# Patient Record
Sex: MALE | Race: WHITE | Employment: OTHER | URBAN - METROPOLITAN AREA
[De-identification: names, ages, dates, MRNs, and addresses within clinical notes are randomized per-mention and may not be internally consistent; named-entity substitution may affect disease eponyms.]

---

## 2017-01-19 ENCOUNTER — OFFICE VISIT (OUTPATIENT)
Dept: FAMILY MEDICINE | Facility: CLINIC | Age: 77
End: 2017-01-19

## 2017-01-19 VITALS
BODY MASS INDEX: 31.79 KG/M2 | SYSTOLIC BLOOD PRESSURE: 165 MMHG | TEMPERATURE: 98.2 F | WEIGHT: 224.8 LBS | DIASTOLIC BLOOD PRESSURE: 96 MMHG | HEART RATE: 68 BPM

## 2017-01-19 DIAGNOSIS — E11.65 TYPE 2 DIABETES MELLITUS WITH HYPERGLYCEMIA, WITH LONG-TERM CURRENT USE OF INSULIN (H): ICD-10-CM

## 2017-01-19 DIAGNOSIS — G25.0 BENIGN ESSENTIAL TREMOR: ICD-10-CM

## 2017-01-19 DIAGNOSIS — N18.30 CHRONIC KIDNEY DISEASE, STAGE III (MODERATE) (H): ICD-10-CM

## 2017-01-19 DIAGNOSIS — Z23 NEED FOR VACCINATION: ICD-10-CM

## 2017-01-19 DIAGNOSIS — R80.9 PROTEINURIA: Primary | ICD-10-CM

## 2017-01-19 DIAGNOSIS — Z79.4 TYPE 2 DIABETES MELLITUS WITH HYPERGLYCEMIA, WITH LONG-TERM CURRENT USE OF INSULIN (H): ICD-10-CM

## 2017-01-19 DIAGNOSIS — E11.22 TYPE 2 DIABETES MELLITUS WITH DIABETIC CHRONIC KIDNEY DISEASE (H): Primary | ICD-10-CM

## 2017-01-19 LAB
BASOPHILS # BLD AUTO: 0 THOU/UL (ref 0–0.2)
BASOPHILS NFR BLD AUTO: 0 % (ref 0–2)
BUN SERPL-MCNC: 31.3 MG/DL (ref 7–21)
CALCIUM SERPL-MCNC: 9.6 MG/DL (ref 8.5–10.1)
CHLORIDE SERPLBLD-SCNC: 106.5 MMOL/L (ref 98–110)
CO2 SERPL-SCNC: 27.1 MMOL/L (ref 20–32)
CREAT SERPL-MCNC: 1.5 MG/DL (ref 0.7–1.3)
CREAT UR-MCNC: 101.9 MG/DL
EOSINOPHIL # BLD AUTO: 0.1 THOU/UL (ref 0–0.4)
EOSINOPHIL NFR BLD AUTO: 2 % (ref 0–6)
ERYTHROCYTE [DISTWIDTH] IN BLOOD BY AUTOMATED COUNT: 12.8 % (ref 11–14.5)
GFR SERPL CREATININE-BSD FRML MDRD: 48.6 ML/MIN/1.7 M2
GLUCOSE SERPL-MCNC: 102 MG'DL (ref 70–99)
HBA1C MFR BLD: 9.9 % (ref 4.1–5.7)
HCT VFR BLD AUTO: 40.6 % (ref 40–54)
HGB BLD-MCNC: 13.3 G/DL (ref 14–18)
LYMPHOCYTES # BLD AUTO: 1.5 THOU/UL (ref 0.8–4.4)
LYMPHOCYTES NFR BLD AUTO: 23 % (ref 20–40)
MCH RBC QN AUTO: 30.6 PG (ref 27–34)
MCHC RBC AUTO-ENTMCNC: 32.8 G/DL (ref 32–36)
MCV RBC AUTO: 93 FL (ref 80–100)
MICROALBUMIN UR-MCNC: 69.37 MG/DL (ref 0–1.99)
MICROALBUMIN/CREAT UR: 680.8 MG/G
MONOCYTES # BLD AUTO: 0.5 THOU/UL (ref 0–0.9)
MONOCYTES NFR BLD AUTO: 7 % (ref 2–10)
NEUTROPHILS # BLD AUTO: 4.5 THOU/UL (ref 2–7.7)
NEUTROPHILS NFR BLD AUTO: 68 % (ref 50–70)
PARATHYROID HORMONE: 43 PG/ML (ref 10–86)
PLATELET # BLD AUTO: 298 THOU/UL (ref 140–440)
PMV BLD AUTO: 10.1 FL (ref 8.5–12.5)
POTASSIUM SERPL-SCNC: 4.6 MMOL/DL (ref 3.2–4.6)
RBC # BLD AUTO: 4.35 MILL/UL (ref 4.4–6.2)
SODIUM SERPL-SCNC: 143 MMOL/L (ref 132–142)
WBC # BLD AUTO: 6.7 THOU/UL (ref 4–11)

## 2017-01-19 RX ORDER — PROPRANOLOL HYDROCHLORIDE 160 MG/1
160 CAPSULE, EXTENDED RELEASE ORAL DAILY
Qty: 90 EACH | Refills: 3 | Status: SHIPPED | OUTPATIENT
Start: 2017-01-19 | End: 2018-04-16

## 2017-01-19 RX ORDER — PROPRANOLOL HYDROCHLORIDE 80 MG/1
80 CAPSULE, EXTENDED RELEASE ORAL AT BEDTIME
Qty: 93 CAPSULE | Refills: 3 | Status: SHIPPED | OUTPATIENT
Start: 2017-01-19 | End: 2018-04-16

## 2017-01-19 RX ORDER — ATORVASTATIN CALCIUM 40 MG/1
40 TABLET, FILM COATED ORAL DAILY
Qty: 90 TABLET | Refills: 3 | Status: SHIPPED | OUTPATIENT
Start: 2017-01-19 | End: 2018-04-16

## 2017-01-19 RX ORDER — LISINOPRIL 40 MG/1
40 TABLET ORAL
Qty: 180 TABLET | Refills: 3 | Status: SHIPPED | OUTPATIENT
Start: 2017-01-19 | End: 2018-04-16

## 2017-01-19 NOTE — MR AVS SNAPSHOT
After Visit Summary   1/19/2017    Senthil Castillo    MRN: [id number]           Patient Information     Date Of Birth          1940        Visit Information        Provider Department      1/19/2017 9:00 AM Mo Lovett MD [facility]        Today's Diagnoses     Proteinuria    -  1     Chronic kidney disease, stage III (moderate)         Need for vaccination         Benign essential tremor         Type 2 diabetes mellitus with hyperglycemia, with long-term current use of insulin (H)           Care Instructions    Thank you for coming to [facility].  **If you had lab testing today and your results are reassuring or normal they will be be mailed to you within 7 days.   **If the lab tests need quick action we will call you with the results.  The phone number we will call with results is # [phone] (home) . If this is not the best number please call our clinic and change the number.  If you need any refills please call your pharmacy and they will contact us.  If you have any concerns about today's visit or wish to schedule another appointment please call our office during normal business hours [phone] (8-5:00 M-F)  If you have urgent medical concerns please call [phone] at any time of the day.  If you a medical emergency please call [phone]  Again thank you for choosing [facility] and please let us know how we can best partner with you to improve you and your family's health.    We discussed your Primdone and decided to leave it in your hands and have you think about going back to your neurologist to discuss other medications.  If and when you decide to do so you can just call the clinic and get a message to Dr. Lovett to put in the referral.   We also refilled all of your medications, so you can  your prescriptions at your pharmacy at your earliest convenience.  You can come back to discuss the back of your legs for another visit.          Follow-ups after your visit         Who to contact     Please call your clinic at [phone] to:    Ask questions about your health    Make or cancel appointments    Discuss your medicines    Learn about your test results    Speak to your doctor   If you have compliments or concerns about an experience at your clinic, or if you wish to file a complaint, please contact [facility] Patient Relations at [phone] or email us at [email or web address]         Additional Information About Your Visit        Benefit Mobile[employer] Information     eLama is an electronic gateway that provides easy, online access to your medical records. With eLama, you can request a clinic appointment, read your test results, renew a prescription or communicate with your care team.     To sign up for eLama visit the website at [email or web address]   You will be asked to enter the access code listed below, as well as some personal information. Please follow the directions to create your username and password.     Your access code is: [email or web address]  Expires: 2017  9:50 AM     Your access code will  in 90 days. If you need help or a new code, please contact your [facility] or call [phone] for assistance.        Care EveryWhere ID     This is your Care EveryWhere ID. This could be used by other organizations to access your [place] medical records  [id number]        Your Vitals Were     Pulse Temperature                68 98.2  F (36.8  C) (Oral)           Blood Pressure from Last 3 Encounters:   17 165/96   16 174/82   02/12/15 156/92    Weight from Last 3 Encounters:   17 224 lb 12.8 oz (101.969 kg)   16 228 lb 9.6 oz (103.692 kg)   02/12/15 228 lb 11.2 oz (103.738 kg)              We Performed the Following     ADMIN VACCINE, EACH ADDITIONAL     ADMIN VACCINE, INITIAL     Basic Metabolic Panel ([place])     CBC w/ Diff and Plt ([facility])     Flu vaccine, quad, with  preservative, 0.5 ml     Hemoglobin A1c ([facility])     Microalbumin Creatinine Ratio Random Ur ([facility])     Parathyroid Horm.Intact ([facility])     PNEUMOCOCCAL CONJ VACCINE 13 VALENT IM (PCV13)        Primary Care Provider Office Phone # Fax #    Mo Lovett [phone]       [address] [address]        Thank you!     Thank you for choosing [facility]  for your care. Our goal is always to provide you with excellent care. Hearing back from our patients is one way we can continue to improve our services. Please take a few minutes to complete the written survey that you may receive in the mail after your visit with us. Thank you!             Your Updated Medication List - Protect others around you: Learn how to safely use, store and throw away your medicines at www.disposemymeds.org.          This list is accurate as of: 1/19/17  9:50 AM.  Always use your most recent med list.                   Brand Name Dispense Instructions for use    aspirin 325 MG tablet      Take  by mouth daily.       atorvastatin 40 MG tablet    LIPITOR    90 tablet    Take 1 tablet (40 mg) by mouth daily       blood glucose monitoring test strip    ONE TOUCH ULTRA    1 Box    Use up to 3 times daily       insulin pen needle 29G X 12.7MM    BD ULTRA-FINE    180 each    Use twice daily       lisinopril 40 MG tablet    PRINIVIL/ZESTRIL    180 tablet    Take 1 tablet (40 mg) by mouth 2 times daily       metFORMIN 1000 MG tablet    GLUCOPHAGE    180 tablet    Take 1 tablet (1,000 mg) by mouth 2 times daily (with meals)       NovoLIN MIX 70/30 VIAL injection   Generic drug:  insulin NPH-insulin regular      Take 20 u morning and 30 u evening       primidone 50 MG tablet    MYSOLINE    62 tablet    Take 1/2 tablet (25 mg) at bedtime for 1 week, 1 tablet at bedtime for 1 week, 1 and 1/2 tablet at bedtime for 1 week, then 2 tablets at bedtime       * propranolol 160 MG 24 hr capsule     INDERAL LA    90 each    Take 1 capsule (160 mg) by mouth daily       * propranolol 80 MG 24 hr capsule    INDERAL LA    90 capsule    Take 1 capsule (80 mg) by mouth At Bedtime       * Notice:  This list has 2 medication(s) that are the same as other medications prescribed for you. Read the directions carefully, and ask your doctor or other care provider to review them with you.

## 2017-01-19 NOTE — PROGRESS NOTES
"Subjective:  Senthil Castillo is a 76 year old male with a past medical history of colonic polyps, CKD Stage III, coronary atherosclerosis, HTN, essential tremor, DMII presenting today for a diabetic check-up. His most recent visit was 4/2016 and he knows he should have come in before now.  At that time he endorsed experiencing hypoglycemic episodes approx. 1-2 times a year while working hard. Today he is taking his medications as prescribed and has not experienced any hypoglycemic episodes recently.       Concerns today include:     1. Hypertension: In office measures 165/96. He states at home and in store automated readings of 130s systolic and reports that he has always had \"white coat high blood pressure.\"     2. Diabetes Mellitus Type II: He states that he tries to keep his BG in the 150s although it can be difficult. A1C today is 9.9 and is typically around 9. Denies any recent hypoglycemic events. Current insulin regiment is purchased OTC and he is unwilling to change due to cost and difficulty making change. Denies any decreased sensation or visual symptoms and will be following up with optho soon for his annual visit.     3.Tremor: worse at prior visit and recommended adding primidone with his propanolol. Today he reports that he had noted nightmares with primidone at night and felt \"funny\" if he takes it in the morning. He discontinued the primidone 5 days ago 2/2 these side effects. He is unable to write his name due to the severity of the tremor. Today he wonders about trying a new med. He doesn't want to schedule an appointment with neurology at this point.     4. Personal Stress: Has previously endorsed additional stress attributed to wife's changing memory and personality. She is doing better and is now following with a PCP. He is currently doing well with his mood and stress.    FamHx: Maternal Breast Ca, intestinal Ca. No diabetes. Possible HTN. Dad had skin cancer.  Alcohol: A beer every once in a " while  Tobacco: None. Chewed cigars in the past.  Recreational Drugs: None.   Hobbies/Goals: Rebuilds cars.   Occupation: Retired . previously endorsed additional stress attributed to wife's changing memory and personality. She is doing better and is now following with a PCP. He is currently doing well with his mood and stress.    Review of Systems:  He denies chest pain, shortness of breath, vision changes, stomach ache, nausea, vomiting, dizziness, diarrhea/constipation, muscle/joint pain, fever, night sweats, weight loss, skin changes, mood changes, denies tingling/numbness in fingers/toes.    Objective:  /96 mmHg  Pulse 68  Temp(Src) 98.2  F (36.8  C) (Oral)  Wt 224 lb 12.8 oz (101.969 kg)    Physical Exam   Constitutional: He is oriented to person, place, and time and well-developed, well-nourished, and in no distress. No distress.   HENT:   Head: Atraumatic.   Eyes: EOM are normal. Pupils are equal, round, and reactive to light. No scleral icterus.   Neck: No JVD present. No tracheal deviation present.   Cardiovascular: Normal rate, regular rhythm, normal heart sounds and intact distal pulses.  Exam reveals no gallop and no friction rub.    No murmur heard.  Pulmonary/Chest: Effort normal. No respiratory distress. He has no wheezes. He has no rales.   Abdominal: Soft. He exhibits no distension.   Musculoskeletal: He exhibits no edema or tenderness.   Neurological: He is alert and oriented to person, place, and time.   Pedal sensation intact to monofilament examination.    Skin: Skin is warm and dry. No rash noted. He is not diaphoretic. No erythema.   Some dry skin on foot examination.    Psychiatric: Memory, affect and judgment normal.       Assessment/Plan:  Senthil Castillo is a 75 year old male presenting today for Diabetes Mellitus check-up.     1. Type II diabetes mellitus: A1c 9.9. Discussed medication changes. Pt. Is currently unwilling to change both due to cost but also because he does  not want to change. No neurological deficits noted on exam. Discussed dangers of uncontrolled diabetes and need to reassess personal management of DM. Encouraged to return more frequently to the office.   - Hemoglobin A1c ([facility])  - Microalbumin Creatinine Ratio Random Ur ([facility])  - atorvastatin continued  - metFORMIN continued  - insulin pen needle continude    2. Essential hypertension secondary to CKD: Elevated BP today but  Pt. States home/store readings have been in the systolic 130s.  We therefore did not change BP meds today but ask him to notify us if BP remains >140/90  - Basic Metabolic Panel ([facility]) - Results < 1 hr    3. Proteinuria  - Basic Metabolic Panel ([facility]) - Results < 1 hr  - lisinopril continued    4. Coronary atherosclerosis of unspecified type of vessel, native or graft: No symptoms at this time. Will continue to monitor for them.   - continue Statin, aspirin    5. Chronic kidney disease, stage III (moderate): creatinine 1.5, BUN 31.3, GFR 48.6. Will continue to monitor. GFR estimate is 48.6 today, if GFR drops below 45 the benefits of continuing metformin should be discussed with patient. Metformin should be discontinued if GFR drops below 30 due to risk of lactic acidosis.  He is not attending nephrology regularly.  Agreed that if renal function declines or if other labs return significantly abnormal will refer back to nephrology.    - Microalbumin Creatinine Ratio Random Ur ([facility])  - Parathyroid Horm.Intact ([facility])  - CBC with Plt ([facility])  - lisinopril continued    6. Essential and other specified forms of tremor: Discontinued primidone 5 days ago 2/2 nightmares. Because of tremor he is unable to write name. Will continue to follow. Does not want neurology appointment at this time even though symptoms appear to be worsened.    - propranolol continued    7. Maintenance: PCV 13 and flu shot administered    Total visit time was 40 mins, all of this was face to face MD time  and over 50% spent in counselling and coordination of care.     This note is scribed by Anton Velazquez, MS4 scribed for Mo Lovett MD MPH    The medical student acted as a scribe and the encounter documented above was performed completely by me.    Mo Lovett MD MPH

## 2017-01-19 NOTE — PATIENT INSTRUCTIONS
Thank you for coming to [facility].  **If you had lab testing today and your results are reassuring or normal they will be be mailed to you within 7 days.   **If the lab tests need quick action we will call you with the results.  The phone number we will call with results is # [phone] (home) . If this is not the best number please call our clinic and change the number.  If you need any refills please call your pharmacy and they will contact us.  If you have any concerns about today's visit or wish to schedule another appointment please call our office during normal business hours [phone] (8-5:00 M-F)  If you have urgent medical concerns please call [phone] at any time of the day.  If you a medical emergency please call [phone]  Again thank you for choosing [facility] and please let us know how we can best partner with you to improve you and your family's health.    We discussed your Primdone and decided to leave it in your hands and have you think about going back to your neurologist to discuss other medications.  If and when you decide to do so you can just call the clinic and get a message to Dr. Lovett to put in the referral.   We also refilled all of your medications, so you can  your prescriptions at your pharmacy at your earliest convenience.  You can come back to discuss the back of your legs for another visit.

## 2017-01-19 NOTE — Clinical Note
January 24, 2017      Senthil Castillo  [address]  [place] [address]        Dear Senthil,    Please see below for your test results.    Resulted Orders   Hemoglobin A1c ([facility])   Result Value Ref Range    Hemoglobin A1C 9.9 (H) 4.1 - 5.7 %   Microalbumin Creatinine Ratio Random Ur ([facility])   Result Value Ref Range    Microalbumin, Urine 69.37 (H) 0.00 - 1.99 mg/dL    Creatinine, Urine 101.9 mg/dL    Albumin Urine mg/g Cr 680.8 (H) <=19.9 mg/g    Narrative    Test performed by:  [facility]  [address] [place] [address]  Microalbumin, Random Urine  <2.0 mg/dL . . . . . . . . Normal  3.0-30.0 mg/dL . . . . . . Microalbuminuria  >30.0 mg/dL . . . . . .  . Clinical Proteinuria  Microalbumin/Creatinine Ratio, Random Urine  <20 mg/g . . . . .. . . . Normal   mg/g . . . . . . . Microalbuminuria  >300 mg/g . . . . . . . . Clinical Proteinuria   CBC w/ Diff and Plt ([facility])   Result Value Ref Range    WBC 6.7 4.0 - 11.0 thou/uL    RBC 4.35 (L) 4.40 - 6.20 mill/uL    Hemoglobin 13.3 (L) 14.0 - 18.0 g/dL    Hematocrit 40.6 40.0 - 54.0 %    MCV 93 80 - 100 fL    MCH 30.6 27.0 - 34.0 pg    MCHC 32.8 32.0 - 36.0 g/dL    RDW 12.8 11.0 - 14.5 %    Platelets 298 140 - 440 thou/uL    Mean Platelet Volume 10.1 8.5 - 12.5 fL    % Neutrophils 68 50 - 70 %    % Lymphocytes 23 20 - 40 %    % Monocytes 7 2 - 10 %    % Eosinophils 2 0 - 6 %    % Basophils 0 0 - 2 %    Neutrophils (Absolute) 4.5 2.0 - 7.7 thou/uL    Lymphs (Absolute) 1.5 0.8 - 4.4 thou/uL    Monocytes(Absolute) 0.5 0.0 - 0.9 thou/uL    Eos (Absolute) 0.1 0.0 - 0.4 thou/uL    Baso (Absolute) 0.0 0.0 - 0.2 thou/uL    Narrative    Test performed by:  [facility] [address] [place] [address]   Basic Metabolic Panel ([place])   Result Value Ref Range    Urea Nitrogen 31.3 (H) 7.0 - 21.0 mg/dL    Calcium 9.6 8.5 - 10.1 mg/dL    Chloride 106.5 98.0 - 110.0 mmol/L    Carbon Dioxide 27.1 20.0 - 32.0 mmol/L    Creatinine  1.5 (H) 0.7 - 1.3 mg/dL    Glucose 102.0 (H) 70.0 - 99.0 mg'dL    Potassium 4.6 3.2 - 4.6 mmol/dL    Sodium 143.0 (H) 132.0 - 142.0 mmol/L    GFR Estimate 48.6 mL/min/1.7 m2    GFR Estimate If Black 58.8 mL/min/1.7 m2   Parathyroid Horm.Intact ([facility])   Result Value Ref Range    Parathyroid Hormone 43 10 - 86 pg/mL    Narrative    Test performed by:  [facility]  [address] [place] [address]     Jose Guadalupe Ndiaye - nice to see you.  As we discussed, the average sugar could be lower.  But, otherwise, your kidney function remains in stable range.  Your hemoglobin (remember the assistant got this confused with the hemoglobin A1C) is actually just below normal and your parathyroid hormone is normal so you don't have to see the kidney doctor nor need iron and EPO at this time.  You are still losing protein in your urine - and this is related to your blood sugars.  OK, take care Senthil - and let's plan to see you in 3-6 months - Mo Lovett

## 2017-04-24 ENCOUNTER — OFFICE VISIT (OUTPATIENT)
Dept: FAMILY MEDICINE | Facility: CLINIC | Age: 77
End: 2017-04-24

## 2017-04-24 VITALS
WEIGHT: 224.6 LBS | DIASTOLIC BLOOD PRESSURE: 89 MMHG | OXYGEN SATURATION: 95 % | TEMPERATURE: 98 F | BODY MASS INDEX: 31.77 KG/M2 | HEART RATE: 71 BPM | SYSTOLIC BLOOD PRESSURE: 166 MMHG

## 2017-04-24 DIAGNOSIS — Z79.4 TYPE 2 DIABETES MELLITUS WITH DIABETIC NEPHROPATHY, WITH LONG-TERM CURRENT USE OF INSULIN (H): Primary | ICD-10-CM

## 2017-04-24 DIAGNOSIS — E11.21 TYPE 2 DIABETES MELLITUS WITH DIABETIC NEPHROPATHY, WITH LONG-TERM CURRENT USE OF INSULIN (H): Primary | ICD-10-CM

## 2017-04-24 DIAGNOSIS — N18.30 CHRONIC KIDNEY DISEASE, STAGE III (MODERATE) (H): ICD-10-CM

## 2017-04-24 DIAGNOSIS — R80.9 PROTEINURIA: ICD-10-CM

## 2017-04-24 LAB
% GRANULOCYTES: 74.2 %G (ref 40–75)
BUN SERPL-MCNC: 31.8 MG/DL (ref 7–21)
CALCIUM SERPL-MCNC: 9.7 MG/DL (ref 8.5–10.1)
CHLORIDE SERPLBLD-SCNC: 103.1 MMOL/L (ref 98–110)
CO2 SERPL-SCNC: 28.4 MMOL/L (ref 20–32)
CREAT SERPL-MCNC: 1.3 MG/DL (ref 0.7–1.3)
CREAT UR-MCNC: 131.9 MG/DL
GFR SERPL CREATININE-BSD FRML MDRD: 57.4 ML/MIN/1.7 M2
GLUCOSE SERPL-MCNC: 151.2 MG'DL (ref 70–99)
GRANULOCYTES #: 4.9 K/UL (ref 1.6–8.3)
HBA1C MFR BLD: 8.1 % (ref 4.1–5.7)
HCT VFR BLD AUTO: 40.5 % (ref 40–53)
HEMOGLOBIN: 12 G/DL (ref 13.3–17.7)
LYMPHOCYTES # BLD AUTO: 1.3 K/UL (ref 0.8–5.3)
LYMPHOCYTES NFR BLD AUTO: 19.8 %L (ref 20–48)
MCH RBC QN AUTO: 28.1 PG (ref 26.5–35)
MCHC RBC AUTO-ENTMCNC: 29.6 G/DL (ref 32–36)
MCV RBC AUTO: 94.9 FL (ref 78–100)
MICROALBUMIN UR-MCNC: 148.1 MG/DL (ref 0–1.99)
MICROALBUMIN/CREAT UR: 1122.8 MG/G
MID #: 0.4 K/UL (ref 0–2.2)
MID %: 6 %M (ref 0–20)
PLATELET # BLD AUTO: 288 K/UL (ref 150–450)
POTASSIUM SERPL-SCNC: 4.7 MMOL/DL (ref 3.2–4.6)
RBC # BLD AUTO: 4.3 M/UL (ref 4.4–5.9)
SODIUM SERPL-SCNC: 137.7 MMOL/L (ref 132–142)
T4 FREE SERPL-MCNC: 0.9 NG/DL (ref 0.7–1.8)
TSH SERPL DL<=0.05 MIU/L-ACNC: 6.46 UIU/ML (ref 0.3–5)
WBC # BLD AUTO: 6.6 K/UL (ref 4–11)

## 2017-04-24 NOTE — LETTER
April 25, 2017      Senthil Castillo  [address]  [place] [address]        Dear Senthil,    Please see below for your test results.    Resulted Orders   Basic Metabolic Panel ([place])   Result Value Ref Range    Urea Nitrogen 31.8 (H) 7.0 - 21.0 mg/dL    Calcium 9.7 8.5 - 10.1 mg/dL    Chloride 103.1 98.0 - 110.0 mmol/L    Carbon Dioxide 28.4 20.0 - 32.0 mmol/L    Creatinine 1.3 0.7 - 1.3 mg/dL    Glucose 151.2 (H) 70.0 - 99.0 mg'dL    Potassium 4.7 (H) 3.2 - 4.6 mmol/dL    Sodium 137.7 132.0 - 142.0 mmol/L    GFR Estimate 57.4 (L) >60.0 mL/min/1.7 m2    GFR Estimate If Black 69.4 >60.0 mL/min/1.7 m2   Hemoglobin A1c ([facility])   Result Value Ref Range    Hemoglobin A1C 8.1 (H) 4.1 - 5.7 %   Microalbumin Creatinine Ratio Random Ur ([facility])   Result Value Ref Range    Microalbumin, Urine 148.10 (H) 0.00 - 1.99 mg/dL    Creatinine, Urine 131.9 mg/dL    Albumin Urine mg/g Cr 1122.8 (H) <=19.9 mg/g    Narrative    Test performed by:  [facility]  [address] [place] [address]  Microalbumin, Random Urine  <2.0 mg/dL . . . . . . . . Normal  3.0-30.0 mg/dL . . . . . . Microalbuminuria  >30.0 mg/dL . . . . . .  . Clinical Proteinuria  Microalbumin/Creatinine Ratio, Random Urine  <20 mg/g . . . . .. . . . Normal   mg/g . . . . . . . Microalbuminuria  >300 mg/g . . . . . . . . Clinical Proteinuria   Thyroid [place] ([facility])   Result Value Ref Range    TSH 6.46 (H) 0.30 - 5.00 uIU/mL    Narrative    Test performed by:  [facility]  [address] [place] [address]   CBC with Diff Plt ([facility])   Result Value Ref Range    WBC 6.6 4.0 - 11.0 K/uL    Lymphocytes # 1.3 0.8 - 5.3 K/uL    % Lymphocytes 19.8 (L) 20.0 - 48.0 %L    Mid # 0.4 0.0 - 2.2 K/uL    Mid % 6.0 0.0 - 20.0 %M    GRANULOCYTES # 4.9 1.6 - 8.3 K/uL    % Granulocytes 74.2 40.0 - 75.0 %G    RBC 4.3 (L) 4.4 - 5.9 M/uL    Hemoglobin 12.0 (L) 13.3 - 17.7 g/dL    Hematocrit 40.5 40.0 - 53.0 %    MCV 94.9 78.0 - 100.0  fL    MCH 28.1 26.5 - 35.0    MCHC 29.6 (L) 32.0 - 36.0 g/dL    Platelets 288.0 150.0 - 450.0 K/uL   T4  Free ([facility])   Result Value Ref Range    T4 Free 0.9 0.7 - 1.8 ng/dL    Narrative    Test performed by:  [facility]  [address] [place] [address]       James Shin - a few things with your labs:    1. Firstly, your A1C (average sugar) is MUCH better - congratulations!  It is the lowest that it has been in 4 years - 8.1.  Our goal is 8.0 so you're right at goal.  This is telling us that on average you are sitting right around 150-160 between the highs and lows.  Keep up the good work!  As you know I do not think you are on the best insulin regimen for you but you obviously have made this work - and made major dietary changes.  2. Your thyroid level is borderline.  This is a new finding and could explain tiredness.  I suggest we recheck this in 3 months.  It's now abnormal enough to take a medication for it yet.    3. Your kidney function has improved as shown by a reduction in your blood creatinine (waste product) from 1.5 three months ago to 1.3.  This is good!  Your hemoglobin is steady - so you do not need to see the kidney doctor for any iron or EPO.  However, somewhat surprisingly you are losing more protein in your urine than you did previously - usually this tracks your diabetes control.  Again, I would suggest we recheck all these labs in 3 months - OK?      4. Your potassium was just mildly elevated - don't take any extra potassium and back off on bananas for now - OK?  Again, we'll recheck this - it can be a side effect of Lisinopril - but don't change any of your medications for now - OK?    I hope this is clear - feel free to call our nurses if you want to discuss anything I am saying here further.  All the best!  Mo Lovett MD

## 2017-04-24 NOTE — MR AVS SNAPSHOT
After Visit Summary   4/24/2017    Senthil Castillo    MRN: [id number]           Patient Information     Date Of Birth          1940        Visit Information        Provider Department      4/24/2017 8:40 AM Mo Lovett MD [facility]        Today's Diagnoses     Type 2 diabetes mellitus with diabetic nephropathy, with long-term current use of insulin (H)    -  1    Chronic kidney disease, stage III (moderate)        Proteinuria          Care Instructions    For your back pain: Please come back to clinic if the pain worsens or changes.    For your diabetes: Great job with your diet changes and weight loss. Keep the great work. We'll write you with the results of your blood and urine test, which will tell us how your blood sugars and kidneys are doing. We'll see you again in 3 months (end of July) for your recheck.     For your skin: Please make an appointment with Dr. Lovett or a dermatologist so we can focus on your skin. In the meantime, make sure to moisturize for the dry skin on your hands (Eucerin or Aquafor), and wear sunscreen, long-sleeves, and hats for the summer.           Follow-ups after your visit        Who to contact     Please call your clinic at [phone] to:    Ask questions about your health    Make or cancel appointments    Discuss your medicines    Learn about your test results    Speak to your doctor   If you have compliments or concerns about an experience at your clinic, or if you wish to file a complaint, please contact [facility] Patient Relations at [phone] or email us at [email or web address]         Additional Information About Your Visit        MyChart Information     [email or web address] is an electronic gateway that provides easy, online access to your medical records. With [email or web address], you can request a clinic appointment, read your test results, renew a prescription or communicate with your care team.     To sign up for [facility] visit  the website at [email or web address]   You will be asked to enter the access code listed below, as well as some personal information. Please follow the directions to create your username and password.     Your access code is: [email or web address]  Expires: 2017  9:44 AM     Your access code will  in 90 days. If you need help or a new code, please contact your [facility] or call [phone] for assistance.        Care EveryWhere ID     This is your Care EveryWhere ID. This could be used by other organizations to access your [place] medical records  [id number]        Your Vitals Were     Pulse Temperature Pulse Oximetry BMI (Body Mass Index)          71 98  F (36.7  C) 95% 31.77 kg/m2         Blood Pressure from Last 3 Encounters:   17 166/89   17 (!) 165/96   16 174/82    Weight from Last 3 Encounters:   17 224 lb 9.6 oz (101.9 kg)   17 224 lb 12.8 oz (102 kg)   16 228 lb 9.6 oz (103.7 kg)              We Performed the Following     Basic Metabolic Panel ([place])     CBC with Diff Plt ([facility])     Hemoglobin A1c ([facility])     Microalbumin Creatinine Ratio Random Ur ([facility])     Thyroid [place] ([facility])        Primary Care Provider Office Phone # Fax #    Mo Lovett [phone]       [address] [address]        Thank you!     Thank you for choosing [facility]  for your care. Our goal is always to provide you with excellent care. Hearing back from our patients is one way we can continue to improve our services. Please take a few minutes to complete the written survey that you may receive in the mail after your visit with us. Thank you!             Your Updated Medication List - Protect others around you: Learn how to safely use, store and throw away your medicines at www.disposemymeds.org.          This list is accurate as of: 17  9:44 AM.  Always use your most recent  med list.                   Brand Name Dispense Instructions for use    aspirin 81 MG tablet     93 tablet    Take 1 tablet (81 mg) by mouth daily       atorvastatin 40 MG tablet    LIPITOR    90 tablet    Take 1 tablet (40 mg) by mouth daily       blood glucose monitoring test strip    ONE TOUCH ULTRA    1 Box    Use up to 3 times daily       insulin pen needle 29G X 12.7MM    BD ULTRA-FINE    180 each    Use twice daily       lisinopril 40 MG tablet    PRINIVIL/ZESTRIL    180 tablet    Take 1 tablet (40 mg) by mouth 2 times daily       metFORMIN 1000 MG tablet    GLUCOPHAGE    180 tablet    Take 1 tablet (1,000 mg) by mouth 2 times daily (with meals)       NovoLIN MIX 70/30 VIAL injection   Generic drug:  insulin NPH-insulin regular      Take 20 u morning and 30 u evening       primidone 50 MG tablet    MYSOLINE    62 tablet    Take 1/2 tablet (25 mg) at bedtime for 1 week, 1 tablet at bedtime for 1 week, 1 and 1/2 tablet at bedtime for 1 week, then 2 tablets at bedtime       * propranolol 160 MG 24 hr capsule    INDERAL LA    90 each    Take 1 capsule (160 mg) by mouth daily       * propranolol 80 MG 24 hr capsule    INDERAL LA    93 capsule    Take 1 capsule (80 mg) by mouth At Bedtime       * Notice:  This list has 2 medication(s) that are the same as other medications prescribed for you. Read the directions carefully, and ask your doctor or other care provider to review them with you.

## 2017-04-24 NOTE — PATIENT INSTRUCTIONS
For your back pain: Please come back to clinic if the pain worsens or changes.    For your diabetes: Great job with your diet changes and weight loss. Keep the great work. We'll write you with the results of your blood and urine test, which will tell us how your blood sugars and kidneys are doing. We'll see you again in 3 months (end of July) for your recheck.     For your skin: Please make an appointment with Dr. Lovett or a dermatologist so we can focus on your skin. In the meantime, make sure to moisturize for the dry skin on your hands (Eucerin or Aquafor), and wear sunscreen, long-sleeves, and hats for the summer.

## 2017-04-24 NOTE — PROGRESS NOTES
"       SUBJECTIVE       Senthil Castillo is a 76 year old  male with a PMH significant for:     Patient Active Problem List   Diagnosis     Chronic kidney disease, stage III (moderate)     Coronary atherosclerosis     Pure hypercholesterolemia     Essential hypertension     Proteinuria     Calculus of kidney     Inguinal hernia     Premature beats     History of colonic polyps     Type 2 diabetes mellitus with diabetic chronic kidney disease (H)     Benign essential tremor     He presents with 3-month history of leg pain and for a DM recheck.     1. Leg pain: Mr. Castillo noticed the a \"stiffness\" in the back of both his legs in January. The pain is in his calves and the back of his thighs. It is usually worse with activity of any kind, or standing up in the shower. However it is not exclusively exertional and it also occurs at night when he tries to shift in bed. He has also noticed lower back pain as well, although this is more intermittent. The back pain seems to improve when he bends forward while walking, and with rest. He has tried several OTC pain relief meds (Aleve, Ibuprofen, Advil), but none has helped. He denies any loss of sensation, tingling, numbness, swelling of his feet, or shortness of breath. He has also noticed more fatigue since January.      2. DM Recheck: Mr. Castillo was concerned about his last A1c of 9.9 (01/19/17). He has made significant changes to his diet and routine since then, and is hopeful that his sugars are better controlled. He is still using insulin as before (25 units in am; approx 30 units at pm), although he does not eat meals in the morning. There have been multiple prior conversations about the inadvisability of using regular insulin and not eating.      He takes the rest of his medications as prescribed for blood pressure, cholesterol, and benign essential tremor. However, he takes 325 mg ASA instead of 81 mg.     PMH, Medications and Allergies were reviewed and updated as needed.     "    REVIEW OF SYSTEMS     CONSTITUTIONAL: NEGATIVE for fever, chills. Intentional weight loss in past three months. Fatigue since January, unable to pursue full range of hobbies and activities as before.   INTEGUMENTARY/SKIN: Dry skin on hands. Dry patch on ear, some moles on his upper arms, although none that have changed significantly recently.   EYES: NEGATIVE for vision changes or irritation  RESP: NEGATIVE for significant cough or SOB  CV: NEGATIVE for chest pain, palpitations or peripheral edema  GI: NEGATIVE for nausea, abdominal pain, heartburn, or change in bowel habits  MUSCULOSKELETAL: Bilateral pain in legs as described above. Lower back pain as described above. No other significant arthralgias or myalgia.   NEURO: NEGATIVE for weakness, dizziness or paresthesias  ENDOCRINE: NEGATIVE for temperature intolerance, skin/hair changes  PSYCHIATRIC: NEGATIVE for changes in mood or affect        OBJECTIVE     Vitals:    04/24/17 0850   BP: 166/89   Pulse: 71   Temp: 98  F (36.7  C)   SpO2: 95%   Weight: 224 lb 9.6 oz (101.9 kg)     Body mass index is 31.77 kg/(m^2).    Constitutional: Awake, alert, cooperative, no apparent distress, and appears stated age  VS  Noted - BP elevated but he believes this is white coat related.  Back: Symmetric, no curvature, spinous processes are non-tender on palpation, paraspinous muscles are non-tender on palpation, no costal vertebral tenderness.   Cardiovascular: Pedal pulses intact bilaterally, no swelling.  No hair on lower extremities.   Musculoskeletal: Positive straight leg raise test bilaterally, approximately 70 degrees with tightness in hamstrings.  No redness, warmth, or swelling of the joints.  Full range of motion noted hips and knees. Motor strength is 5 out of 5 in lower extremities bilaterally.   Neurologic: Awake, alert, oriented to name, place and time.  Neuropsychiatric: Normal affect, mood, orientation, memory and insight.  Resting tremor right hand, no  slowness observed with movement, walking; no hypertonicity nor rigidity appreciated to suggest parkinsons.  Skin: Dry skin bilaterally upper extremities. Moles interspersed on back of forearms bilaterally with SKs throughout.  Probable AK on right ear pinna.     Results for orders placed or performed in visit on 04/24/17   Basic Metabolic Panel ([place])   Result Value Ref Range    Urea Nitrogen 31.8 (H) 7.0 - 21.0 mg/dL    Calcium 9.7 8.5 - 10.1 mg/dL    Chloride 103.1 98.0 - 110.0 mmol/L    Carbon Dioxide 28.4 20.0 - 32.0 mmol/L    Creatinine 1.3 0.7 - 1.3 mg/dL    Glucose 151.2 (H) 70.0 - 99.0 mg'dL    Potassium 4.7 (H) 3.2 - 4.6 mmol/dL    Sodium 137.7 132.0 - 142.0 mmol/L    GFR Estimate 57.4 (L) >60.0 mL/min/1.7 m2    GFR Estimate If Black 69.4 >60.0 mL/min/1.7 m2   Hemoglobin A1c ([facility])   Result Value Ref Range    Hemoglobin A1C 8.1 (H) 4.1 - 5.7 %   Microalbumin Creatinine Ratio Random Ur ([facility])   Result Value Ref Range    Microalbumin, Urine 148.10 (H) 0.00 - 1.99 mg/dL    Creatinine, Urine 131.9 mg/dL    Albumin Urine mg/g Cr 1122.8 (H) <=19.9 mg/g    Narrative    Test performed by:  [facility]  [address] [place] [address]  Microalbumin, Random Urine  <2.0 mg/dL . . . . . . . . Normal  3.0-30.0 mg/dL . . . . . . Microalbuminuria  >30.0 mg/dL . . . . . .  . Clinical Proteinuria  Microalbumin/Creatinine Ratio, Random Urine  <20 mg/g . . . . .. . . . Normal   mg/g . . . . . . . Microalbuminuria  >300 mg/g . . . . . . . . Clinical Proteinuria   Thyroid [place] ([facility])   Result Value Ref Range    TSH 6.46 (H) 0.30 - 5.00 uIU/mL    Narrative    Test performed by:  [facility] [address] [place] [address]   CBC with Diff Plt ([facility])   Result Value Ref Range    WBC 6.6 4.0 - 11.0 K/uL    Lymphocytes # 1.3 0.8 - 5.3 K/uL    % Lymphocytes 19.8 (L) 20.0 - 48.0 %L    Mid # 0.4 0.0 - 2.2 K/uL    Mid % 6.0 0.0 - 20.0 %M    GRANULOCYTES # 4.9 1.6 - 8.3  K/uL    % Granulocytes 74.2 40.0 - 75.0 %G    RBC 4.3 (L) 4.4 - 5.9 M/uL    Hemoglobin 12.0 (L) 13.3 - 17.7 g/dL    Hematocrit 40.5 40.0 - 53.0 %    MCV 94.9 78.0 - 100.0 fL    MCH 28.1 26.5 - 35.0    MCHC 29.6 (L) 32.0 - 36.0 g/dL    Platelets 288.0 150.0 - 450.0 K/uL   T4  Free ([facility])   Result Value Ref Range    T4 Free 0.9 0.7 - 1.8 ng/dL    Narrative    Test performed by:  [facility]  [address] [place] [address]           ASSESSMENT AND PLAN     Mr. Castillo is a 76-year old male with a PMH of CKD (stage III) and DM here with a 3-month history of bilateral leg pain and for DM recheck.    1. Low back and bilat leg pain: The bilateral leg stiffness associated with LBP relieved by forward flexion is most likely due to spinal stenosis. He is concerned this may be related to his DM, but we discussed that this is unlikely since he does not experience the more typical symptoms associated with neuropathy. No evidence of peripheral arterial disease apart from lack of hair on LE . We discussed obtaining an MRI which would help confirm the cause of his leg pain, but Mr. Castillo said he would wait until next DM recheck (3 months, end of July). If the pain is worse, he will consider next options (pain management, MRI, and associated follow-up). Advised him to RTC if pain worsens significantly.     2. Type 2 diabetes mellitus with diabetic nephropathy, with long-term current use of insulin (H)  Mr. Castillo was concerned given his last A1c reading, and is dedicated to maintaining his dietary and lifestyle changes to maintain a lower A1c. He continues to take insulin as before (25 mg am, 30 mg at night; skips meals in the am.) This is not an ideal regimen given his lifestyle (long periods of fasting) but he does not want to alter his insulin regimen. He agreed to revisit in 3 months. We will check his A1c, albumin:creat ratio, BMP and write with results.  - Basic Metabolic Panel ([place])  - Hemoglobin A1c  ([facility])  - Microalbumin Creatinine Ratio Random Ur ([facility])  - Thyroid [place] ([facility])    3. Chronic kidney disease, stage III (moderate)  Given his increased fatigue, Mr. Castillo agreed to checking his thyroid and CBC. We will consider recommending follow-up with a nephrologist if his kidney function has decreased since last visit.   - CBC with Diff Plt ([facility])    4. Skin check  AK on ear - recommend dedicated OV for skin check either with me or dermatologist.    5. Tremor  Probable BE tremor, no sign obvious parkinsons - follow up with Neurology PRN.    RTC in 3 months for follow up of DM. RTC sooner if back pain changes in severity.    Total visit time was 25 mins, all of which was face to face MD time, and over 50% of this time was spent in counseling and coordination of care.    The medical student acted as a scribe and the encounter documented above was performed completely by me.    Mo Lovett MD MPH      I, Madelyn Horton am acting as scribe for Dr. Mo Lovett MD.

## 2017-04-24 NOTE — Clinical Note
Good note Madelyn!  I did make a number of edits - hard to point them all out.  Read over it.  For example, I included more of a diff dx of leg pain and added pain was not exclusively exertional.  Included some neuro exam findings.  Added rec skin exam to plan.  And - again - that he declines to change insulin regimen.  Labs looked good!  thanks

## 2017-04-27 ENCOUNTER — TELEPHONE (OUTPATIENT)
Dept: FAMILY MEDICINE | Facility: CLINIC | Age: 77
End: 2017-04-27

## 2017-04-27 NOTE — TELEPHONE ENCOUNTER
[facility] phone call message- patient requesting results:    Test: Lab    Date of test: 5/24/2017    Additional Comments: the pt would like to know the results of their A1C    OK to leave a message on voice mail? Yes    Primary language: English      needed? No    Call taken on April 27, 2017 at 3:01 PM by Mo Jo

## 2017-08-10 ENCOUNTER — OFFICE VISIT (OUTPATIENT)
Dept: FAMILY MEDICINE | Facility: CLINIC | Age: 77
End: 2017-08-10

## 2017-08-10 VITALS
SYSTOLIC BLOOD PRESSURE: 137 MMHG | OXYGEN SATURATION: 97 % | WEIGHT: 219.6 LBS | DIASTOLIC BLOOD PRESSURE: 83 MMHG | TEMPERATURE: 98.4 F | HEART RATE: 66 BPM | BODY MASS INDEX: 31.06 KG/M2

## 2017-08-10 DIAGNOSIS — R53.82 CHRONIC FATIGUE: ICD-10-CM

## 2017-08-10 DIAGNOSIS — I10 ESSENTIAL HYPERTENSION: ICD-10-CM

## 2017-08-10 DIAGNOSIS — E11.22 TYPE 2 DIABETES MELLITUS WITH STAGE 3 CHRONIC KIDNEY DISEASE, WITH LONG-TERM CURRENT USE OF INSULIN (H): ICD-10-CM

## 2017-08-10 DIAGNOSIS — Z79.4 TYPE 2 DIABETES MELLITUS WITH STAGE 3 CHRONIC KIDNEY DISEASE, WITH LONG-TERM CURRENT USE OF INSULIN (H): ICD-10-CM

## 2017-08-10 DIAGNOSIS — E78.00 PURE HYPERCHOLESTEROLEMIA: ICD-10-CM

## 2017-08-10 DIAGNOSIS — N18.30 TYPE 2 DIABETES MELLITUS WITH STAGE 3 CHRONIC KIDNEY DISEASE, WITH LONG-TERM CURRENT USE OF INSULIN (H): ICD-10-CM

## 2017-08-10 DIAGNOSIS — N18.30 CHRONIC KIDNEY DISEASE, STAGE III (MODERATE) (H): Primary | ICD-10-CM

## 2017-08-10 LAB
BUN SERPL-MCNC: 27.5 MG/DL (ref 7–21)
CALCIUM SERPL-MCNC: 8.6 MG/DL (ref 8.5–10.1)
CHLORIDE SERPLBLD-SCNC: 111.3 MMOL/L (ref 98–110)
CHOLEST SERPL-MCNC: 135.8 MG/DL (ref 0–200)
CHOLEST/HDLC SERPL: 3.5 {RATIO} (ref 0–5)
CO2 SERPL-SCNC: 23.2 MMOL/L (ref 20–32)
CREAT SERPL-MCNC: 1.4 MG/DL (ref 0.7–1.3)
GFR SERPL CREATININE-BSD FRML MDRD: 52.7 ML/MIN/1.7 M2
GLUCOSE SERPL-MCNC: 212.7 MG'DL (ref 70–99)
HBA1C MFR BLD: 8.5 % (ref 4.1–5.7)
HCT VFR BLD AUTO: 38.1 % (ref 40–53)
HDLC SERPL-MCNC: 39 MG/DL
HEMOGLOBIN: 11.9 G/DL (ref 13.3–17.7)
LDLC SERPL CALC-MCNC: 75 MG/DL (ref 0–129)
MCH RBC QN AUTO: 30.7 PG (ref 26.5–35)
MCHC RBC AUTO-ENTMCNC: 31.2 G/DL (ref 32–36)
MCV RBC AUTO: 98.2 FL (ref 78–100)
PLATELET # BLD AUTO: 312 K/UL (ref 150–450)
POTASSIUM SERPL-SCNC: 4.6 MMOL/DL (ref 3.2–4.6)
RBC # BLD AUTO: 3.9 M/UL (ref 4.4–5.9)
SODIUM SERPL-SCNC: 141.3 MMOL/L (ref 132–142)
TRIGL SERPL-MCNC: 111.3 MG/DL (ref 0–150)
TSH SERPL DL<=0.05 MIU/L-ACNC: 4.52 UIU/ML (ref 0.3–5)
VLDL CHOLESTEROL: 22.3 MG/DL (ref 7–32)
WBC # BLD AUTO: 5.1 K/UL (ref 4–11)

## 2017-08-10 NOTE — LETTER
August 14, 2017      Senthil Castillo  [address]  [place] [address]        Dear Senthil,    Your thyroid is working normally so that does not explain your tiredness.  We discussed that your average sugar A1C was a little above goal and hopefully you can get it back under an average of 8.0.  Your bad cholesterol LDL is low - and that is good.  You are mildly anemic related to your chronic kidney disease but not so much that you need Iron or that would explain tiredness.  Do follow up if the tiredness continues - I wonder if the propranolol (inderal) could be contributing? - that can be a side effect.  Take care!    Please see below for your test results.    Resulted Orders   Hemoglobin A1c ([facility])   Result Value Ref Range    Hemoglobin A1C 8.5 (H) 4.1 - 5.7 %   Basic Metabolic Panel ([place])   Result Value Ref Range    Urea Nitrogen 27.5 (H) 7.0 - 21.0 mg/dL    Calcium 8.6 8.5 - 10.1 mg/dL    Chloride 111.3 (H) 98.0 - 110.0 mmol/L    Carbon Dioxide 23.2 20.0 - 32.0 mmol/L    Creatinine 1.4 (H) 0.7 - 1.3 mg/dL    Glucose 212.7 (H) 70.0 - 99.0 mg'dL    Potassium 4.6 3.2 - 4.6 mmol/dL    Sodium 141.3 132.0 - 142.0 mmol/L    GFR Estimate 52.7 (L) >60.0 mL/min/1.7 m2    GFR Estimate If Black 63.7 >60.0 mL/min/1.7 m2   Lipid Panel ([facility])   Result Value Ref Range    Cholesterol 135.8 0.0 - 200.0 mg/dL    Cholesterol/HDL Ratio 3.5 0.0 - 5.0    HDL Cholesterol 39.0 (L) >40.0 mg/dL    LDL Cholesterol Calculated 75 0 - 129 mg/dL    Triglycerides 111.3 0.0 - 150.0 mg/dL    VLDL Cholesterol 22.3 7.0 - 32.0 mg/dL   Thyroid [place] ([facility])   Result Value Ref Range    TSH 4.52 0.30 - 5.00 uIU/mL    Narrative    Test performed by:  [facility]  [address] [place] [address]   CBC with Plt ([facility])   Result Value Ref Range    WBC 5.1 4.0 - 11.0 K/uL    RBC 3.9 (L) 4.4 - 5.9 M/uL    Hemoglobin 11.9 (L) 13.3 - 17.7 g/dL    Hematocrit 38.1 (L) 40.0 - 53.0 %    MCV 98.2 78.0 - 100.0 fL    MCH 30.7 26.5 -  35.0    MCHC 31.2 (L) 32.0 - 36.0 g/dL    Platelets 312.0 150.0 - 450.0 K/uL       If you have any questions, please call the clinic to make an appointment.    Sincerely,    Mo Lovett MD

## 2017-08-10 NOTE — PATIENT INSTRUCTIONS
Lab Results   Component Value Date    A1C 8.1 04/24/2017    A1C 9.9 01/19/2017    A1C 9.1 04/25/2016    A1C 9.2 02/12/2015    A1C 9.1 05/05/2014     Results for orders placed or performed in visit on 08/10/17   Hemoglobin A1c ([facility])   Result Value Ref Range    Hemoglobin A1C 8.5 (H) 4.1 - 5.7 %   Basic Metabolic Panel ([place])   Result Value Ref Range    Urea Nitrogen 27.5 (H) 7.0 - 21.0 mg/dL    Calcium 8.6 8.5 - 10.1 mg/dL    Chloride 111.3 (H) 98.0 - 110.0 mmol/L    Carbon Dioxide 23.2 20.0 - 32.0 mmol/L    Creatinine 1.4 (H) 0.7 - 1.3 mg/dL    Glucose 212.7 (H) 70.0 - 99.0 mg'dL    Potassium 4.6 3.2 - 4.6 mmol/dL    Sodium 141.3 132.0 - 142.0 mmol/L    GFR Estimate 52.7 (L) >60.0 mL/min/1.7 m2    GFR Estimate If Black 63.7 >60.0 mL/min/1.7 m2   Lipid Panel ([facility])   Result Value Ref Range    Cholesterol 135.8 0.0 - 200.0 mg/dL    Cholesterol/HDL Ratio 3.5 0.0 - 5.0    HDL Cholesterol 39.0 (L) >40.0 mg/dL    LDL Cholesterol Calculated 75 0 - 129 mg/dL    Triglycerides 111.3 0.0 - 150.0 mg/dL    VLDL Cholesterol 22.3 7.0 - 32.0 mg/dL   CBC with Plt ([facility])   Result Value Ref Range    WBC 5.1 4.0 - 11.0 K/uL    RBC 3.9 (L) 4.4 - 5.9 M/uL    Hemoglobin 11.9 (L) 13.3 - 17.7 g/dL    Hematocrit 38.1 (L) 40.0 - 53.0 %    MCV 98.2 78.0 - 100.0 fL    MCH 30.7 26.5 - 35.0    MCHC 31.2 (L) 32.0 - 36.0 g/dL    Platelets 312.0 150.0 - 450.0 K/uL     Let us know if dizziness worsens and we can ultrasound your carotids and also consider therapy.

## 2017-08-10 NOTE — PROGRESS NOTES
SUBJECTIVE:  This is a 76-year-old male well known to me.  He attends today for a number of reasons.   1.  He wants to follow up on his diabetes.  He has been working to have better blood sugar control this past year.  He feels it will not be quite as good as it was in April.  He has continued to lose a little weight and eating a better diet.     2.  He reports dizziness which comes and goes and overall it may be improving but nonetheless.  He states he gets dizzy and by this, he means that he is unsteady.  He doesn't feel as if he is going to black out.  His wife adds that he tires somewhat and he needs to hold onto something in order to sit down.  He notices it most often with changing positions such as this.  He believes his hearing is impaired.  He has had no focal deficits.   3.  In followup to our last visit, he describes soreness in his legs.  He does not have any back pain.  We previously discussed that he could have lumbar spinal stenosis.      REVIEW OF SYSTEMS:  Overall, his mood is good.  He acknowledges being stubborn and reluctant to change.  Wife states he becomes very tired and lacks energy.  We discussed the fact that his thyroid has been mildly abnormal at last lab check.      OBJECTIVE:   Vital signs are noted and are satisfactory.  Weight loss is noted.  ENT exam reveals that he has some cerumen in both ears and he agreed to have this removed today by an assistant.  After removal, both TMs are normal.  He isn't clinically anemic.  He has no goiter.  He has no carotid bruits.   His feet are in good condition without evidence of neuropathy or ulceration.  His labs returned during the encounter, we discussed the fact that his A1C is a little above goal at 8.5.  His other labs were satisfactory at this time and are stable.      ASSESSMENT:   1.  Diabetes type 2, not at goal.   2.  Hypertension, well controlled.   3.  Chronic kidney disease, stage III, stable.   4.  Orthostatic dizziness.   5.  Leg  cramps.   PLAN:  We talked about working up the dizziness.  He declines any referrals and will follow up if he wishes to this.  It does not seem indicated to obtain an MRI of his lumbar spine at this time as he is not complaining of any low back pain.     Overall, the visit today 25-minute visit was spent in counseling and coordination of care.  The patient was satisfied agreed upon plan.

## 2017-08-10 NOTE — MR AVS SNAPSHOT
After Visit Summary   8/10/2017    Senthil Castillo    MRN: [id number]           Patient Information     Date Of Birth          1940        Visit Information        Provider Department      8/10/2017 9:00 AM Mo Lovett MD [facility]        Today's Diagnoses     Chronic kidney disease, stage III (moderate)    -  1    Type 2 diabetes mellitus with stage 3 chronic kidney disease, with long-term current use of insulin (H)        Essential hypertension        Pure hypercholesterolemia        Chronic fatigue          Care Instructions    Lab Results   Component Value Date    A1C 8.1 04/24/2017    A1C 9.9 01/19/2017    A1C 9.1 04/25/2016    A1C 9.2 02/12/2015    A1C 9.1 05/05/2014     Results for orders placed or performed in visit on 08/10/17   Hemoglobin A1c ([facility])   Result Value Ref Range    Hemoglobin A1C 8.5 (H) 4.1 - 5.7 %   Basic Metabolic Panel ([place])   Result Value Ref Range    Urea Nitrogen 27.5 (H) 7.0 - 21.0 mg/dL    Calcium 8.6 8.5 - 10.1 mg/dL    Chloride 111.3 (H) 98.0 - 110.0 mmol/L    Carbon Dioxide 23.2 20.0 - 32.0 mmol/L    Creatinine 1.4 (H) 0.7 - 1.3 mg/dL    Glucose 212.7 (H) 70.0 - 99.0 mg'dL    Potassium 4.6 3.2 - 4.6 mmol/dL    Sodium 141.3 132.0 - 142.0 mmol/L    GFR Estimate 52.7 (L) >60.0 mL/min/1.7 m2    GFR Estimate If Black 63.7 >60.0 mL/min/1.7 m2   Lipid Panel ([facility])   Result Value Ref Range    Cholesterol 135.8 0.0 - 200.0 mg/dL    Cholesterol/HDL Ratio 3.5 0.0 - 5.0    HDL Cholesterol 39.0 (L) >40.0 mg/dL    LDL Cholesterol Calculated 75 0 - 129 mg/dL    Triglycerides 111.3 0.0 - 150.0 mg/dL    VLDL Cholesterol 22.3 7.0 - 32.0 mg/dL   CBC with Plt ([facility])   Result Value Ref Range    WBC 5.1 4.0 - 11.0 K/uL    RBC 3.9 (L) 4.4 - 5.9 M/uL    Hemoglobin 11.9 (L) 13.3 - 17.7 g/dL    Hematocrit 38.1 (L) 40.0 - 53.0 %    MCV 98.2 78.0 - 100.0 fL    MCH 30.7 26.5 - 35.0    MCHC 31.2 (L) 32.0 - 36.0 g/dL    Platelets 312.0 150.0 - 450.0 K/uL     Let us know if  dizziness worsens and we can ultrasound your carotids and also consider therapy.          Follow-ups after your visit        Follow-up notes from your care team     Return in about 3 months (around 11/10/2017).      Who to contact     Please call your clinic at [phone] to:    Ask questions about your health    Make or cancel appointments    Discuss your medicines    Learn about your test results    Speak to your doctor   If you have compliments or concerns about an experience at your clinic, or if you wish to file a complaint, please contact [facility] Patient Relations at [phone] or email us at [email or web address]         Additional Information About Your Visit        MyChart Information     [employer] is an electronic gateway that provides easy, online access to your medical records. With voxapp, you can request a clinic appointment, read your test results, renew a prescription or communicate with your care team.     To sign up for [employer] visit the website at [email or web address]   You will be asked to enter the access code listed below, as well as some personal information. Please follow the directions to create your username and password.     Your access code is: [email or web address]  Expires: 2017  2:32 PM     Your access code will  in 90 days. If you need help or a new code, please contact your [facility] Clinic or call [phone] for assistance.        Care EveryWhere ID     This is your Care EveryWhere ID. This could be used by other organizations to access your [place] medical records  [id number]        Your Vitals Were     Pulse Temperature Pulse Oximetry BMI (Body Mass Index)          66 98.4  F (36.9  C) (Oral) 97% 31.06 kg/m2         Blood Pressure from Last 3 Encounters:   08/10/17 137/83   17 166/89   17 (!) 165/96    Weight from Last 3 Encounters:   08/10/17 219 lb 9.6 oz (99.6 kg)   17 224 lb  9.6 oz (101.9 kg)   01/19/17 224 lb 12.8 oz (102 kg)              We Performed the Following     Basic Metabolic Panel ([place])     CBC with Plt ([facility])     Hemoglobin A1c ([facility])     Lipid Panel ([facility])     Thyroid [place] ([facility])        Primary Care Provider Office Phone # Fax #    Mo Lovett [phone]       [address] [address]        Equal Access to Services     MORELIA MARSH : Hadii aad ku hadasho Soomaali, waaxda luqadaha, qaybta kaalmada adeegyada, waxay idiin hayaan adeeg anand cao . So [facility] [phone].    ATENCIÓN: Si habla español, tiene a egan disposición servicios gratuitos de asistencia lingüística. Llame al [phone].    We comply with applicable federal civil rights laws and [place] laws. We do not discriminate on the basis of race, color, national origin, age, disability sex, sexual orientation or gender identity.            Thank you!     Thank you for choosing [facility]  for your care. Our goal is always to provide you with excellent care. Hearing back from our patients is one way we can continue to improve our services. Please take a few minutes to complete the written survey that you may receive in the mail after your visit with us. Thank you!             Your Updated Medication List - Protect others around you: Learn how to safely use, store and throw away your medicines at www.disposemymeds.org.          This list is accurate as of: 8/10/17 11:59 PM.  Always use your most recent med list.                   Brand Name Dispense Instructions for use Diagnosis    aspirin 81 MG tablet     93 tablet    Take 1 tablet (81 mg) by mouth daily    Chronic kidney disease, stage III (moderate), Type 2 diabetes mellitus with hyperglycemia, with long-term current use of insulin (H)       atorvastatin 40 MG tablet    LIPITOR    90 tablet    Take 1 tablet (40 mg) by mouth daily    Chronic kidney disease, stage III (moderate)        blood glucose monitoring test strip    ONE TOUCH ULTRA    1 Box    Use up to 3 times daily    Type 2 diabetes mellitus with stage 3 chronic kidney disease (H)       insulin pen needle 29G X 12.7MM    BD ULTRA-FINE    180 each    Use twice daily    Type 2 diabetes mellitus with diabetic chronic kidney disease (H)       lisinopril 40 MG tablet    PRINIVIL/ZESTRIL    180 tablet    Take 1 tablet (40 mg) by mouth 2 times daily    Proteinuria, Chronic kidney disease, stage III (moderate)       metFORMIN 1000 MG tablet    GLUCOPHAGE    180 tablet    Take 1 tablet (1,000 mg) by mouth 2 times daily (with meals)    Type 2 diabetes mellitus with hyperglycemia, with long-term current use of insulin (H)       NovoLIN MIX 70/30 VIAL injection   Generic drug:  insulin NPH-insulin regular      Take 20 u morning and 30 u evening    Type II or unspecified type diabetes mellitus without mention of complication, not stated as uncontrolled       primidone 50 MG tablet    MYSOLINE    62 tablet    Take 1/2 tablet (25 mg) at bedtime for 1 week, 1 tablet at bedtime for 1 week, 1 and 1/2 tablet at bedtime for 1 week, then 2 tablets at bedtime    Essential and other specified forms of tremor       * propranolol 160 MG 24 hr capsule    INDERAL LA    90 each    Take 1 capsule (160 mg) by mouth daily    Benign essential tremor       * propranolol 80 MG 24 hr capsule    INDERAL LA    93 capsule    Take 1 capsule (80 mg) by mouth At Bedtime    Benign essential tremor       * Notice:  This list has 2 medication(s) that are the same as other medications prescribed for you. Read the directions carefully, and ask your doctor or other care provider to review them with you.

## 2017-08-10 NOTE — NURSING NOTE
HEARING FREQUENCY:   Right Ear:  500 Hz: 25 db HL   1000 Hz: 20 db HL   2000 Hz: 30 db HL   4000 Hz: 25 db HL  Left Ear:  500 Hz: 25 db HL   1000 Hz: 20 db HL   2000 Hz: 20 db HL    4000 Hz: 20 db HL

## 2017-09-30 ENCOUNTER — HEALTH MAINTENANCE LETTER (OUTPATIENT)
Age: 77
End: 2017-09-30

## 2017-10-12 DIAGNOSIS — N18.30 TYPE 2 DIABETES MELLITUS WITH STAGE 3 CHRONIC KIDNEY DISEASE (H): ICD-10-CM

## 2017-10-12 DIAGNOSIS — E11.22 TYPE 2 DIABETES MELLITUS WITH STAGE 3 CHRONIC KIDNEY DISEASE (H): ICD-10-CM

## 2018-04-03 DIAGNOSIS — Z79.4 TYPE 2 DIABETES MELLITUS WITH STAGE 3 CHRONIC KIDNEY DISEASE, WITH LONG-TERM CURRENT USE OF INSULIN (H): ICD-10-CM

## 2018-04-03 DIAGNOSIS — Z00.00 ROUTINE GENERAL MEDICAL EXAMINATION AT A HEALTH CARE FACILITY: ICD-10-CM

## 2018-04-03 DIAGNOSIS — N18.30 TYPE 2 DIABETES MELLITUS WITH STAGE 3 CHRONIC KIDNEY DISEASE, WITH LONG-TERM CURRENT USE OF INSULIN (H): ICD-10-CM

## 2018-04-03 DIAGNOSIS — E11.22 TYPE 2 DIABETES MELLITUS WITH STAGE 3 CHRONIC KIDNEY DISEASE, WITH LONG-TERM CURRENT USE OF INSULIN (H): ICD-10-CM

## 2018-04-03 DIAGNOSIS — N18.30 CHRONIC KIDNEY DISEASE, STAGE III (MODERATE) (H): Primary | ICD-10-CM

## 2018-04-03 DIAGNOSIS — I25.10 ATHEROSCLEROSIS OF CORONARY ARTERY OF NATIVE HEART WITHOUT ANGINA PECTORIS, UNSPECIFIED VESSEL OR LESION TYPE: ICD-10-CM

## 2018-04-03 DIAGNOSIS — G25.0 BENIGN ESSENTIAL TREMOR: ICD-10-CM

## 2018-04-10 DIAGNOSIS — Z00.00 ROUTINE GENERAL MEDICAL EXAMINATION AT A HEALTH CARE FACILITY: ICD-10-CM

## 2018-04-10 DIAGNOSIS — N18.30 CHRONIC KIDNEY DISEASE, STAGE III (MODERATE) (H): ICD-10-CM

## 2018-04-10 DIAGNOSIS — E11.22 TYPE 2 DIABETES MELLITUS WITH STAGE 3 CHRONIC KIDNEY DISEASE, WITH LONG-TERM CURRENT USE OF INSULIN (H): ICD-10-CM

## 2018-04-10 DIAGNOSIS — G25.0 BENIGN ESSENTIAL TREMOR: ICD-10-CM

## 2018-04-10 DIAGNOSIS — Z79.4 TYPE 2 DIABETES MELLITUS WITH STAGE 3 CHRONIC KIDNEY DISEASE, WITH LONG-TERM CURRENT USE OF INSULIN (H): ICD-10-CM

## 2018-04-10 DIAGNOSIS — N18.30 TYPE 2 DIABETES MELLITUS WITH STAGE 3 CHRONIC KIDNEY DISEASE, WITH LONG-TERM CURRENT USE OF INSULIN (H): ICD-10-CM

## 2018-04-10 DIAGNOSIS — I25.10 ATHEROSCLEROSIS OF CORONARY ARTERY OF NATIVE HEART WITHOUT ANGINA PECTORIS, UNSPECIFIED VESSEL OR LESION TYPE: ICD-10-CM

## 2018-04-10 LAB
BUN SERPL-MCNC: 29.8 MG/DL (ref 7–21)
CALCIUM SERPL-MCNC: 9.2 MG/DL (ref 8.5–10.1)
CHLORIDE SERPLBLD-SCNC: 102.1 MMOL/L (ref 98–110)
CHOLEST SERPL-MCNC: 168.1 MG/DL (ref 0–200)
CHOLEST/HDLC SERPL: 3.5 {RATIO} (ref 0–5)
CO2 SERPL-SCNC: 28.3 MMOL/L (ref 20–32)
CREAT SERPL-MCNC: 1.6 MG/DL (ref 0.7–1.3)
CREAT UR-MCNC: 90.6 MG/DL
GFR SERPL CREATININE-BSD FRML MDRD: 45 ML/MIN/1.7 M2
GLUCOSE SERPL-MCNC: 156 MG'DL (ref 70–99)
HBA1C MFR BLD: 9.4 % (ref 4.1–5.7)
HDLC SERPL-MCNC: 47.4 MG/DL
LDLC SERPL CALC-MCNC: 98 MG/DL (ref 0–129)
MAGNESIUM SERPL-MCNC: 1.6 MG/DL (ref 1.8–2.6)
MICROALBUMIN UR-MCNC: 73.23 MG/DL (ref 0–1.99)
MICROALBUMIN/CREAT UR: 808.3 MG/G
POTASSIUM SERPL-SCNC: 4.9 MMOL/DL (ref 3.2–4.6)
PSA SERPL-MCNC: 2.9 NG/ML (ref 0–6.5)
SODIUM SERPL-SCNC: 136.7 MMOL/L (ref 132–142)
T4 FREE SERPL-MCNC: 0.9 NG/DL (ref 0.7–1.8)
TRIGL SERPL-MCNC: 111.4 MG/DL (ref 0–150)
TSH SERPL DL<=0.05 MIU/L-ACNC: 5.35 UIU/ML (ref 0.3–5)
VLDL CHOLESTEROL: 22.3 MG/DL (ref 7–32)

## 2018-04-16 ENCOUNTER — OFFICE VISIT (OUTPATIENT)
Dept: FAMILY MEDICINE | Facility: CLINIC | Age: 78
End: 2018-04-16
Payer: COMMERCIAL

## 2018-04-16 ENCOUNTER — TELEPHONE (OUTPATIENT)
Dept: PHARMACY | Facility: CLINIC | Age: 78
End: 2018-04-16

## 2018-04-16 VITALS
SYSTOLIC BLOOD PRESSURE: 195 MMHG | DIASTOLIC BLOOD PRESSURE: 82 MMHG | HEART RATE: 69 BPM | WEIGHT: 231 LBS | TEMPERATURE: 97.7 F | BODY MASS INDEX: 32.68 KG/M2

## 2018-04-16 DIAGNOSIS — N18.30 CHRONIC KIDNEY DISEASE, STAGE III (MODERATE) (H): ICD-10-CM

## 2018-04-16 DIAGNOSIS — Z79.4 TYPE 2 DIABETES MELLITUS WITH HYPERGLYCEMIA, WITH LONG-TERM CURRENT USE OF INSULIN (H): ICD-10-CM

## 2018-04-16 DIAGNOSIS — R80.1 PERSISTENT PROTEINURIA: ICD-10-CM

## 2018-04-16 DIAGNOSIS — G25.0 BENIGN ESSENTIAL TREMOR: ICD-10-CM

## 2018-04-16 DIAGNOSIS — E11.65 TYPE 2 DIABETES MELLITUS WITH HYPERGLYCEMIA, WITH LONG-TERM CURRENT USE OF INSULIN (H): ICD-10-CM

## 2018-04-16 DIAGNOSIS — I12.9 RENAL HYPERTENSION: Primary | ICD-10-CM

## 2018-04-16 RX ORDER — METOPROLOL SUCCINATE 100 MG/1
100 TABLET, EXTENDED RELEASE ORAL DAILY
Qty: 93 TABLET | Refills: 3 | Status: SHIPPED | OUTPATIENT
Start: 2018-04-16 | End: 2018-04-24

## 2018-04-16 RX ORDER — ATORVASTATIN CALCIUM 40 MG/1
40 TABLET, FILM COATED ORAL DAILY
Qty: 90 TABLET | Refills: 3 | Status: SHIPPED | OUTPATIENT
Start: 2018-04-16 | End: 2018-12-14

## 2018-04-16 RX ORDER — PROPRANOLOL HYDROCHLORIDE 80 MG/1
CAPSULE, EXTENDED RELEASE ORAL
Qty: 0.1 CAPSULE | Refills: 0 | Status: SHIPPED | OUTPATIENT
Start: 2018-04-16 | End: 2018-04-16

## 2018-04-16 RX ORDER — ASPIRIN 325 MG
325 TABLET ORAL EVERY OTHER DAY
COMMUNITY
End: 2018-12-14

## 2018-04-16 RX ORDER — LISINOPRIL 40 MG/1
40 TABLET ORAL
Qty: 180 TABLET | Refills: 3 | Status: SHIPPED | OUTPATIENT
Start: 2018-04-16 | End: 2018-12-14

## 2018-04-16 RX ORDER — PROPRANOLOL HYDROCHLORIDE 160 MG/1
160 CAPSULE, EXTENDED RELEASE ORAL DAILY
Qty: 90 EACH | Refills: 3 | Status: SHIPPED | OUTPATIENT
Start: 2018-04-16 | End: 2018-04-16

## 2018-04-16 RX ORDER — PROPRANOLOL HYDROCHLORIDE 160 MG/1
CAPSULE, EXTENDED RELEASE ORAL
Qty: 0.1 EACH | Refills: 0 | Status: SHIPPED | OUTPATIENT
Start: 2018-04-16 | End: 2018-04-16

## 2018-04-16 RX ORDER — PROPRANOLOL HYDROCHLORIDE 80 MG/1
80 CAPSULE, EXTENDED RELEASE ORAL AT BEDTIME
Qty: 7 CAPSULE | Refills: 0 | Status: SHIPPED | OUTPATIENT
Start: 2018-04-16 | End: 2018-04-24

## 2018-04-16 RX ORDER — PROPRANOLOL HYDROCHLORIDE 80 MG/1
80 CAPSULE, EXTENDED RELEASE ORAL AT BEDTIME
Qty: 93 CAPSULE | Refills: 3 | Status: SHIPPED | OUTPATIENT
Start: 2018-04-16 | End: 2018-04-16

## 2018-04-16 NOTE — PROGRESS NOTES
SUBJECTIVE       Senthil Castillo is a 77 year old  male with a PMH significant for:     Patient Active Problem List   Diagnosis     Chronic kidney disease, stage III (moderate)     Coronary atherosclerosis     Pure hypercholesterolemia     Essential hypertension     Proteinuria     Calculus of kidney     Inguinal hernia     Premature beats     History of colonic polyps     Type 2 diabetes mellitus with diabetic chronic kidney disease (H)     Benign essential tremor     He presents today for BP, DM and lab checks, and medication mgmt. He reports no new complaints and states that his prior symptoms of dizziness and fatigue have improved. He does state that he spent a month in [place] this winter where he gained a bit of weight (10 pounds) and did not manage his diabetes as well as usual (eating better than usual).     He checks his BP at convenience stores occasionally and reports an average reading of 140/90 mmHg. He endorses improved hydration, he has begun drinking carbonated sugar-free flavored water in order to stay more hydrated.     As for medication compliance he reports taking 8 units of 70/30 insulin BID, occasional use of primidone in the morning which he cuts in half (he says PM dosing causes strange dreams), and  QOD with an occasional second dose for aches/pains. All other meds are taken as prescribed.     Upon review of symptoms he denies changes in BM/increased constipation, dry skin, fatigue, and cold intolerance. He does endorse occasional muscle cramping and aches and pains.  See ROS below.    PMH, Medications and Allergies were reviewed and updated as needed.        REVIEW OF SYSTEMS     CONSTITUTIONAL: NEGATIVE for fever, chills. POSITIVE for recent weight gain.  INTEGUMENTARY/SKIN: POSITIVE for some skin dryness, not worse than usual.  CV: NEGATIVE for chest pain, palpitations or peripheral edema  GI: NEGATIVE for nausea, abdominal pain, or change in bowel habits. POSITIVE for  heartburn.  MUSCULOSKELETAL: NEGATIVE for significant arthralgias or myalgia, POSITIVE for occasional cramping.  NEURO: NEGATIVE for weakness, dizziness or paresthesias - reports dizziness is resolved.  Benign Essential Tremor persists - no worse, no better - does not seem helped by meds.  ENDOCRINE: NEGATIVE for temperature intolerance, no hair changes        OBJECTIVE     Vitals:    04/16/18 0806 04/16/18 0809   BP: (!) 194/102 195/82   BP Location: Left arm    Patient Position: Sitting    Cuff Size: Adult Large    Pulse: 69    Temp: 97.7  F (36.5  C)    TempSrc: Oral    Weight: 231 lb (104.8 kg)      Body mass index is 32.68 kg/(m^2).  Note BP is elevated  Constitutional: Awake, alert, cooperative, no apparent distress, and appears stated age.  Eyes: Lids and lashes normal, sclera clear, conjunctiva normal.  ENT: Normocephalic, without obvious abnormality, atramatic  Lungs: No increased work of breathing, good air exchange, clear to auscultation bilaterally, no crackles or wheezing.  Cardiovascular: Regular rate and rhythm, normal S1 and S2, no S3 or S4, and no murmur noted.  Musculoskeletal: No redness, warmth, or swelling of the joints.  Full range of motion noted. Tone is normal.  Neurologic: Awake, alert, oriented to name, place and time.  Cranial nerves II-XII are grossly intact.  Motor is 5 out of 5 bilaterally. Bilateral tremors of the hands noted.  Neuropsychiatric: Normal affect, mood, orientation, memory and insight.  Skin: No rashes, erythema, pallor, petechia or purpura.    Results for orders placed or performed in visit on 04/10/18   PSA Screening ([facility])   Result Value Ref Range    PSA 2.9 0.0 - 6.5 ng/mL    Narrative    Test performed by:  [facility] [address] [place] [address]  Method is Abbott Prostate-Specific Antigen (PSA)  Standard-WHO 1st International (90:10)   Magnesium ([facility])   Result Value Ref Range    Magnesium 1.6 (L) 1.8 - 2.6 mg/dL    Narrative     Test performed by:  [facility]  [address] [place] [address]   Microalbumin Random Ur ([facility])   Result Value Ref Range    Microalbumin, Urine 73.23 (H) 0.00 - 1.99 mg/dL    Creatinine, Urine 90.6 mg/dL    Albumin Urine mg/g Cr 808.3 (H) <=19.9 mg/g    Narrative    Test performed by:  [facility]  [address] [place] [address]  Microalbumin, Random Urine  <2.0 mg/dL . . . . . . . . Normal  3.0-30.0 mg/dL . . . . . . Microalbuminuria  >30.0 mg/dL . . . . . .  . Clinical Proteinuria  Microalbumin/Creatinine Ratio, Random Urine  <20 mg/g . . . . .. . . . Normal   mg/g . . . . . . . Microalbuminuria  >300 mg/g . . . . . . . . Clinical Proteinuria   Lipid Panel ([facility])   Result Value Ref Range    Cholesterol 168.1 0.0 - 200.0 mg/dL    Cholesterol/HDL Ratio 3.5 0.0 - 5.0    HDL Cholesterol 47.4 >40.0 mg/dL    LDL Cholesterol Calculated 98 0 - 129 mg/dL    Triglycerides 111.4 0.0 - 150.0 mg/dL    VLDL Cholesterol 22.3 7.0 - 32.0 mg/dL   Basic Metabolic Panel ([facility])   Result Value Ref Range    Urea Nitrogen 29.8 (H) 7.0 - 21.0 mg/dL    Calcium 9.2 8.5 - 10.1 mg/dL    Chloride 102.1 98.0 - 110.0 mmol/L    Carbon Dioxide 28.3 20.0 - 32.0 mmol/L    Creatinine 1.6 (H) 0.7 - 1.3 mg/dL    Glucose 156.0 (H) 70.0 - 99.0 mg'dL    Potassium 4.9 (H) 3.2 - 4.6 mmol/dL    Sodium 136.7 132.0 - 142.0 mmol/L    GFR Estimate 45.0 (L) >60.0 mL/min/1.7 m2    GFR Estimate If Black 54.5 (L) >60.0 mL/min/1.7 m2   Hemoglobin A1c ([facility])   Result Value Ref Range    Hemoglobin A1C 9.4 (H) 4.1 - 5.7 %   Thyroid [place] ([facility])   Result Value Ref Range    TSH 5.35 (H) 0.30 - 5.00 uIU/mL    Narrative    Test performed by:  [facility]  [address] [place] [address]   T4  Free ([facility])   Result Value Ref Range    T4 Free 0.9 0.7 - 1.8 ng/dL    Narrative    Test performed by:  [facility] [address] [place] [address]       Labs notable for:  -  Hyperkalemia (4.9)  - Decreased GFR (45)  - Lipid panel WNL  - Random glucose 156  - Hypomagnesemia (1.6)  - Elevated hemoglobin A1C (9.4, up from 8.5 at last visit Aug 2017)  - Microalbuminuria (73.23, down from 148.10 at Apr 2017 visit)  - PSA WNL (2.9)  - Elevated TSH (5.35) with normal free T4 (0.9)    ASSESSMENT AND PLAN     1. Benign essential tremor  Prior to this visit pt has been treated with propranolol at a dosing regimen of 160mg Qmorning and 80mg qHs. However his BP appeared uncontrolled at this visit. We counseled him on options of adding another antihypertensive to his current regimen (lisinopril and propranolol) but he was very resistant to adding another drug, he particularly does not want a diuretic. We struck up a compromise in switching his propranolol to another beta blocker that is better for HTN, metoprolol.   - See metoprolol details below  - Taper off propranolol per pharmacy recs, they will call him with these details     2. Chronic kidney disease, stage III (moderate)  Pt's GFR has decreased further since last visit, but he is asymptomatic at this time - no leg swelling, pulmonary edema/SOB, or other complaints. No changes to these meds today.  - atorvastatin (LIPITOR) 40 MG tablet; Take 1 tablet (40 mg) by mouth daily  Dispense: 90 tablet; Refill: 3  - lisinopril (PRINIVIL/ZESTRIL) 40 MG tablet; Take 1 tablet (40 mg) by mouth 2 times daily  Dispense: 180 tablet; Refill: 3    3. Persistent proteinuria  Improving with lisinopril regimen, will continue w/ monitoring of kidney function.  - lisinopril (PRINIVIL/ZESTRIL) 40 MG tablet; Take 1 tablet (40 mg) by mouth 2 times daily  Dispense: 180 tablet; Refill: 3    4. Type 2 diabetes mellitus with hyperglycemia, with long-term current use of insulin (H)  Diabetes less controlled with climb in A1C from 8.1 to 9.4 in the last year, along with ~15 lb weight gain. Pt reports taking 8 units of self-purchased insulin BID - which is a reduction - not  addressed in person today. He is motivated to lose weight both for diabetes and BP control and reports that he will return in 4 months to see if diet/exercise has helped either of these. Of note, pt's labs show possibility of very mild hypothyroidism but he is asymptomatic and he wishes to not treat that at this time.  - metFORMIN (GLUCOPHAGE) 1000 MG tablet; Take 1 tablet (1,000 mg) by mouth 2 times daily (with meals)  Dispense: 180 tablet; Refill: 3  - Pharmacy plans to call to go over insulin regimen over phone and make recommendations    5. Renal hypertension  Pt reports good BP control on home readings but repeat checks today were both >190 systolic. As stated above, he prefers to change his meds as little as possible and will start with the metoprolol change and weight loss first. Pharmacy would like to see him back in 1-2 wks and to have him monitor BP at home with this change. They will call with these details.  - metoprolol succinate (TOPROL-XL) 100 MG 24 hr tablet; Take 1 tablet (100 mg) by mouth daily  Dispense: 93 tablet; Refill: 3    Patient does not want to try magnesium replacement - is not taking PPI    RTC in 4 months or sooner if develops new or worsening symptoms. Follow up by phone in 1-2  Days by pharmacy to advise on conversion from propranolol to metoprolol and explore insulin dosing.     Rosmery Moya MS3 working with Dr Mo Lovett    Total visit time was 45 mins, all of which was face to face MD time, and over 50% of this time was spent in counseling and coordination of care.    Preceptor Attestation:  I was present with the medical student who participated in the service and in the documentation of this note. I have verified the history and personally performed the physical exam and medical decision making. I have verified the content of the note, which accurately reflects my assessment of the patient and the plan of care.   Supervising Physician:  Mo Lovett MD

## 2018-04-16 NOTE — MR AVS SNAPSHOT
After Visit Summary   4/16/2018    Senthil Castillo    MRN: [id number]           Patient Information     Date Of Birth          1940        Visit Information        Provider Department      4/16/2018 8:00 AM Mo Lovett MD [facility]        Today's Diagnoses     Renal hypertension    -  1    Benign essential tremor        Chronic kidney disease, stage III (moderate)        Persistent proteinuria        Type 2 diabetes mellitus with hyperglycemia, with long-term current use of insulin (H)          Care Instructions    Results for orders placed or performed in visit on 04/10/18   PSA Screening (Grab[facility])   Result Value Ref Range    PSA 2.9 0.0 - 6.5 ng/mL    Narrative    Test performed by:  [facility]  [address] [place] [address]  Method is Abbott Prostate-Specific Antigen (PSA)  Standard-WHO 1st International (90:10)   Magnesium (Grab[facility])   Result Value Ref Range    Magnesium 1.6 (L) 1.8 - 2.6 mg/dL    Narrative    Test performed by:  [facility]  [address] [place] [address]   Microalbumin Random Ur (GrabCAD)   Result Value Ref Range    Microalbumin, Urine 73.23 (H) 0.00 - 1.99 mg/dL    Creatinine, Urine 90.6 mg/dL    Albumin Urine mg/g Cr 808.3 (H) <=19.9 mg/g    Narrative    Test performed by:  [facility]  [address] [place] [address]  Microalbumin, Random Urine  <2.0 mg/dL . . . . . . . . Normal  3.0-30.0 mg/dL . . . . . . Microalbuminuria  >30.0 mg/dL . . . . . .  . Clinical Proteinuria  Microalbumin/Creatinine Ratio, Random Urine  <20 mg/g . . . . .. . . . Normal   mg/g . . . . . . . Microalbuminuria  >300 mg/g . . . . . . . . Clinical Proteinuria   Lipid Panel (LabDAQ)   Result Value Ref Range    Cholesterol 168.1 0.0 - 200.0 mg/dL    Cholesterol/HDL Ratio 3.5 0.0 - 5.0    HDL Cholesterol 47.4 >40.0 mg/dL    LDL Cholesterol Calculated 98 0 - 129 mg/dL    Triglycerides 111.4 0.0 - 150.0 mg/dL    VLDL  Cholesterol 22.3 7.0 - 32.0 mg/dL   Basic Metabolic Panel (LabDAQ)   Result Value Ref Range    Urea Nitrogen 29.8 (H) 7.0 - 21.0 mg/dL    Calcium 9.2 8.5 - 10.1 mg/dL    Chloride 102.1 98.0 - 110.0 mmol/L    Carbon Dioxide 28.3 20.0 - 32.0 mmol/L    Creatinine 1.6 (H) 0.7 - 1.3 mg/dL    Glucose 156.0 (H) 70.0 - 99.0 mg'dL    Potassium 4.9 (H) 3.2 - 4.6 mmol/dL    Sodium 136.7 132.0 - 142.0 mmol/L    GFR Estimate 45.0 (L) >60.0 mL/min/1.7 m2    GFR Estimate If Black 54.5 (L) >60.0 mL/min/1.7 m2   Hemoglobin A1c (LabDAQ)   Result Value Ref Range    Hemoglobin A1C 9.4 (H) 4.1 - 5.7 %   Thyroid [place] ([facility])   Result Value Ref Range    TSH 5.35 (H) 0.30 - 5.00 uIU/mL    Narrative    Test performed by:  [facility]  [address] [place] [address]   T4  Free ([facility])   Result Value Ref Range    T4 Free 0.9 0.7 - 1.8 ng/dL    Narrative    Test performed by:  [facility]  [address] [place] [address]         Lab Results   Component Value Date    A1C 9.4 04/10/2018    A1C 8.5 08/10/2017    A1C 8.1 04/24/2017    A1C 9.9 01/19/2017    A1C 9.1 04/25/2016     Goal A1c - under 8.0    Goal blood pressure - under 150 / 90    Could take Magnesium replacement to see if you feel differently and could take thyroid replacement - will recheck next visit.    Continue drinking 1-2 pints water daily          Follow-ups after your visit        Follow-up notes from your care team     Return in about 4 months (around 8/16/2018), or if symptoms worsen or fail to improve.      Who to contact     Please call your clinic at [phone] to:    Ask questions about your health    Make or cancel appointments    Discuss your medicines    Learn about your test results    Speak to your doctor            Additional Information About Your Visit        [email or web address] Information     MyChart is an electronic gateway that provides easy, online access to your medical records. With [employer], you can request a clinic  appointment, read your test results, renew a prescription or communicate with your care team.     To sign up for [employer] visit the website at [email or web address]   You will be asked to enter the access code listed below, as well as some personal information. Please follow the directions to create your username and password.     Your access code is: [email or web address]  Expires: 7/15/2018  8:59 AM     Your access code will  in 90 days. If you need help or a new code, please contact your [facility] or call [phone] for assistance.        Care EveryWhere ID     This is your Care EveryWhere ID. This could be used by other organizations to access your [place] medical records  [id number]        Your Vitals Were     Pulse Temperature BMI (Body Mass Index)             69 97.7  F (36.5  C) (Oral) 32.68 kg/m2          Blood Pressure from Last 3 Encounters:   18 195/82   08/10/17 137/83   17 166/89    Weight from Last 3 Encounters:   18 231 lb (104.8 kg)   08/10/17 219 lb 9.6 oz (99.6 kg)   17 224 lb 9.6 oz (101.9 kg)              Today, you had the following     No orders found for display         Today's Medication Changes          These changes are accurate as of 18  8:59 AM.  If you have any questions, ask your nurse or doctor.               Start taking these medicines.        Dose/Directions    metoprolol succinate 100 MG 24 hr tablet   Commonly known as:  TOPROL-XL   Used for:  Renal hypertension        Dose:  100 mg   Take 1 tablet (100 mg) by mouth daily   Quantity:  93 tablet   Refills:  3         These medicines have changed or have updated prescriptions.        Dose/Directions    aspirin 325 MG tablet   This may have changed:  Another medication with the same name was removed. Continue taking this medication, and follow the directions you see here.        Dose:  325 mg   Take 325 mg by mouth every other day   Refills:  0            Where to  get your medicines      These medications were sent to [facility] [address] [facility] [place] [facility], [place] - [address]  [address], [facility] [address]     Phone:  [phone]     atorvastatin 40 MG tablet    lisinopril 40 MG tablet    metFORMIN 1000 MG tablet    metoprolol succinate 100 MG 24 hr tablet                Primary Care Provider Office Phone # Fax #    Mo Lovett [phone]       [address] [address]        Equal Access to Services     MORELIA MARSH : Hadii aad ku hadasho Soomaali, waaxda luqadaha, qaybta kaalmada adeegyada, waxay idiin hayaan adeeg prabhaaramerry cao . So [facility] [phone].    ATENCIÓN: Si habla español, tiene a egan disposición servicios gratuitos de asistencia lingüística. [facility] [phone].    We comply with applicable federal civil rights laws and [place] laws. We do not discriminate on the basis of race, color, national origin, age, disability, sex, sexual orientation, or gender identity.            Thank you!     Thank you for choosing [facility]  for your care. Our goal is always to provide you with excellent care. Hearing back from our patients is one way we can continue to improve our services. Please take a few minutes to complete the written survey that you may receive in the mail after your visit with us. Thank you!             Your Updated Medication List - Protect others around you: Learn how to safely use, store and throw away your medicines at www.disposemymeds.org.          This list is accurate as of 4/16/18  8:59 AM.  Always use your most recent med list.                   Brand Name Dispense Instructions for use Diagnosis    aspirin 325 MG tablet      Take 325 mg by mouth every other day        atorvastatin 40 MG tablet    LIPITOR    90 tablet    Take 1 tablet (40 mg) by mouth daily    Chronic kidney disease, stage III (moderate)       blood glucose monitoring test strip    ONETOUCH ULTRA    100 strip     Use up to 3 times daily    Type 2 diabetes mellitus with stage 3 chronic kidney disease (H)       insulin pen needle 29G X 12.7MM    BD ULTRA-FINE    180 each    Use twice daily    Type 2 diabetes mellitus with diabetic chronic kidney disease (H)       lisinopril 40 MG tablet    PRINIVIL/ZESTRIL    180 tablet    Take 1 tablet (40 mg) by mouth 2 times daily    Persistent proteinuria, Chronic kidney disease, stage III (moderate)       metFORMIN 1000 MG tablet    GLUCOPHAGE    180 tablet    Take 1 tablet (1,000 mg) by mouth 2 times daily (with meals)    Type 2 diabetes mellitus with hyperglycemia, with long-term current use of insulin (H)       metoprolol succinate 100 MG 24 hr tablet    TOPROL-XL    93 tablet    Take 1 tablet (100 mg) by mouth daily    Renal hypertension       NovoLIN MIX 70/30 VIAL injection   Generic drug:  insulin NPH-insulin regular      Take 20 u morning and 30 u evening    Type II or unspecified type diabetes mellitus without mention of complication, not stated as uncontrolled       primidone 50 MG tablet    MYSOLINE    62 tablet    Take 1/2 tablet (25 mg) at bedtime for 1 week, 1 tablet at bedtime for 1 week, 1 and 1/2 tablet at bedtime for 1 week, then 2 tablets at bedtime    Essential and other specified forms of tremor

## 2018-04-16 NOTE — PATIENT INSTRUCTIONS
Results for orders placed or performed in visit on 04/10/18   PSA Screening ([facility])   Result Value Ref Range    PSA 2.9 0.0 - 6.5 ng/mL    Narrative    Test performed by:  [facility]  [address] [place] [address]  Method is Abbott Prostate-Specific Antigen (PSA)  Standard-WHO 1st International (90:10)   Magnesium ([facility])   Result Value Ref Range    Magnesium 1.6 (L) 1.8 - 2.6 mg/dL    Narrative    Test performed by:  [facility]  [address] [place] [address]   Microalbumin Random Ur ([facility])   Result Value Ref Range    Microalbumin, Urine 73.23 (H) 0.00 - 1.99 mg/dL    Creatinine, Urine 90.6 mg/dL    Albumin Urine mg/g Cr 808.3 (H) <=19.9 mg/g    Narrative    Test performed by:  [facility] [address] [place] [address]  Microalbumin, Random Urine  <2.0 mg/dL . . . . . . . . Normal  3.0-30.0 mg/dL . . . . . . Microalbuminuria  >30.0 mg/dL . . . . . .  . Clinical Proteinuria  Microalbumin/Creatinine Ratio, Random Urine  <20 mg/g . . . . .. . . . Normal   mg/g . . . . . . . Microalbuminuria  >300 mg/g . . . . . . . . Clinical Proteinuria   Lipid Panel (LabDAQ)   Result Value Ref Range    Cholesterol 168.1 0.0 - 200.0 mg/dL    Cholesterol/HDL Ratio 3.5 0.0 - 5.0    HDL Cholesterol 47.4 >40.0 mg/dL    LDL Cholesterol Calculated 98 0 - 129 mg/dL    Triglycerides 111.4 0.0 - 150.0 mg/dL    VLDL Cholesterol 22.3 7.0 - 32.0 mg/dL   Basic Metabolic Panel (LabDAQ)   Result Value Ref Range    Urea Nitrogen 29.8 (H) 7.0 - 21.0 mg/dL    Calcium 9.2 8.5 - 10.1 mg/dL    Chloride 102.1 98.0 - 110.0 mmol/L    Carbon Dioxide 28.3 20.0 - 32.0 mmol/L    Creatinine 1.6 (H) 0.7 - 1.3 mg/dL    Glucose 156.0 (H) 70.0 - 99.0 mg'dL    Potassium 4.9 (H) 3.2 - 4.6 mmol/dL    Sodium 136.7 132.0 - 142.0 mmol/L    GFR Estimate 45.0 (L) >60.0 mL/min/1.7 m2    GFR Estimate If Black 54.5 (L) >60.0 mL/min/1.7 m2   Hemoglobin A1c (LabDAQ)   Result Value Ref Range     Hemoglobin A1C 9.4 (H) 4.1 - 5.7 %   Thyroid [place] ([facility])   Result Value Ref Range    TSH 5.35 (H) 0.30 - 5.00 uIU/mL    Narrative    Test performed by:  [facility]  [address] [place] [address]   T4  Free ([facility])   Result Value Ref Range    T4 Free 0.9 0.7 - 1.8 ng/dL    Narrative    Test performed by:  [facility] [address] [place] [address]         Lab Results   Component Value Date    A1C 9.4 04/10/2018    A1C 8.5 08/10/2017    A1C 8.1 04/24/2017    A1C 9.9 01/19/2017    A1C 9.1 04/25/2016     Goal A1c - under 8.0    Goal blood pressure - under 150 / 90    Could take Magnesium replacement to see if you feel differently and could take thyroid replacement - will recheck next visit.    Continue drinking 1-2 pints water daily

## 2018-04-16 NOTE — TELEPHONE ENCOUNTER
Medication Management Question                                                       Senthil was referred by Dr. Lovett for pharmacy services for medication titration question. Called patient.    Subjective/Objective                                                         Senthil was previously utilizing propranolol  mg in the morning and 80 mg in the evening for his tremor. He has a history of hypertension, and propranolol has not been effective at managing his tremor. It was discussed with Dr. Lovett that he would be willing to only change his beta blocker to manage his hypertension rather than addition of other antihypertensives.     Patient Active Problem List   Diagnosis     Chronic kidney disease, stage III (moderate)     Coronary atherosclerosis     Pure hypercholesterolemia     Essential hypertension     Proteinuria     Calculus of kidney     Inguinal hernia     Premature beats     History of colonic polyps     Type 2 diabetes mellitus with diabetic chronic kidney disease (H)     Benign essential tremor     Current Outpatient Prescriptions   Medication Sig Dispense Refill     atorvastatin (LIPITOR) 40 MG tablet Take 1 tablet (40 mg) by mouth daily 90 tablet 3     lisinopril (PRINIVIL/ZESTRIL) 40 MG tablet Take 1 tablet (40 mg) by mouth 2 times daily 180 tablet 3     metFORMIN (GLUCOPHAGE) 1000 MG tablet Take 1 tablet (1,000 mg) by mouth 2 times daily (with meals) 180 tablet 3     aspirin 325 MG tablet Take 325 mg by mouth every other day       metoprolol succinate (TOPROL-XL) 100 MG 24 hr tablet Take 1 tablet (100 mg) by mouth daily 93 tablet 3     propranolol (INDERAL LA) 80 MG 24 hr capsule Take 1 capsule (80 mg) by mouth At Bedtime 7 capsule 0     blood glucose monitoring (ONE TOUCH ULTRA) test strip Use up to 3 times daily 100 strip 11     insulin pen needle (BD ULTRA-FINE) 29G X 12.7MM Use twice daily 180 each 3     primidone (MYSOLINE) 50 MG tablet Take 1/2 tablet (25 mg) at bedtime for 1 week, 1  "tablet at bedtime for 1 week, 1 and 1/2 tablet at bedtime for 1 week, then 2 tablets at bedtime 62 tablet 11     insulin NPH-insulin regular MIX (NOVOLIN MIX VIAL) (70-30) 100 UNIT/ML Take 20 u morning and 30 u evening         Social History   Substance Use Topics     Smoking status: Never Smoker     Smokeless tobacco: Never Used     Alcohol use Yes      Comment: beer once in a while       BP Readings from Last 3 Encounters:   04/16/18 195/82   08/10/17 137/83   04/24/17 166/89       The 10-year ASCVD risk score (Damian ARAGON Jr, et al., 2013) is: 73.2%    Values used to calculate the score:      Age: 77 years      Sex: Male      Is Non- : No      Diabetic: Yes      Tobacco smoker: No      Systolic Blood Pressure: 195 mmHg      Is BP treated: Yes      HDL Cholesterol: 47.4 mg/dL      Total Cholesterol: 168.1 mg/dL      Assessment/Plan                                                       Completed at this visit     Hypertension    Patient is going to decrease propranolol dose to 80 mg ER once daily in the evening for one week, and will initiate metoprolol  mg at the same time. He will start this \"taper\" tomorrow, 4/17/18.     Though there are no controlled trials showing equivalent potency between beta-blockers, a rough estimate of equivalency between the two medications is as follows:     Propranolol ER 80 mg = 100 mg metoprolol    Therefore, patient's previous daily dose of propranolol  mg = 300 mg metoprolol    Will start at a dose of metoprolol  mg.     Patient was educated about adverse effects of metoprolol including dizziness, fatigue, and lightheadedness.     Senthil was instructed to take his blood pressure at home and to alert the nurse here at the clinic of his blood pressure readings each week.     To be completed at a future visit    Assess hypertension management    Review home blood pressure readings    Options for treatment and/or follow-up care were reviewed with " the patientPastor Ndiaye was engaged and actively involved in the decision making process, verbalized understanding of the options discussed, and was satisfied with the final plan.    Follow-up                                                       Patient should follow up with Dr. Lovett.  Patient was provided with written instructions/medication list via telephone call.     Dr. Lovett was provided the recommendations above  via routed note and Dr. Lovett was available for supervision during this visit and is the authorizing prescriber for this visit through the pharmacist collaborative practice agreement.    Denise RainesD Student    Drug therapy problems identified  1. Propranolol - Efficacy - more effective drug available - change drug - resolved    # of medical conditions addressed: 1  # of medications addressed: 2  # of medication discrepancies identified: did not address  # of DTP identified: 1  Time spent: 30 minutes  Level of service: 2 nc    The student acted as scribe and the encounter documented was completely performed by myself. I have reviewed and verified the student s documentation and found it to be correct and complete.  Nithya Eisenberg, Pharm.D.

## 2018-04-24 ENCOUNTER — TELEPHONE (OUTPATIENT)
Dept: FAMILY MEDICINE | Facility: CLINIC | Age: 78
End: 2018-04-24

## 2018-04-24 DIAGNOSIS — I12.9 RENAL HYPERTENSION: ICD-10-CM

## 2018-04-24 RX ORDER — METOPROLOL SUCCINATE 100 MG/1
200 TABLET, EXTENDED RELEASE ORAL DAILY
Qty: 180 TABLET | Refills: 3 | Status: SHIPPED | OUTPATIENT
Start: 2018-04-24 | End: 2018-12-14

## 2018-04-24 RX ORDER — METOPROLOL SUCCINATE 100 MG/1
200 TABLET, EXTENDED RELEASE ORAL DAILY
Qty: 180 TABLET | Refills: 3 | Status: SHIPPED | OUTPATIENT
Start: 2018-04-24 | End: 2018-04-24

## 2018-04-24 NOTE — TELEPHONE ENCOUNTER
"Pharmacy Note    I called patient to follow up home BPs after decreasing Propranolol to 80mg QD (from 240mg QD) and starting Metoprolol XL 100mg QD.  He misunderstood directions and stopped the propranolol completely 2 days ago.  He denies any missed doses of metoprolol XL since it was started last week, and denies any missed doses of lisinopril 40mg BID.    He checked his BP at [facility] on the machine late last week (doesn't remember exact day) and it was \"200 over something\".  He did not write it down and doesn't remember the diastolic.  He does not remember the pulse either.  The he checked it again, either yesterday or the day before (he doesn't remember) and there was a health fair at [facility] and there was a nurse checking BPs, and it was 195/119 (he did not get the HR at that time and didn't ask).    He denies any symptoms of headache, blurred vision, vision changes, weakness (especially on one side) or feeling ANY different that he did before the med changes.  He said his tremor is a little worse, but that is it.    I explained to him that this is a dangerously high BP and that I would recommend that he go to the ER, but he declined and said he would go to [facility] this afternoon and get his BP checked and call it in.  I again explained that he is at risk for a stroke but he did not want to go to the ER right now.  I instructed him that if his BP was the same or higher, or if he developed any symptoms of headache, vision changes, weakness, that he should go to the ER. He said OK.    I recommended to him that the more effective thing for his BP was to add a diuretic like HCTZ or chlorthalidone today, and that increasing the metoprolol would not be enough to lower his BP a great amount.  He declined and said he did not like to take too many meds and that he would rather just increase the metoprolol to 200mg QD.  I again stated that this would not be the most effective med change and that adding a " thiazide diuretic would be more likely to lower his BP significantly, but he did not want to do this.  Sent Rx for higher dose of metoprolol.    I instructed pt that after today, he check his BP and HR at least 3 times to write down the numbers.    I will not be here this afternoon, so I discussed with Chepe (triage nurse) and if he doesn't call his BP in later today, she should call him to follow up.    Routed note to Dr. Lovett and GIUSEPPE Mo.    Nithya Eisenberg, Pharm.D.

## 2018-04-24 NOTE — TELEPHONE ENCOUNTER
Notified of very high BP reading today, I called Kip.  He did take second dose of Metoprolol 100 mgs but has not yet re-checked his BP.  He feels fine.  He assures me that he will check his BP within next 24 hours and notify me of result.  He does not want to go to ER.  For now, will continue with Metoprolol 200mgs daily (or he may take 100 BID).  If BP remains elevated, will need to be seen and medications adjusted - he understands.    Will  daily dose and send script for sphygmomanometer too (he believes his home cuff is inaccurate).

## 2018-04-25 ENCOUNTER — TELEPHONE (OUTPATIENT)
Dept: FAMILY MEDICINE | Facility: CLINIC | Age: 78
End: 2018-04-25

## 2018-04-25 NOTE — TELEPHONE ENCOUNTER
Patient informed and verbalized understanding he will continue on the 2 metoprolol per day and will call either Monday or Friday with is bp readings. /GIUSEPPE Arzate

## 2018-04-25 NOTE — TELEPHONE ENCOUNTER
Excellent, this sounds much better.  We'll continue with this treatment plan and anticipate BP remains well controlled.  He should FU in person if any concerning symptoms arise or BP becomes uncontrolled again.  MELLY Lovett

## 2018-04-25 NOTE — TELEPHONE ENCOUNTER
Return call from patient with is bp readings. States his bp yesterday was 175/107 at 545 with a pulse of 75 and around 6 pm yesterday it was 153/88 with a pulse 75. Today he took his bp at 1 pm and it was 148/86 pulse 74 and 5-10 after that it was 123/79 pulse 73. States it was 133/79 at 2 pm pulse of 73. He states he will monitor his bp every couple days and give the clinic a call with his readings once weekly. /GIUSEPPE Arzate  Routed to Dr. Lovett

## 2018-04-30 ENCOUNTER — TELEPHONE (OUTPATIENT)
Dept: FAMILY MEDICINE | Facility: CLINIC | Age: 78
End: 2018-04-30

## 2018-04-30 NOTE — TELEPHONE ENCOUNTER
[facility] phone call message- general phone call:    Reason for call: He needs to leave his bp levels with the nurse but wanted a call back to talk to you about them..    Return call needed: Yes    OK to leave a message on voice mail? Yes    Primary language: English      needed? No    Call taken on April 30, 2018 at 12:42 PM by Maisha Hardin

## 2018-04-30 NOTE — TELEPHONE ENCOUNTER
04/30  1126   133/81 68  04/27    0430 144/82 83 ten minutes later bp was 133/84     He states that since starting the medication he has noticed that he has a cough and thought that it could have been related to the medication but now feels that it is just a cold bug. Patient states he will continue to monitor his bp but wont call in unless he has to. /GIUSEPPE Arzate  Routed to Dr. Lovett

## 2018-04-30 NOTE — TELEPHONE ENCOUNTER
Noted- much better BP results - and pulse is in range.  I doubt this med is causing cough - he's been on Lisinopril for years, much more likely.    Next time you are talking with him, inquire if he wants to sign up for my chart - this might be an easier way for him to regularly communicate with us - thanks, DP

## 2018-05-01 NOTE — TELEPHONE ENCOUNTER
Patient notified and will talk it over with his wife and make a decision. He thinks he may just buy one but he will let us know what he decides. /GIUSEPPE Arzate

## 2018-05-01 NOTE — TELEPHONE ENCOUNTER
Phone call to patient regarding message below. His wife states she can help him get set up for Mychart. She states he is very sick he had a fever of 100.1 last night and 100.0 today. Patient is getting plenty of rest and staying hydrated. She states that his blood sugar this morning was 210 and the patient has not really been eating. It was explained with him being sick she should check his sugars more frequently. Nurse also advised patient to come in to be seen if his symptoms worsen or continues to have a fever. Wife verbalizes understanding. /GIUSEPPE Arzate  Routed to Dr. Lovett

## 2018-05-01 NOTE — TELEPHONE ENCOUNTER
[facility] will not fill the rx for bp cuff and she is not familiar with any medical supply company's in their area. She states they will purchase on over the counter and wonders if Dr Lovett has any recommendations on a particular brand. She states she will also ask the pharmacist as well. /GIUSEPPE Arzate  Routed to Dr. Lovett

## 2018-05-22 ENCOUNTER — TRANSFERRED RECORDS (OUTPATIENT)
Dept: HEALTH INFORMATION MANAGEMENT | Facility: CLINIC | Age: 78
End: 2018-05-22

## 2018-09-05 ENCOUNTER — TELEPHONE (OUTPATIENT)
Dept: FAMILY MEDICINE | Facility: CLINIC | Age: 78
End: 2018-09-05

## 2018-09-05 DIAGNOSIS — N18.30 TYPE 2 DIABETES MELLITUS WITH STAGE 3 CHRONIC KIDNEY DISEASE, WITH LONG-TERM CURRENT USE OF INSULIN (H): Primary | ICD-10-CM

## 2018-09-05 DIAGNOSIS — E11.22 TYPE 2 DIABETES MELLITUS WITH STAGE 3 CHRONIC KIDNEY DISEASE, WITH LONG-TERM CURRENT USE OF INSULIN (H): Primary | ICD-10-CM

## 2018-09-05 DIAGNOSIS — Z79.4 TYPE 2 DIABETES MELLITUS WITH STAGE 3 CHRONIC KIDNEY DISEASE, WITH LONG-TERM CURRENT USE OF INSULIN (H): Primary | ICD-10-CM

## 2018-09-05 DIAGNOSIS — N18.30 CHRONIC KIDNEY DISEASE, STAGE III (MODERATE) (H): ICD-10-CM

## 2018-09-05 NOTE — TELEPHONE ENCOUNTER
[facility] phone call message- general phone call:    Reason for call: He is scheduled for an appointment with  on [id number] of this month and he wants to know if  can put in lab orders for him for thw day prior so he can go over the results when he comes in on the 13th.    Return call needed: Yes    OK to leave a message on voice mail? Yes    Primary language: English      needed? No    Call taken on September 5, 2018 at 10:44 AM by Maisha Hardin

## 2018-09-05 NOTE — TELEPHONE ENCOUNTER
Routed to Dr. Lovett. /[facility] [place]      Done - he can have them drawn any time between now and the day before - thanks

## 2018-09-11 DIAGNOSIS — E11.22 TYPE 2 DIABETES MELLITUS WITH STAGE 3 CHRONIC KIDNEY DISEASE, WITH LONG-TERM CURRENT USE OF INSULIN (H): ICD-10-CM

## 2018-09-11 DIAGNOSIS — N18.30 CHRONIC KIDNEY DISEASE, STAGE III (MODERATE) (H): Primary | ICD-10-CM

## 2018-09-11 DIAGNOSIS — N18.30 TYPE 2 DIABETES MELLITUS WITH STAGE 3 CHRONIC KIDNEY DISEASE, WITH LONG-TERM CURRENT USE OF INSULIN (H): ICD-10-CM

## 2018-09-11 DIAGNOSIS — E11.22 TYPE 2 DIABETES MELLITUS WITH DIABETIC CHRONIC KIDNEY DISEASE (H): ICD-10-CM

## 2018-09-11 DIAGNOSIS — Z79.4 TYPE 2 DIABETES MELLITUS WITH STAGE 3 CHRONIC KIDNEY DISEASE, WITH LONG-TERM CURRENT USE OF INSULIN (H): ICD-10-CM

## 2018-09-11 LAB
ANION GAP SERPL CALCULATED.3IONS-SCNC: 9 MMOL/L (ref 5–18)
BUN SERPL-MCNC: 36 MG/DL (ref 8–28)
CALCIUM SERPL-MCNC: 9.6 MG/DL (ref 8.5–10.5)
CHLORIDE SERPL-SCNC: 106 MMOL/L (ref 98–107)
CHOLEST SERPL-MCNC: 165 MG/DL
CO2 SERPL-SCNC: 27 MMOL/L (ref 22–31)
CREAT SERPL-MCNC: 1.79 MG/DL (ref 0.7–1.3)
CREAT UR-MCNC: 117.9 MG/DL
FASTING?: NORMAL
GLUCOSE SERPL-MCNC: 138 MG/DL (ref 70–125)
HBA1C MFR BLD: 8.9 % (ref 4.1–5.7)
HCT VFR BLD AUTO: 38.8 % (ref 40–53)
HDLC SERPL-MCNC: 43 MG/DL
HEMOGLOBIN: 12.1 G/DL (ref 13.3–17.7)
LDLC SERPL CALC-MCNC: 95 MG/DL
MAGNESIUM SERPL-MCNC: 1.9 MG/DL (ref 1.8–2.6)
MCH RBC QN AUTO: 30.6 PG (ref 26.5–35)
MCHC RBC AUTO-ENTMCNC: 31.2 G/DL (ref 32–36)
MCV RBC AUTO: 97.9 FL (ref 78–100)
MICROALBUMIN UR-MCNC: 49.81 MG/DL (ref 0–1.99)
MICROALBUMIN/CREAT UR: 422.5 MG/G
PLATELET # BLD AUTO: 306 K/UL (ref 150–450)
POTASSIUM SERPL-SCNC: 5.2 MMOL/L (ref 3.5–5)
RBC # BLD AUTO: 4 M/UL (ref 4.4–5.9)
SODIUM SERPL-SCNC: 142 MMOL/L (ref 136–145)
TRIGL SERPL-MCNC: 133 MG/DL
TSH SERPL DL<=0.05 MIU/L-ACNC: 3.74 UIU/ML (ref 0.3–5)
WBC # BLD AUTO: 5.8 K/UL (ref 4–11)

## 2018-09-13 ENCOUNTER — OFFICE VISIT (OUTPATIENT)
Dept: FAMILY MEDICINE | Facility: CLINIC | Age: 78
End: 2018-09-13
Payer: COMMERCIAL

## 2018-09-13 VITALS
DIASTOLIC BLOOD PRESSURE: 81 MMHG | WEIGHT: 225 LBS | BODY MASS INDEX: 31.83 KG/M2 | SYSTOLIC BLOOD PRESSURE: 157 MMHG | TEMPERATURE: 98.2 F | HEART RATE: 69 BPM | RESPIRATION RATE: 12 BRPM | OXYGEN SATURATION: 96 %

## 2018-09-13 DIAGNOSIS — N18.30 CHRONIC KIDNEY DISEASE, STAGE III (MODERATE) (H): Primary | ICD-10-CM

## 2018-09-13 DIAGNOSIS — Z79.4 TYPE 2 DIABETES MELLITUS WITH STAGE 3 CHRONIC KIDNEY DISEASE, WITH LONG-TERM CURRENT USE OF INSULIN (H): ICD-10-CM

## 2018-09-13 DIAGNOSIS — G25.0 BENIGN ESSENTIAL TREMOR: ICD-10-CM

## 2018-09-13 DIAGNOSIS — E11.22 TYPE 2 DIABETES MELLITUS WITH STAGE 3 CHRONIC KIDNEY DISEASE, WITH LONG-TERM CURRENT USE OF INSULIN (H): ICD-10-CM

## 2018-09-13 DIAGNOSIS — N18.30 TYPE 2 DIABETES MELLITUS WITH STAGE 3 CHRONIC KIDNEY DISEASE, WITH LONG-TERM CURRENT USE OF INSULIN (H): ICD-10-CM

## 2018-09-13 DIAGNOSIS — R80.1 PERSISTENT PROTEINURIA: ICD-10-CM

## 2018-09-13 DIAGNOSIS — I10 ESSENTIAL HYPERTENSION: ICD-10-CM

## 2018-09-13 NOTE — PROGRESS NOTES
"Medication Management Note                                                       Senthil was referred by Dr. Lovett for pharmacy services for medication management.    MEDICATION REVIEW:  Discussed all medication indications, dosage and effectiveness, adverse effects, and adherence with patient/caregiver.    Pt had meds with them: no  Pt had med list with them: no  Pt was knowledgeable about meds: yes  Medications set up by: self and wife  Medications administered by someone else (e.g., LTCF): Yes: wife helps to administer insulin injections  Pt uses a medication box or automated dispenser: no  Called pharmacy to obtain or clarify med list:  no  Called HHN or [facility] to obtain or clarify med list:  no    Medication Discrepancies  Medications on EMR med list that pt is NOT taking:  none  Medications pt IS taking that are NOT on EMR med list (e.g., from specialist, hospital): none  OTC meds/ dietary supplements pt taking on own that are NOT on EMR med list:  none  Dosage listed differently than how patient is taking: yes, pt taking primidone 50 mg once daily as needed during the day  Frequency listed differently than how patient is taking: none  Duplicate medication on list (two occurrences of the same medication):  none  TOTAL NUMBER OF MEDICATION DISCREPANCIES:  1    Subjective                                                       Patient reports the following problems or concerns with their medications:  None  Patient reports the following adverse reactions to medications:  Yes - experiences \"bad dreams\" with taking primidone at bedtime and occasionally experiences hypoglycemia with current anti-diabetic meds  Pt reports missing doses:  2 to 3 times per week with aspirin  Additional subjective information (e.g., reason for visit, frequency of PRNs, reasons meds were D/C ed):    Pt currently not willing to change diabetic meds due to concerns with burning out his pancreas and potential for increased hypoglycemia " "events    Pt states he cannot tolerate the symptoms associated with hypoglycemia, especially with already having a baseline tremor    Wife states pt is unaware of becoming hypoglycemic until he can \"barely talk\" and looks like he is going to faint    Pt is willing to discuss possible therapy changes at a future appointment    Pt has decided to take primidone as needed during the day for tremor rather than before bedtime due to adverse side effects of having nightmares    Takes 1 tablet (50 mg) as needed for tremor episodes    Tremor does not bother him at nighttime while asleep    Pt states he occasionally misses aspirin doses when he feels he doesn't need it    Objective                                                       Patient Active Problem List   Diagnosis     Chronic kidney disease, stage III (moderate)     Coronary atherosclerosis     Pure hypercholesterolemia     Essential hypertension     Proteinuria     Calculus of kidney     Inguinal hernia     Premature beats     History of colonic polyps     Type 2 diabetes mellitus with diabetic chronic kidney disease (H)     Benign essential tremor       Current Outpatient Prescriptions   Medication Sig Dispense Refill     aspirin 325 MG tablet Take 325 mg by mouth every other day       atorvastatin (LIPITOR) 40 MG tablet Take 1 tablet (40 mg) by mouth daily 90 tablet 3     blood glucose monitoring (ONE TOUCH ULTRA) test strip Use up to 3 times daily 100 strip 11     Blood Pressure Monitoring (SPHYGMOMANOMETER) MISC 1 Device daily as needed 1 each 0     insulin NPH-insulin regular MIX (NOVOLIN MIX VIAL) (70-30) 100 UNIT/ML Take 20 u morning and 30 u evening       insulin pen needle (BD ULTRA-FINE) 29G X 12.7MM Use twice daily 180 each 3     lisinopril (PRINIVIL/ZESTRIL) 40 MG tablet Take 1 tablet (40 mg) by mouth 2 times daily 180 tablet 3     metFORMIN (GLUCOPHAGE) 1000 MG tablet Take 1 tablet (1,000 mg) by mouth 2 times daily (with meals) 180 tablet 3     " metoprolol succinate (TOPROL-XL) 100 MG 24 hr tablet Take 2 tablets (200 mg) by mouth daily 180 tablet 3     primidone (MYSOLINE) 50 MG tablet Take 1/2 tablet (25 mg) at bedtime for 1 week, 1 tablet at bedtime for 1 week, 1 and 1/2 tablet at bedtime for 1 week, then 2 tablets at bedtime 62 tablet 11       Social History   Substance Use Topics     Smoking status: Never Smoker     Smokeless tobacco: Never Used     Alcohol use Yes      Comment: beer once in a while       CrCl cannot be calculated (Unknown ideal weight.).    Lab Results   Component Value Date    A1C 8.9 09/11/2018    A1C 9.4 04/10/2018    A1C 8.5 08/10/2017    A1C 8.1 04/24/2017    A1C 9.9 01/19/2017     Last Comprehensive Metabolic Panel:  Sodium   Date Value Ref Range Status   09/11/2018 142 136 - 145 mmol/L Final     Potassium   Date Value Ref Range Status   09/11/2018 5.2 (H) 3.5 - 5.0 mmol/L Final     Chloride   Date Value Ref Range Status   09/11/2018 106 98 - 107 mmol/L Final     Carbon Dioxide   Date Value Ref Range Status   04/10/2018 28.3 20.0 - 32.0 mmol/L Final     Glucose   Date Value Ref Range Status   09/11/2018 138 (H) 70 - 125 mg/dL Final     Urea Nitrogen   Date Value Ref Range Status   09/11/2018 36 (H) 8 - 28 mg/dL Final     Creatinine   Date Value Ref Range Status   09/11/2018 1.79 (H) 0.70 - 1.30 mg/dL Final     GFR Estimate   Date Value Ref Range Status   09/11/2018 37 (L) >60 mL/min/1.73m2 Final     Calcium   Date Value Ref Range Status   09/11/2018 9.6 8.5 - 10.5 mg/dL Final       BP Readings from Last 3 Encounters:   09/13/18 157/81   04/16/18 195/82   08/10/17 137/83       The 10-year ASCVD risk score (Damian ARAGON Jr, et al., 2013) is: 62.7%    Values used to calculate the score:      Age: 78 years      Sex: Male      Is Non- : No      Diabetic: Yes      Tobacco smoker: No      Systolic Blood Pressure: 157 mmHg      Is BP treated: Yes      HDL Cholesterol: 43 mg/dL      Total Cholesterol: 165 mg/dL    PHQ-9  score:  No flowsheet data found.            Assessment                                                       Type 2 Diabetes Mellitus -  uncontrolled     Current A1C above goal at 8.9    Pt not willing to change diabetic therapies at this time    CKD stage 3    Pt's metformin dose may need to be renally adjusted - pt not willing to make changes at this time    Hypertension -  uncontrolled     Current BP of 157/81 above goal (<140/90)    Pt not willing to change antihypertensive therapies at this time    Tremor -  controlled     Pt takes primidone 50 mg during the day as needed to control tremor    Plan/Recommendations                                                       Updated medication list in the EMR; deleted meds patient no longer taking and added meds patient is now taking, and changed doses where there was a dose discrepancy.    All medications were reviewed and found to be indicated, effective, safe and convenient/ affordable unless drug therapy problem(s) was/were identified, as are described below.      Completed at this visit    Type 2 Diabetes Mellitus    Discussed potential long-term harm of continued hyperglycemic levels    Assessed pt's willingness to make changes to diabetic therapies    Discussed use of once weekly Trulicity for better glycemic control    Discussed dose reductions of mixed insulin therapy and metformin      To be completed at a future visit  Type 2 Diabetes Mellitus    Follow-up with pt's willingness to make changes to therapies    Assess improvement/decline of glycemic control    CKD stage 3    Address renal dose adjustment to metformin    Hypertension    Assess BP control for potential med changes    Options for treatment and/or follow-up care were reviewed with the patient.  Senthil was engaged and actively involved in the decision making process, verbalized understanding of the options discussed, and was satisfied with the final plan.    Follow-up                                                        Patient was provided with written instructions/medication list via AVS.     Dr. Lovett was provided the recommendations above  in clinic today and Dr. Jurado was available for supervision during this visit and is the authorizing prescriber for this visit through the pharmacist collaborative practice agreement.    Sharona Wright, DeniseD Student      Drug therapy problems identified  1. Med: metformin - Safety - dose too high - Resolution: Change dose; deferred to future visit  2. Med: Trulicity - Indication - Needs additional drug therapy - Resolution: Intiate Drug; deferred to future visit  3. Med: Amlodipine - Indication - Needs additional drug therapy - Resolution: Initiate Drug; deferred to future visit    # of medical conditions addressed: 4  # of medications addressed: 10  # of medication discrepancies identified: 1  # of DTP identified: 3  Time spent: 20 minutes  Level of service: 4 nc    I was present with the pharmacy student who participated in the service and in the documentation of this note. I have verified the history, personally performed the medical decision making, and have verified the content of the note, which accurately reflects my assessment of the patient and the plan of care.   Evangelina Medina, DeniseD

## 2018-09-13 NOTE — PROGRESS NOTES
Jose Guadalupe Ndiaye, we'll discuss these during the visit - overall labs are pretty good.  You are losing less protein in your urine - this is good.  A1c is better - good!, but still above target of under 8.0 (see last 2 years values below).  Also kidney function has declined a little since last labs (that's a slight increase in creatinine and slight decrease in GFR).  Hemoglobin is stable - you do not need more Iron / EPO.    So, two things:  1. I know you hate changes - but could you consider adding in a single injection per week - Trulicity (advertized on TV) works very nicely, is just once a week and is covered by most insurances and may be enough to nudge that A1c down below 8.0.  PLEASE consider it!  2. It may be good to check in with a kidney doctor again - although I'm not sure what else they would do other than encourage you to get sugars down a bit more and control blood pressure.    Talk to you more in clinic!  Mo Lovett    Lab Results       Component                Value               Date                       A1C                      8.9                 09/11/2018                 A1C                      9.4                 04/10/2018                 A1C                      8.5                 08/10/2017                 A1C                      8.1                 04/24/2017                 A1C                      9.9                 01/19/2017

## 2018-09-13 NOTE — MR AVS SNAPSHOT
After Visit Summary   9/13/2018    Senthil Castillo    MRN: [id number]           Patient Information     Date Of Birth          1940        Visit Information        Provider Department      9/13/2018 8:40 AM Mo Lovett MD [facility]         Follow-ups after your visit        Who to contact     Please call your clinic at [phone] to:    Ask questions about your health    Make or cancel appointments    Discuss your medicines    Learn about your test results    Speak to your doctor            Additional Information About Your Visit        MyChart Information     [employer] gives you secure access to your electronic health record. If you see a primary care provider, you can also send messages to your care team and make appointments. If you have questions, please call your primary care clinic.  If you do not have a primary care provider, please call [phone] and they will assist you.      [employer] is an electronic gateway that provides easy, online access to your medical records. With [employer], you can request a clinic appointment, read your test results, renew a prescription or communicate with your care team.     To access your existing account, please contact your [facility] or call [phone] for assistance.        Care EveryWhere ID     This is your Care EveryWhere ID. This could be used by other organizations to access your [facility] medical records  [id number]        Your Vitals Were     Pulse Temperature Respirations Pulse Oximetry BMI (Body Mass Index)       69 98.2  F (36.8  C) (Oral) 12 96% 31.83 kg/m2        Blood Pressure from Last 3 Encounters:   09/13/18 157/81   04/16/18 195/82   08/10/17 137/83    Weight from Last 3 Encounters:   09/13/18 225 lb (102.1 kg)   04/16/18 231 lb (104.8 kg)   08/10/17 219 lb 9.6 oz (99.6 kg)              Today, you had the following     No orders found for display       Primary Care Provider Office Phone # Fax #     Mo Lovett [phone]       [address] [place] [address]        Equal Access to Services     MORELIA MARSH : Hadii aad ku hadwaqastonya Oliver, wazainada theresakarlaha, qamary louta kafarrukhda blancaginageorge, alan leroyin hayaajennifer sanchezorestes prabhagilesmerry rodriguez. So [facility] [phone].    ATENCIÓN: Si habla español, tiene a egan disposición servicios gratuitos de asistencia lingüística. Llame al [phone].    We comply with applicable federal civil rights laws and [place] laws. We do not discriminate on the basis of race, color, national origin, age, disability, sex, sexual orientation, or gender identity.            Thank you!     Thank you for choosing [facility]  for your care. Our goal is always to provide you with excellent care. Hearing back from our patients is one way we can continue to improve our services. Please take a few minutes to complete the written survey that you may receive in the mail after your visit with us. Thank you!             Your Updated Medication List - Protect others around you: Learn how to safely use, store and throw away your medicines at www.disposemymeds.org.          This list is accurate as of 9/13/18  9:18 AM.  Always use your most recent med list.                   Brand Name Dispense Instructions for use Diagnosis    aspirin 325 MG tablet      Take 325 mg by mouth every other day        atorvastatin 40 MG tablet    LIPITOR    90 tablet    Take 1 tablet (40 mg) by mouth daily    Chronic kidney disease, stage III (moderate)       blood glucose monitoring test strip    ONETOUCH ULTRA    100 strip    Use up to 3 times daily    Type 2 diabetes mellitus with stage 3 chronic kidney disease (H)       insulin pen needle 29G X 12.7MM    BD ULTRA-FINE    180 each    Use twice daily    Type 2 diabetes mellitus with diabetic chronic kidney disease (H)       lisinopril 40 MG tablet    PRINIVIL/ZESTRIL    180 tablet    Take 1 tablet (40 mg) by mouth 2 times daily    Persistent proteinuria,  Chronic kidney disease, stage III (moderate)       metFORMIN 1000 MG tablet    GLUCOPHAGE    180 tablet    Take 1 tablet (1,000 mg) by mouth 2 times daily (with meals)    Type 2 diabetes mellitus with hyperglycemia, with long-term current use of insulin (H)       metoprolol succinate 100 MG 24 hr tablet    TOPROL-XL    180 tablet    Take 2 tablets (200 mg) by mouth daily    Renal hypertension       NovoLIN MIX 70/30 VIAL injection   Generic drug:  insulin NPH-insulin regular      Take 20 u morning and 30 u evening    Type II or unspecified type diabetes mellitus without mention of complication, not stated as uncontrolled       primidone 50 MG tablet    MYSOLINE    62 tablet    Take 1/2 tablet (25 mg) at bedtime for 1 week, 1 tablet at bedtime for 1 week, 1 and 1/2 tablet at bedtime for 1 week, then 2 tablets at bedtime    Essential and other specified forms of tremor       Sphygmomanometer Misc     1 each    1 Device daily as needed    Renal hypertension

## 2018-09-13 NOTE — PROGRESS NOTES
Senthil is a 78-year-old male who is very well known to me.  He attends for a routine for monthly diabetes check having last been seen in April 2018.  He reports that he is doing okay.  He is very intolerant of hypoglycemia and prefers that his sugars run high rather than risk low readings.  He has remained on the same split dose 7030 insulin for many years which is not the ideal regimen for him given that he will often have many hours with no oral intake however discussion about changing has been unproductive many times over the years.    He does notice that his tremor gets in the way of maintaining a motorcycle which he likes to do as a hobby but he is satisfied enough not to visit with neurology right now.  He does tell me a brother-in-law was recently diagnosed with Parkinson's.    He has just had his eyes checked.  He does not have any numbness or tingling of his feet.    Objective:  /81 (BP Location: Left arm, Patient Position: Sitting, Cuff Size: Adult Large)  Pulse 69  Temp 98.2  F (36.8  C) (Oral)  Resp 12  Wt 225 lb (102.1 kg)  SpO2 96%  BMI 31.83 kg/m2   He is lost a few pounds of weight and congratulated for this.  His blood pressure is mildly elevated however he tells me that it is typically 130/70 when he checks it in stores are at home.  His pulse is regular.  He has a tremor however he has no rigidity with movement of his forearm.  His feet are examined.  He has good dorsalis pedis pulses bilaterally, he has evidence of hair loss on both feet.  Microfilament testing of both feet is negative for neuropathy.  He has no corns or ulcers.    Results for orders placed or performed in visit on 09/11/18   Hemoglobin A1c ([facility])   Result Value Ref Range    Hemoglobin A1C 8.9 (H) 4.1 - 5.7 %   Thyroid [place] ([facility])   Result Value Ref Range    TSH 3.74 0.30 - 5.00 uIU/mL    Narrative    Test performed by:  [facility]  [address] [place] [address]   Magnesium [facility]    Result Value Ref Range    Magnesium 1.9 1.8 - 2.6 mg/dL    Narrative    Test performed by:  [facility]  [address] [place] [address]   CBC with Plt ([facility])   Result Value Ref Range    WBC 5.8 4.0 - 11.0 K/uL    RBC 4.0 (L) 4.4 - 5.9 M/uL    Hemoglobin 12.1 (L) 13.3 - 17.7 g/dL    Hematocrit 38.8 (L) 40.0 - 53.0 %    MCV 97.9 78.0 - 100.0 fL    MCH 30.6 26.5 - 35.0    MCHC 31.2 (L) 32.0 - 36.0 g/dL    Platelets 306.0 150.0 - 450.0 K/uL   Microalbumin Random Ur ([facility])   Result Value Ref Range    Microalbumin, Urine 49.81 (H) 0.00 - 1.99 mg/dL    Creatinine, Urine 117.9 mg/dL    Albumin Urine mg/g Cr 422.5 (H) <=19.9 mg/g    Narrative    Test performed by:  [facility]  [address] [place] [address]  Microalbumin, Random Urine  <2.0 mg/dL . . . . . . . . Normal  3.0-30.0 mg/dL . . . . . . Microalbuminuria  >30.0 mg/dL . . . . . .  . Clinical Proteinuria  Microalbumin/Creatinine Ratio, Random Urine  <20 mg/g . . . . .. . . . Normal   mg/g . . . . . . . Microalbuminuria  >300 mg/g . . . . . . . . Clinical Proteinuria   Lipid [place] ([facility]) - Results > 1 hr   Result Value Ref Range    Cholesterol 165 <=199 mg/dL    Triglycerides 133 <=149 mg/dL    HDL Cholesterol 43 >=40 mg/dL    LDL Cholesterol Calculated 95 <=129 mg/dL    Fasting? Unknown     Narrative    Test performed by:  [facility]  [address] [place] [address]   Basic Metabolic Profile ([facility])   Result Value Ref Range    Sodium 142 136 - 145 mmol/L    Potassium 5.2 (H) 3.5 - 5.0 mmol/L    Chloride 106 98 - 107 mmol/L    CO2, Total 27 22 - 31 mmol/L    Anion Gap 9 5 - 18 mmol/L    Glucose 138 (H) 70 - 125 mg/dL    Calcium 9.6 8.5 - 10.5 mg/dL    Urea Nitrogen 36 (H) 8 - 28 mg/dL    Creatinine 1.79 (H) 0.70 - 1.30 mg/dL    GFR Estimate If Black 45 (L) >60 mL/min/1.73m2    GFR Estimate 37 (L) >60 mL/min/1.73m2    Narrative    Test performed by:  [address] [place] [address]  Fasting Glucose reference range is 70-99 mg/dL per  American Diabetes Association (ADA) guidelines.       Senthil was seen today for diabetes and pain.    Diagnoses and all orders for this visit:    Chronic kidney disease, stage III (moderate)    Essential hypertension    Persistent proteinuria    Type 2 diabetes mellitus with stage 3 chronic kidney disease, with long-term current use of insulin (H)    Benign essential tremor      I advised Senthil that he has had a decline in his GFR such that he could visit with nephrology.  He would prefer to wait until labs are rechecked in 3-4 months before pursuing this.  I explained that if his GFR continues to decline his metformin dosing will need to be adjusted.    In addition given his inability to adequately control his blood sugars by A1c for over 2 years now I strongly recommended he allow us to add in an adjuvant hypoglycemic agent.  He consented to visit with our pharmacist to discuss adding in weekly bydureon however at the end of that visit I am told that he declined to commence a new agent.  He continues to take a insulin combination that is not well suited to his lifestyle but declines to change this.    Concerning his elevated BP today because his BP is usually well controlled at home he prefers not to make any medication adjustments.  We will therefore continue with his current regimens for now and plan to visit with him again in 3-4 months time.    Total visit time was 25 mins, all of which was face to face MD time, and over 50% of this time was spent in counseling and coordination of care.

## 2018-10-17 DIAGNOSIS — N18.30 TYPE 2 DIABETES MELLITUS WITH STAGE 3 CHRONIC KIDNEY DISEASE, WITH LONG-TERM CURRENT USE OF INSULIN (H): Primary | ICD-10-CM

## 2018-10-17 DIAGNOSIS — Z79.4 TYPE 2 DIABETES MELLITUS WITH STAGE 3 CHRONIC KIDNEY DISEASE, WITH LONG-TERM CURRENT USE OF INSULIN (H): Primary | ICD-10-CM

## 2018-10-17 DIAGNOSIS — E11.22 TYPE 2 DIABETES MELLITUS WITH STAGE 3 CHRONIC KIDNEY DISEASE (H): ICD-10-CM

## 2018-10-17 DIAGNOSIS — E11.22 TYPE 2 DIABETES MELLITUS WITH STAGE 3 CHRONIC KIDNEY DISEASE, WITH LONG-TERM CURRENT USE OF INSULIN (H): Primary | ICD-10-CM

## 2018-10-17 DIAGNOSIS — N18.30 TYPE 2 DIABETES MELLITUS WITH STAGE 3 CHRONIC KIDNEY DISEASE (H): ICD-10-CM

## 2018-12-13 DIAGNOSIS — Z79.4 TYPE 2 DIABETES MELLITUS WITH STAGE 3 CHRONIC KIDNEY DISEASE, WITH LONG-TERM CURRENT USE OF INSULIN (H): ICD-10-CM

## 2018-12-13 DIAGNOSIS — E11.22 TYPE 2 DIABETES MELLITUS WITH STAGE 3 CHRONIC KIDNEY DISEASE, WITH LONG-TERM CURRENT USE OF INSULIN (H): ICD-10-CM

## 2018-12-13 DIAGNOSIS — N18.30 TYPE 2 DIABETES MELLITUS WITH STAGE 3 CHRONIC KIDNEY DISEASE, WITH LONG-TERM CURRENT USE OF INSULIN (H): ICD-10-CM

## 2018-12-13 DIAGNOSIS — N18.30 CHRONIC KIDNEY DISEASE, STAGE III (MODERATE) (H): ICD-10-CM

## 2018-12-13 LAB
BUN SERPL-MCNC: 24.9 MG/DL (ref 7–21)
CALCIUM SERPL-MCNC: 9.1 MG/DL (ref 8.5–10.1)
CHLORIDE SERPLBLD-SCNC: 107.5 MMOL/L (ref 98–110)
CHOLEST SERPL-MCNC: 141.6 MG/DL (ref 0–200)
CHOLEST/HDLC SERPL: 3.3 {RATIO} (ref 0–5)
CO2 SERPL-SCNC: 31.4 MMOL/L (ref 20–32)
CREAT SERPL-MCNC: 1.4 MG/DL (ref 0.7–1.3)
GFR SERPL CREATININE-BSD FRML MDRD: 52.4 ML/MIN/1.7 M2
GLUCOSE SERPL-MCNC: 157.7 MG'DL (ref 70–99)
HDLC SERPL-MCNC: 43.3 MG/DL
LDLC SERPL CALC-MCNC: 77 MG/DL (ref 0–129)
POTASSIUM SERPL-SCNC: 5.2 MMOL/DL (ref 3.2–4.6)
SODIUM SERPL-SCNC: 144.5 MMOL/L (ref 132–142)
TRIGL SERPL-MCNC: 104.2 MG/DL (ref 0–150)
VLDL CHOLESTEROL: 20.8 MG/DL (ref 7–32)

## 2018-12-13 NOTE — RESULT ENCOUNTER NOTE
Jose Guadalupe Ndiaye - good news is that your kidney function has improved - as you can see below it was better today than it was the last 2 times we checked - that is excellent!  Your cholesterol panel was also excellent today.  Hope things are going well with the blood sugars?  Thinking I will see you in the New Year?  Take care!  Mo Lovett    GFR Estimate       Date                     Value               Ref Range           Status                12/13/2018               52.4 (L)            >60.0 mL/min/1*     Final                 09/11/2018               37 (L)              >60 mL/min/1.7*     Final                 04/10/2018               45.0 (L)            >60.0 mL/min/1*     Final                 08/10/2017               52.7 (L)            >60.0 mL/min/1*     Final                 04/24/2017               57.4 (L)            >60.0 mL/min/1*     Final            ----------

## 2018-12-13 NOTE — LETTER
December 13, 2018      Senthil Castillo  [address]  [place] [address]        Dear Senthil,    Good news is that your kidney function has improved - as you can see below it was better today than it was the last 2 times we checked - that is excellent!  Your cholesterol panel was also excellent today.  Hope things are going well with the blood sugars?  Thinking I will see you in the New Year?  Take care!     GFR Estimate        Date                     Value               Ref Range           Status                12/13/2018               52.4 (L)            >60.0 mL/min/1*     Final                  09/11/2018               37 (L)              >60 mL/min/1.7*     Final                  04/10/2018               45.0 (L)            >60.0 mL/min/1*     Final                  08/10/2017               52.7 (L)            >60.0 mL/min/1*     Final                  04/24/2017               57.4 (L)            >60.0 mL/min/1*     Final               Please see below for your test results.    Resulted Orders   Lipid Panel ([facility])   Result Value Ref Range    Cholesterol 141.6 0.0 - 200.0 mg/dL    Cholesterol/HDL Ratio 3.3 0.0 - 5.0    HDL Cholesterol 43.3 >40.0 mg/dL    LDL Cholesterol Calculated 77 0 - 129 mg/dL    Triglycerides 104.2 0.0 - 150.0 mg/dL    VLDL Cholesterol 20.8 7.0 - 32.0 mg/dL   Basic Metabolic Panel ([facility])   Result Value Ref Range    Urea Nitrogen 24.9 (H) 7.0 - 21.0 mg/dL    Calcium 9.1 8.5 - 10.1 mg/dL    Chloride 107.5 98.0 - 110.0 mmol/L    Carbon Dioxide 31.4 20.0 - 32.0 mmol/L    Creatinine 1.4 (H) 0.7 - 1.3 mg/dL    Glucose 157.7 (H) 70.0 - 99.0 mg'dL    Potassium 5.2 (H) 3.2 - 4.6 mmol/dL    Sodium 144.5 (H) 132.0 - 142.0 mmol/L    GFR Estimate 52.4 (L) >60.0 mL/min/1.7 m2    GFR Estimate If Black 63.4 >60.0 mL/min/1.7 m2       If you have any questions, please call the clinic to make an appointment.    Sincerely,    Mo Lovett MD

## 2018-12-14 ENCOUNTER — OFFICE VISIT (OUTPATIENT)
Dept: FAMILY MEDICINE | Facility: CLINIC | Age: 78
End: 2018-12-14
Payer: COMMERCIAL

## 2018-12-14 VITALS
BODY MASS INDEX: 31.3 KG/M2 | DIASTOLIC BLOOD PRESSURE: 82 MMHG | OXYGEN SATURATION: 98 % | SYSTOLIC BLOOD PRESSURE: 168 MMHG | TEMPERATURE: 98.2 F | RESPIRATION RATE: 12 BRPM | WEIGHT: 223.6 LBS | HEIGHT: 71 IN | HEART RATE: 71 BPM

## 2018-12-14 DIAGNOSIS — R25.1 TREMOR: ICD-10-CM

## 2018-12-14 DIAGNOSIS — Z79.4 TYPE 2 DIABETES MELLITUS WITH HYPERGLYCEMIA, WITH LONG-TERM CURRENT USE OF INSULIN (H): ICD-10-CM

## 2018-12-14 DIAGNOSIS — R80.1 PERSISTENT PROTEINURIA: ICD-10-CM

## 2018-12-14 DIAGNOSIS — Z79.4 TYPE 2 DIABETES MELLITUS WITH STAGE 3 CHRONIC KIDNEY DISEASE, WITH LONG-TERM CURRENT USE OF INSULIN (H): ICD-10-CM

## 2018-12-14 DIAGNOSIS — E11.65 TYPE 2 DIABETES MELLITUS WITH HYPERGLYCEMIA, WITH LONG-TERM CURRENT USE OF INSULIN (H): ICD-10-CM

## 2018-12-14 DIAGNOSIS — N18.30 CHRONIC KIDNEY DISEASE, STAGE III (MODERATE) (H): ICD-10-CM

## 2018-12-14 DIAGNOSIS — N18.30 TYPE 2 DIABETES MELLITUS WITH STAGE 3 CHRONIC KIDNEY DISEASE, WITH LONG-TERM CURRENT USE OF INSULIN (H): ICD-10-CM

## 2018-12-14 DIAGNOSIS — E11.22 TYPE 2 DIABETES MELLITUS WITH STAGE 3 CHRONIC KIDNEY DISEASE, WITH LONG-TERM CURRENT USE OF INSULIN (H): ICD-10-CM

## 2018-12-14 DIAGNOSIS — I12.9 RENAL HYPERTENSION: ICD-10-CM

## 2018-12-14 LAB
HBA1C MFR BLD: 9.4 % (ref 4.1–5.7)
HCT VFR BLD AUTO: 41.7 % (ref 40–53)
HEMOGLOBIN: 12.6 G/DL (ref 13.3–17.7)
MCH RBC QN AUTO: 29.4 PG (ref 26.5–35)
MCHC RBC AUTO-ENTMCNC: 30.2 G/DL (ref 32–36)
MCV RBC AUTO: 97.4 FL (ref 78–100)
PLATELET # BLD AUTO: 340 K/UL (ref 150–450)
RBC # BLD AUTO: 4.3 M/UL (ref 4.4–5.9)
T4 FREE SERPL-MCNC: 0.9 NG/DL (ref 0.7–1.8)
TSH SERPL DL<=0.05 MIU/L-ACNC: 5.62 UIU/ML (ref 0.3–5)
WBC # BLD AUTO: 5.5 K/UL (ref 4–11)

## 2018-12-14 RX ORDER — ATORVASTATIN CALCIUM 40 MG/1
40 TABLET, FILM COATED ORAL DAILY
Qty: 90 TABLET | Refills: 3 | Status: SHIPPED | OUTPATIENT
Start: 2018-12-14 | End: 2019-11-07

## 2018-12-14 RX ORDER — ASPIRIN 325 MG
325 TABLET ORAL EVERY OTHER DAY
Qty: 90 TABLET | Refills: 3 | Status: SHIPPED | OUTPATIENT
Start: 2018-12-14 | End: 2019-11-07

## 2018-12-14 RX ORDER — METOPROLOL SUCCINATE 100 MG/1
200 TABLET, EXTENDED RELEASE ORAL DAILY
Qty: 180 TABLET | Refills: 3 | Status: SHIPPED | OUTPATIENT
Start: 2018-12-14 | End: 2019-05-20

## 2018-12-14 RX ORDER — PRIMIDONE 50 MG/1
TABLET ORAL
Qty: 62 TABLET | Refills: 11 | Status: SHIPPED | OUTPATIENT
Start: 2018-12-14 | End: 2019-11-07

## 2018-12-14 RX ORDER — LISINOPRIL 40 MG/1
40 TABLET ORAL
Qty: 180 TABLET | Refills: 3 | Status: SHIPPED | OUTPATIENT
Start: 2018-12-14 | End: 2019-11-07

## 2018-12-14 ASSESSMENT — MIFFLIN-ST. JEOR: SCORE: 1766.37

## 2018-12-14 NOTE — LETTER
December 17, 2018      Senthil Castillo  [address]  [place] [address]        Dear Kar,    Your hemoglobin is stable (good) but A1C is higher than it should be - again, we would like it under 8.0 (or approximately average of 150).  Let me know if you want to see a neurologist about the tremors.  Take care.   Please see below for your test results.    Resulted Orders   Hemoglobin A1c ([facility])   Result Value Ref Range    Hemoglobin A1C 9.4 (H) 4.1 - 5.7 %   CBC with Plt ([facility])   Result Value Ref Range    WBC 5.5 4.0 - 11.0 K/uL    RBC 4.3 (L) 4.4 - 5.9 M/uL    Hemoglobin 12.6 (L) 13.3 - 17.7 g/dL    Hematocrit 41.7 40.0 - 53.0 %    MCV 97.4 78.0 - 100.0 fL    MCH 29.4 26.5 - 35.0    MCHC 30.2 (L) 32.0 - 36.0 g/dL    Platelets 340.0 150.0 - 450.0 K/uL       If you have any questions, please call the clinic to make an appointment.    Sincerely,    Mo Lovett MD

## 2018-12-14 NOTE — PROGRESS NOTES
"Lab Results   Component Value Date    A1C 8.9 09/11/2018    A1C 9.4 04/10/2018    A1C 8.5 08/10/2017    A1C 8.1 04/24/2017    A1C 9.9 01/19/2017     This is a 78-year-old male who is well known to me.  He attends today to follow-up on a visit 3 months ago when there had been a decline in his kidney function associated with uncontrolled diabetes.  Today he is accompanied by his wife and they both acknowledge that he is very resistant to change and has not wanted to change his diabetes medications but instead try to better control his diet.  He does acknowledge that he has been drinking free water more often than in the past and hopes that this has improved his kidney function.    Otherwise he does notice that his tremor has worsened.  He likes to work on motorcycles or vehicles and finds that this is increasingly difficult.  He had previously taken primidone but stopped it due to nightmares.  He has restarted taking it during the day and does find that it seems to help his tremor and wants to know whether he should continue with this.  He does have a brother-in-law recently diagnosed with Parkinson's which raises his concern that is no longer benign essential tremor.  He does report that he moves quite well and does not have any difficulty moving from a seated to a walking position and can walk quickly if needed.    Objective:  /82 (BP Location: Left arm, Patient Position: Sitting, Cuff Size: Adult Large)   Pulse 71   Temp 98.2  F (36.8  C) (Oral)   Resp 12   Ht 1.803 m (5' 11\")   Wt 101.4 kg (223 lb 9.6 oz)   SpO2 98%   BMI 31.19 kg/m    His systolic BP is elevated but he puts this down to white coat hypertension and says that when he checks it and nonclinic locations to usually in the 130s over 80s.  He is in the mild obese category.  He does have a symmetric fine tremor of both hands.  Exam of his feet reveals that he does have some patches of loss of sensation on the soles especially the right foot " but no evidence of skin damage nor ulceration.  He does have reasonable foot pulses and 1 callus.    Senthil was seen today for diabetes, tremors and recheck medication.    Diagnoses and all orders for this visit:    Tremor  -     primidone (MYSOLINE) 50 MG tablet; Take 1 BID  -     Th[facility]roid [place] ([email or web address]    Renal hypertension  -     metoprolol succinate ER (TOPROL-XL) 100 MG 24 hr tablet; Take 2 tablets (200 mg) by mouth daily    Type 2 diabetes mellitus with hyperglycemia, with long-term current use of insulin (H)    Persistent proteinuria  -     lisinopril (PRINIVIL/ZESTRIL) 40 MG tablet; Take 1 tablet (40 mg) by mouth 2 times daily    Chronic kidney disease, stage III (moderate) (H)  -     lisinopril (PRINIVIL/ZESTRIL) 40 MG tablet; Take 1 tablet (40 mg) by mouth 2 times daily  -     atorvastatin (LIPITOR) 40 MG tablet; Take 1 tablet (40 mg) by mouth daily  -     CBC with Plt ([facility])    Type 2 diabetes mellitus with stage 3 chronic kidney disease, with long-term current use of insulin (H)  -     metFORMIN (GLUCOPHAGE) 1000 MG tablet; Take 1 tablet (1,000 mg) by mouth 2 times daily (with meals)  -     insulin pen needle (BD ULTRA-FINE) 29G X 12.7MM miscellaneous; Use twice daily  -     blood glucose monitoring (ONETOUCH ULTRA) test strip; Use up to 3 times daily  -     aspirin (ASA) 325 MG tablet; Take 1 tablet (325 mg) by mouth every other day  -     Hemoglobin A1c ([facility])      I reviewed recommended dosing of primidone and indicated that he could be taking much higher doses up to a total daily dose of 750 mg in divided doses.  He is hesitant to consider such high doses but agrees to taking 100 mg daily.  He does not appear to have other features of Parkinson's but I did offer a neurology referral if this medication change is insufficient.    We reviewed his diabetes management.  Now that his GFR has returned to a higher level he can continue to take metformin and is happy with this fact.  He takes a  total daily dose of 50 units of split dose insulin which he purchases and will continue to do so.  I reminded him that given that this includes short acting insulin he must eat after taking the insulin to reduce the risk of hypoglycemic attack.    I refilled his medications and advised him to stop taking any potassium-containing beverages which she does on occasion.    Over the years he has been unable to actively consider changing his diabetic medication regimen and I therefore will not spent time discussing with him other medication options until the point when he indicates that he is ready to make a change.  He understands and agrees with this approach.    Total visit time was 25 mins, all of which was face to face MD time, and over 50% of this time was spent in counseling and coordination of care.

## 2018-12-14 NOTE — RESULT ENCOUNTER NOTE
Jose Guadalupe Shin and Yaneth - your hemoglobin is stable (good) but A1C is higher than it should be - again, we would like it under 8.0 (or approximately average of 150).  Let me know if you want to see a neurologist about the tremors.  Take care, oM Lovett

## 2018-12-14 NOTE — PATIENT INSTRUCTIONS
GFR Estimate   Date Value Ref Range Status   12/13/2018 52.4 (L) >60.0 mL/min/1.7 m2 Final   09/11/2018 37 (L) >60 mL/min/1.73m2 Final   04/10/2018 45.0 (L) >60.0 mL/min/1.7 m2 Final   08/10/2017 52.7 (L) >60.0 mL/min/1.7 m2 Final   04/24/2017 57.4 (L) >60.0 mL/min/1.7 m2 Final     Lab Results   Component Value Date    A1C 8.9 09/11/2018    A1C 9.4 04/10/2018    A1C 8.5 08/10/2017    A1C 8.1 04/24/2017    A1C 9.9 01/19/2017     Goal A1c is under 8.0

## 2018-12-14 NOTE — LETTER
December 21, 2018      Senthil Castillo  [address]  [place] [address]        Dear Senthil and Yaneth,    I additionally checked your thyroid hormone levels - which if elevated, can make tremor worse - but your levels are in the normal range.  Take care!      Please see below for your test results.    Resulted Orders   Hemoglobin A1c ([facility])   Result Value Ref Range    Hemoglobin A1C 9.4 (H) 4.1 - 5.7 %   CBC with Plt ([facility])   Result Value Ref Range    WBC 5.5 4.0 - 11.0 K/uL    RBC 4.3 (L) 4.4 - 5.9 M/uL    Hemoglobin 12.6 (L) 13.3 - 17.7 g/dL    Hematocrit 41.7 40.0 - 53.0 %    MCV 97.4 78.0 - 100.0 fL    MCH 29.4 26.5 - 35.0    MCHC 30.2 (L) 32.0 - 36.0 g/dL    Platelets 340.0 150.0 - 450.0 K/uL   Thyroid [place] ([facility])   Result Value Ref Range    TSH 5.62 (H) 0.30 - 5.00 uIU/mL    Narrative    Test performed by:  [facility]  [address] [place] [address]   T4  Free ([facility])   Result Value Ref Range    T4 Free 0.9 0.7 - 1.8 ng/dL    Narrative    Test performed by:  [facility]  [address] [place] [address]       If you have any questions, please call the clinic to make an appointment.    Sincerely,    Mo Lovett MD

## 2018-12-18 NOTE — RESULT ENCOUNTER NOTE
Jose Guadalupe Shin and Yaneth - I additionally checked your thyroid hormone levels - which if elevated, can make tremor worse - but your levels are in the normal range.  Take care!  Mo Lovett

## 2019-04-30 ENCOUNTER — OFFICE VISIT (OUTPATIENT)
Dept: PHARMACY | Facility: CLINIC | Age: 79
End: 2019-04-30
Payer: COMMERCIAL

## 2019-04-30 VITALS
TEMPERATURE: 97.7 F | DIASTOLIC BLOOD PRESSURE: 99 MMHG | HEART RATE: 69 BPM | RESPIRATION RATE: 20 BRPM | SYSTOLIC BLOOD PRESSURE: 184 MMHG | OXYGEN SATURATION: 98 %

## 2019-04-30 DIAGNOSIS — I10 ESSENTIAL HYPERTENSION: Primary | ICD-10-CM

## 2019-04-30 NOTE — Clinical Note
RHEA- saw your patient for BP study.  B/C his BP was high, we recommended to him to start amlodipine 5mg/d, but he wanted to work on his diet & exercise, and make an appointment with you to discuss it.Nithya

## 2019-04-30 NOTE — PROGRESS NOTES
OBP30 Study Note                                                       Senthil is a 78 year old male here for the OBP-30 Study, IRB [id number], Dr. Evangelina Medina PharmD, Principle Investigator.    Informed consent addressed with patient and patient signed: yes  Patient number: [id number]  Visit number: 1    SUBJECTIVE:  Current blood pressure medications, including dose, directions and last dose:    Lisinopril 40 mg BID; last dose this morning    Metoprolol succinate 200 mg daily; last dose this morning    Total number of blood pressure medications: 2  Race/ethnicity:   Language: English    Assessment of adherence  Over the past 7 days...    I took all doses of my blood pressure medication: always - 1    I missed or skipped at least one dose of my blood pressure medication: never - 1    I was not able to take all of my blood pressure medication: never - 1  Average Score: 1    The following were positive reasons identified for non-adherence:   none    Patient uses a pill box: always      OBJECTIVE:  Patient has the following medical conditions:  Atrial fibrillation or irregular heart rate: no  Obstructive sleep apnea:  no  Chronic kidney disease:  yes, Stage: 3    [place] Readings  Panel: Both Diabetes and Cardiovascular Disease  BP: 168/82     Last three clinic blood pressure readings  BP Readings from Last 3 Encounters:   04/30/19 (!) 184/99   12/14/18 168/82   09/13/18 157/81       Vitals today:   BP (!) 184/99   Pulse 69   Temp 97.7  F (36.5  C) (Oral)   Resp 20   SpO2 98%     OBP-30 Readings  BP 1: 178/95 (not included in average) @ 9:54 AM  BP 2: 173/85 (not included in average)  BP 3: 165/83   BP 4: 158/96   BP 5: 165/93   BP 6: 149/89   BP 7: 153/90     OBP30 BP: 158/90.2     ASSESSMENT AND PLAN:  Senthil has not met the [place] [employer] Measures blood pressure goal of <140/90 based on OBP30 reading.    Pharmacotherapy intervention if not met: Recommended to patient and wife adding amlodipine 5 mg daily.  Patient did not want to add a medication and instead reported he will work on improving his diet with respect to salt intake and try to exercise more, and will schedule an appointment with Dr. Lovett soon to discuss.    This note has been routed to Mo Lovett for their consideration.    Stacie Mancia, PharmD Student    I was present with the pharmacy student who participated in the service and in the documentation of this note. I have verified the history, personally performed the medical decision making, and have verified the content of the note, which accurately reflects my assessment of the patient and the plan of care.   Nithya Eisenberg, PharmD

## 2019-05-20 ENCOUNTER — OFFICE VISIT (OUTPATIENT)
Dept: FAMILY MEDICINE | Facility: CLINIC | Age: 79
End: 2019-05-20
Payer: COMMERCIAL

## 2019-05-20 VITALS
SYSTOLIC BLOOD PRESSURE: 173 MMHG | WEIGHT: 223.8 LBS | TEMPERATURE: 97.7 F | HEIGHT: 70 IN | OXYGEN SATURATION: 97 % | BODY MASS INDEX: 32.04 KG/M2 | HEART RATE: 64 BPM | RESPIRATION RATE: 12 BRPM | DIASTOLIC BLOOD PRESSURE: 88 MMHG

## 2019-05-20 DIAGNOSIS — Z79.4 TYPE 2 DIABETES MELLITUS WITH STAGE 3 CHRONIC KIDNEY DISEASE, WITH LONG-TERM CURRENT USE OF INSULIN (H): ICD-10-CM

## 2019-05-20 DIAGNOSIS — N18.30 CHRONIC KIDNEY DISEASE, STAGE III (MODERATE) (H): Primary | ICD-10-CM

## 2019-05-20 DIAGNOSIS — E11.22 TYPE 2 DIABETES MELLITUS WITH STAGE 3 CHRONIC KIDNEY DISEASE, WITH LONG-TERM CURRENT USE OF INSULIN (H): ICD-10-CM

## 2019-05-20 DIAGNOSIS — N18.30 CHRONIC KIDNEY DISEASE, STAGE III (MODERATE) (H): ICD-10-CM

## 2019-05-20 DIAGNOSIS — G25.0 BENIGN ESSENTIAL TREMOR: ICD-10-CM

## 2019-05-20 DIAGNOSIS — N18.30 TYPE 2 DIABETES MELLITUS WITH STAGE 3 CHRONIC KIDNEY DISEASE, WITH LONG-TERM CURRENT USE OF INSULIN (H): ICD-10-CM

## 2019-05-20 DIAGNOSIS — I12.9 RENAL HYPERTENSION: ICD-10-CM

## 2019-05-20 LAB
BUN SERPL-MCNC: 24.4 MG/DL (ref 7–21)
CALCIUM SERPL-MCNC: 8.8 MG/DL (ref 8.5–10.1)
CHLORIDE SERPLBLD-SCNC: 99.4 MMOL/L (ref 98–110)
CO2 SERPL-SCNC: 30.8 MMOL/L (ref 20–32)
CREAT SERPL-MCNC: 1.6 MG/DL (ref 0.7–1.3)
GFR SERPL CREATININE-BSD FRML MDRD: 44.9 ML/MIN/1.7 M2
GLUCOSE SERPL-MCNC: 171.7 MG'DL (ref 70–99)
HBA1C MFR BLD: 9 % (ref 4.1–5.7)
HCT VFR BLD AUTO: 39.4 % (ref 40–53)
HEMOGLOBIN: 12.3 G/DL (ref 13.3–17.7)
MCH RBC QN AUTO: 29.9 PG (ref 26.5–35)
MCHC RBC AUTO-ENTMCNC: 31.2 G/DL (ref 32–36)
MCV RBC AUTO: 95.8 FL (ref 78–100)
PLATELET # BLD AUTO: 350 K/UL (ref 150–450)
POTASSIUM SERPL-SCNC: 4.7 MMOL/DL (ref 3.2–4.6)
RBC # BLD AUTO: 4.1 M/UL (ref 4.4–5.9)
SODIUM SERPL-SCNC: 134.2 MMOL/L (ref 132–142)
TSH SERPL DL<=0.05 MIU/L-ACNC: 4.15 UIU/ML (ref 0.3–5)
WBC # BLD AUTO: 7.6 K/UL (ref 4–11)

## 2019-05-20 RX ORDER — METOPROLOL SUCCINATE 100 MG/1
TABLET, EXTENDED RELEASE ORAL
Qty: 270 TABLET | Refills: 3 | Status: SHIPPED | OUTPATIENT
Start: 2019-05-20 | End: 2020-06-12

## 2019-05-20 ASSESSMENT — MIFFLIN-ST. JEOR: SCORE: 1743.46

## 2019-05-20 NOTE — LETTER
May 21, 2019      Senthil Castillo  [address]  [place] [address]        Dear Kar,    Your thyroid hormone is now back in the normal range - so you don't need to take any thyroid replacement.  OK?  Hopefully the blood pressure behaves a little better with the medication change.  Do let me know if you're interested in trying the once a week Victoza shot for your diabetes.  Take care!   Please see below for your test results.    Resulted Orders   Hemoglobin A1c ([facility])   Result Value Ref Range    Hemoglobin A1C 9.0 (H) 4.1 - 5.7 %   Thyroid [place] ([facility])   Result Value Ref Range    TSH 4.15 0.30 - 5.00 uIU/mL    Narrative    Test performed by:  [facility] [address] [place] [address]   CBC with Plt ([facility])   Result Value Ref Range    WBC 7.6 4.0 - 11.0 K/uL    RBC 4.1 (L) 4.4 - 5.9 M/uL    Hemoglobin 12.3 (L) 13.3 - 17.7 g/dL    Hematocrit 39.4 (L) 40.0 - 53.0 %    MCV 95.8 78.0 - 100.0 fL    MCH 29.9 26.5 - 35.0    MCHC 31.2 (L) 32.0 - 36.0 g/dL    Platelets 350.0 150.0 - 450.0 K/uL   Basic Metabolic Panel ([facility])   Result Value Ref Range    Urea Nitrogen 24.4 (H) 7.0 - 21.0 mg/dL    Calcium 8.8 8.5 - 10.1 mg/dL    Chloride 99.4 98.0 - 110.0 mmol/L    Carbon Dioxide 30.8 20.0 - 32.0 mmol/L    Creatinine 1.6 (H) 0.7 - 1.3 mg/dL    Glucose 171.7 (H) 70.0 - 99.0 mg'dL    Potassium 4.7 (H) 3.2 - 4.6 mmol/dL    Sodium 134.2 132.0 - 142.0 mmol/L    GFR Estimate 44.9 (L) >60.0 mL/min/1.7 m2    GFR Estimate If Black 54.3 (L) >60.0 mL/min/1.7 m2       If you have any questions, please call the clinic to make an appointment.    Sincerely,    Mo Lovett MD

## 2019-05-20 NOTE — PROGRESS NOTES
"Senthil is a 78-year-old who is well-known to me.  He is accompanied today by his wife.  The principal reason for the visit is that during a recent blood pressure evaluation, his blood pressures were elevated and an additional antihypertensive was recommended.  As has been his pattern he is reluctant to make any changes to his medications and I declined starting a new anti--hypertensive, preferring to first discuss this with me.    He does say that he checks his blood pressure quite regularly in drugstores or shopping centers and that it typically is in the 130s over high 70s to low 80s.  He says that ever since he has been a child he had whitecoat hypertension and just does not like to be in a clinical environment.  He does have a home sphygmomanometer but it is ineffective.  His wife reports that she would be happy to help him track home BP readings.    We discussed his diabetes control which is not at goal as has been the case for several years.  Despite multiple conversations about his insulin type not matching his lifestyle, he has resisted changing.  His diet is generally pretty healthy.  He tries to stay active.  He limits salt and alcohol.    He feels that his tremor is stable on primidone.  He takes this during the day since at nighttime it causes nightmares.    Objective:  /88 (BP Location: Left arm, Patient Position: Sitting, Cuff Size: Adult Large)   Pulse 64   Temp 97.7  F (36.5  C) (Oral)   Resp 12   Ht 1.765 m (5' 9.5\")   Wt 101.5 kg (223 lb 12.8 oz)   SpO2 97%   BMI 32.58 kg/m    His blood pressure is again elevated today.  He is in the obese range.  His pulse is regular.  He has no lower extremity edema.  His feet are examined: He has good dorsalis pedis pulses bilaterally, he has evidence of some hair loss.  Microfilament exam of both feet is negative for neuropathy.  He has no ulcers nor calluses.    PHQ-2: 0    Results for orders placed or performed in visit on 05/20/19   Hemoglobin " A1c ([facility])   Result Value Ref Range    Hemoglobin A1C 9.0 (H) 4.1 - 5.7 %   CBC with Plt ([facility])   Result Value Ref Range    WBC 7.6 4.0 - 11.0 K/uL    RBC 4.1 (L) 4.4 - 5.9 M/uL    Hemoglobin 12.3 (L) 13.3 - 17.7 g/dL    Hematocrit 39.4 (L) 40.0 - 53.0 %    MCV 95.8 78.0 - 100.0 fL    MCH 29.9 26.5 - 35.0    MCHC 31.2 (L) 32.0 - 36.0 g/dL    Platelets 350.0 150.0 - 450.0 K/uL   Basic Metabolic Panel ([facility])   Result Value Ref Range    Urea Nitrogen 24.4 (H) 7.0 - 21.0 mg/dL    Calcium 8.8 8.5 - 10.1 mg/dL    Chloride 99.4 98.0 - 110.0 mmol/L    Carbon Dioxide 30.8 20.0 - 32.0 mmol/L    Creatinine 1.6 (H) 0.7 - 1.3 mg/dL    Glucose 171.7 (H) 70.0 - 99.0 mg'dL    Potassium 4.7 (H) 3.2 - 4.6 mmol/dL    Sodium 134.2 132.0 - 142.0 mmol/L    GFR Estimate 44.9 (L) >60.0 mL/min/1.7 m2    GFR Estimate If Black 54.3 (L) >60.0 mL/min/1.7 m2       Lab Results   Component Value Date    A1C 9.0 05/20/2019    A1C 9.4 12/14/2018    A1C 8.9 09/11/2018    A1C 9.4 04/10/2018    A1C 8.5 08/10/2017     Senthil was seen today for diabetes and hypertension.    Diagnoses and all orders for this visit:    Type 2 diabetes mellitus with stage 3 chronic kidney disease, with long-term current use of insulin (H)  -     Hemoglobin A1c ([facility])    Benign essential tremor  -     Thyroid [place] ([facility])    Chronic kidney disease, stage III (moderate) (H)  -     CBC with Plt ([facility])  -     Basic Metabolic Panel ([facility])    Renal hypertension  -     metoprolol succinate ER (TOPROL-XL) 100 MG 24 hr tablet; Take 2 in the morning, 1 at night  -     Discontinue: Blood Pressure Monitoring (SPHYGMOMANOMETER) MISC; Use daily as needed  -     Blood Pressure Monitoring (SPHYGMOMANOMETER) MISC; Use daily as needed      As previously he is reluctant to make any changes.  He did feel that metoprolol was particularly effective both with controlling hypertension and with tremor and he is more interested in increasing the dose of metoprolol then and adding  in a new antihypertensives.  He keeps track of his pulse and says it is never in the 50s and usually is in the 70s to 80s.  We therefore agreed to increase the dose of metoprolol today.  I have asked him to notify me if his pulse drops below 50 or if he has any disabling side effects with this medication dose increase.    Concerning his diabetes, I strongly recommended he consider adding in weekly Victoza.  However he declines to do so.    I reassured him that he is not significantly anemic and that his CKD appears stable.  He did have a mildly abnormal thyroid function results and will follow up with these.  He is agreeable to taking thyroid replacement if the thyroid remains abnormal.     I strongly urged him to check his blood pressures at home and sent a prescription for a sphygmomanometer.  We will plan to see him again in 3 months or sooner as needed.    I gave him a copy of advance directives to discuss with his wife and return to us.  He is due to have a colonoscopy but would prefer to wait before scheduling this.    Total visit time was 25 mins, all of which was face to face MD time, and over 50% of this time was spent in counseling and coordination of care.

## 2019-05-21 NOTE — RESULT ENCOUNTER NOTE
Jose Guadalupe Ndiaye and Yaneth - actually your thyroid hormone is now back in the normal range - so you don't need to take any thyroid replacement.  OK?  Hopefully the blood pressure behaves a little better with the medication change.  Do let me know if you're interested in trying the once a week Victoza shot for your diabetes.  Take care!  Mo Lovett

## 2019-06-04 ENCOUNTER — TRANSFERRED RECORDS (OUTPATIENT)
Dept: HEALTH INFORMATION MANAGEMENT | Facility: CLINIC | Age: 79
End: 2019-06-04

## 2019-10-04 ENCOUNTER — HEALTH MAINTENANCE LETTER (OUTPATIENT)
Age: 79
End: 2019-10-04

## 2019-11-07 ENCOUNTER — OFFICE VISIT (OUTPATIENT)
Dept: FAMILY MEDICINE | Facility: CLINIC | Age: 79
End: 2019-11-07
Payer: COMMERCIAL

## 2019-11-07 VITALS
DIASTOLIC BLOOD PRESSURE: 95 MMHG | OXYGEN SATURATION: 100 % | TEMPERATURE: 98.5 F | SYSTOLIC BLOOD PRESSURE: 148 MMHG | BODY MASS INDEX: 32.69 KG/M2 | WEIGHT: 224.6 LBS | RESPIRATION RATE: 16 BRPM | HEART RATE: 71 BPM

## 2019-11-07 DIAGNOSIS — Z79.4 TYPE 2 DIABETES MELLITUS WITH STAGE 3 CHRONIC KIDNEY DISEASE, WITH LONG-TERM CURRENT USE OF INSULIN (H): Primary | ICD-10-CM

## 2019-11-07 DIAGNOSIS — R80.1 PERSISTENT PROTEINURIA: ICD-10-CM

## 2019-11-07 DIAGNOSIS — I10 ESSENTIAL HYPERTENSION: ICD-10-CM

## 2019-11-07 DIAGNOSIS — N18.30 TYPE 2 DIABETES MELLITUS WITH STAGE 3 CHRONIC KIDNEY DISEASE, WITH LONG-TERM CURRENT USE OF INSULIN (H): Primary | ICD-10-CM

## 2019-11-07 DIAGNOSIS — E11.22 TYPE 2 DIABETES MELLITUS WITH STAGE 3 CHRONIC KIDNEY DISEASE, WITH LONG-TERM CURRENT USE OF INSULIN (H): Primary | ICD-10-CM

## 2019-11-07 DIAGNOSIS — K40.90 NON-RECURRENT UNILATERAL INGUINAL HERNIA WITHOUT OBSTRUCTION OR GANGRENE: ICD-10-CM

## 2019-11-07 DIAGNOSIS — G25.0 BENIGN ESSENTIAL TREMOR: ICD-10-CM

## 2019-11-07 DIAGNOSIS — N18.30 CHRONIC KIDNEY DISEASE, STAGE III (MODERATE) (H): ICD-10-CM

## 2019-11-07 LAB
ALBUMIN SERPL-MCNC: 3.7 MG/DL (ref 3.3–4.9)
ALP SERPL-CCNC: 76.4 U/L (ref 40–150)
ALT SERPL-CCNC: 18.7 U/L (ref 0–45)
AST SERPL-CCNC: 18.7 U/L (ref 0–55)
BILIRUB SERPL-MCNC: 0.4 MG/DL (ref 0.2–1.3)
BUN SERPL-MCNC: 22.1 MG/DL (ref 7–21)
CALCIUM SERPL-MCNC: 8.8 MG/DL (ref 8.5–10.1)
CHLORIDE SERPLBLD-SCNC: 105.2 MMOL/L (ref 98–110)
CHOLEST SERPL-MCNC: 164.3 MG/DL (ref 0–200)
CHOLEST/HDLC SERPL: 3.6 {RATIO} (ref 0–5)
CO2 SERPL-SCNC: 28 MMOL/L (ref 20–32)
CREAT SERPL-MCNC: 1.4 MG/DL (ref 0.7–1.3)
CREAT UR-MCNC: 128.7 MG/DL
GFR SERPL CREATININE-BSD FRML MDRD: 52.9 ML/MIN/1.7 M2
GLUCOSE SERPL-MCNC: 155.8 MG'DL (ref 70–99)
HBA1C MFR BLD: 8.7 % (ref 4.1–5.7)
HCT VFR BLD AUTO: 38.8 % (ref 40–53)
HDLC SERPL-MCNC: 45.7 MG/DL
HEMOGLOBIN: 12.1 G/DL (ref 13.3–17.7)
LDLC SERPL CALC-MCNC: 88 MG/DL (ref 0–129)
MCH RBC QN AUTO: 29.8 PG (ref 26.5–35)
MCHC RBC AUTO-ENTMCNC: 31.2 G/DL (ref 32–36)
MCV RBC AUTO: 95.5 FL (ref 78–100)
MICROALBUMIN UR-MCNC: 164.76 MG/DL (ref 0–1.99)
MICROALBUMIN/CREAT UR: 1280.2 MG/G
PLATELET # BLD AUTO: 321 K/UL (ref 150–450)
POTASSIUM SERPL-SCNC: 4.9 MMOL/DL (ref 3.2–4.6)
PROT SERPL-MCNC: 6.5 G/DL (ref 6.8–8.8)
RBC # BLD AUTO: 4.1 M/UL (ref 4.4–5.9)
SODIUM SERPL-SCNC: 139.4 MMOL/L (ref 132–142)
T4 FREE SERPL-MCNC: 0.9 NG/DL (ref 0.7–1.8)
TRIGL SERPL-MCNC: 153.1 MG/DL (ref 0–150)
TSH SERPL DL<=0.05 MIU/L-ACNC: 5.83 UIU/ML (ref 0.3–5)
VLDL CHOLESTEROL: 30.6 MG/DL (ref 7–32)
WBC # BLD AUTO: 6.1 K/UL (ref 4–11)

## 2019-11-07 RX ORDER — PRIMIDONE 50 MG/1
100 TABLET ORAL 2 TIMES DAILY
Qty: 360 TABLET | Refills: 3 | Status: SHIPPED | OUTPATIENT
Start: 2019-11-07 | End: 2020-06-18

## 2019-11-07 RX ORDER — LISINOPRIL 40 MG/1
40 TABLET ORAL
Qty: 180 TABLET | Refills: 3 | Status: SHIPPED | OUTPATIENT
Start: 2019-11-07 | End: 2020-06-18

## 2019-11-07 RX ORDER — ASPIRIN 325 MG
325 TABLET ORAL EVERY OTHER DAY
Qty: 90 TABLET | Refills: 3 | Status: SHIPPED | OUTPATIENT
Start: 2019-11-07 | End: 2020-06-18

## 2019-11-07 RX ORDER — ATORVASTATIN CALCIUM 40 MG/1
40 TABLET, FILM COATED ORAL DAILY
Qty: 90 TABLET | Refills: 3 | Status: SHIPPED | OUTPATIENT
Start: 2019-11-07 | End: 2020-06-18

## 2019-11-07 RX ORDER — AMLODIPINE BESYLATE 5 MG/1
5 TABLET ORAL DAILY
Qty: 90 TABLET | Refills: 3 | Status: SHIPPED | OUTPATIENT
Start: 2019-11-07 | End: 2020-06-18

## 2019-11-07 NOTE — LETTER
November 8, 2019      Senthil Castillo  [address]  [place] [address]        Dear Senthil,    As we discussed on the phone, your thyroid function is borderline normal and you could consider taking a little extra thyroid supplement daily - but we agreed to just keep an eye on this and recheck it at a lab draw in 6-12 months.  Otherwise you are losing some protein in your urine - best treatment for this is Lisinopril (which you are on) and keeping your sugars low - which you are trying to do.  Otherwise all stable!  Take care.    Please see below for your test results.    Resulted Orders   Comprehensive Metabolic Panel ([facility])   Result Value Ref Range    Albumin 3.7 3.3 - 4.9 mg/dL    Alkaline Phosphatase 76.4 40.0 - 150.0 U/L    ALT 18.7 0.0 - 45.0 U/L    AST 18.7 0.0 - 55.0 U/L    Bilirubin Total 0.4 0.2 - 1.3 mg/dL    Urea Nitrogen 22.1 (H) 7.0 - 21.0 mg/dL    Calcium 8.8 8.5 - 10.1 mg/dL    Chloride 105.2 98.0 - 110.0 mmol/L    Carbon Dioxide 28.0 20.0 - 32.0 mmol/L    Creatinine 1.4 (H) 0.7 - 1.3 mg/dL    Glucose 155.8 (H) 70.0 - 99.0 mg'dL    Potassium 4.9 (H) 3.2 - 4.6 mmol/dL    Sodium 139.4 132.0 - 142.0 mmol/L    Protein Total 6.5 (L) 6.8 - 8.8 g/dL    GFR Estimate 52.9 (L) >60.0 mL/min/1.7 m2    GFR Estimate If Black 64.0 >60.0 mL/min/1.7 m2   Lipid Panel ([facility])   Result Value Ref Range    Cholesterol 164.3 0.0 - 200.0 mg/dL    Cholesterol/HDL Ratio 3.6 0.0 - 5.0    HDL Cholesterol 45.7 >40.0 mg/dL    LDL Cholesterol Calculated 88 0 - 129 mg/dL    Triglycerides 153.1 (H) 0.0 - 150.0 mg/dL    VLDL Cholesterol 30.6 7.0 - 32.0 mg/dL   Microalbumin Random Ur ([facility])   Result Value Ref Range    Microalbumin, Urine 164.76 (H) 0.00 - 1.99 mg/dL    Creatinine, Urine 128.7 mg/dL    Albumin Urine mg/g Cr 1,280.2 (H) <=19.9 mg/g    Narrative    Test performed by:  [facility]  [address] [place] [address]  Microalbumin, Random Urine  <2.0 mg/dL . . . . . . . . Normal  3.0-30.0 mg/dL . . . .  . . Microalbuminuria  >30.0 mg/dL . . . . . .  . Clinical Proteinuria  Microalbumin/Creatinine Ratio, Random Urine  <20 mg/g . . . . .. . . . Normal   mg/g . . . . . . . Microalbuminuria  >300 mg/g . . . . . . . . Clinical Proteinuria   CBC with Plt ([facility])   Result Value Ref Range    WBC 6.1 4.0 - 11.0 K/uL    RBC 4.1 (L) 4.4 - 5.9 M/uL    Hemoglobin 12.1 (L) 13.3 - 17.7 g/dL    Hematocrit 38.8 (L) 40.0 - 53.0 %    MCV 95.5 78.0 - 100.0 fL    MCH 29.8 26.5 - 35.0    MCHC 31.2 (L) 32.0 - 36.0 g/dL    Platelets 321.0 150.0 - 450.0 K/uL   Thyroid [place] ([facility])   Result Value Ref Range    TSH 5.83 (H) 0.30 - 5.00 uIU/mL    Narrative    Test performed by:  [facility]  [address] [place] [address]   Hemoglobin A1c ([facility])   Result Value Ref Range    Hemoglobin A1C 8.7 (H) 4.1 - 5.7 %   T4  Free ([facility])   Result Value Ref Range    T4 Free 0.9 0.7 - 1.8 ng/dL    Narrative    Test performed by:  [facility]  [address] [place] [address]       If you have any questions, please call the clinic to make an appointment.    Sincerely,    Mo Lovett MD

## 2019-11-07 NOTE — PATIENT INSTRUCTIONS
Love note to Yaneth : )    Good evidence exists that as long as an inguinal hernia is not causing problems (pain, blockage etc.) there is no need to have it repaired.      NEUROLOGY ADULT REFERRAL   [facility]  Phone: [phone]  Fax: [phone]    Referral, demographics, office note and medication list faxed to [phone].     Meka Sweeney

## 2019-11-07 NOTE — PROGRESS NOTES
Senthil is a 79-year-old who is well-known to PCP and presents to clinic primarily to follow up on his diabetes. He checks his glucose daily and says that his fasting AM levels are around 100-150 and evening levels in low 200s. He takes 20 units of an NPH / Regular insulin mix in the morning and 30 units in the evening with his largest meal. Patient has consistently declined taking a more rational, newer form of insulin or a more appropriate regimen for many years and insists on purchasing this mixed insulin over the counter.  He continues to take Metformin. He is not interested in additional DM medications or in a continuous glucose monitor at this time. He denies numbness or tingling in peripheral extremities, urinary changes, or increased thirst. He sees his eye doctor yearly and does not have any diabetic retinopathy.     Regarding his blood pressure - he continues to get it checked frequently in drugstores or shopping centers. He has noticed that it's been higher recently at 155-165/90 (compared to 130-140/70-80 previously). He continues to take lisinopril and metoprolol as prescribed.  He is reluctant to add additional medications to his regimen, but he agrees to discuss this once his kidney function tests are back today. He limits his salt intake and alcohol, does not use tobacco, and is active during the warmer months.    The metoprolol may assist with his tremor along with primidone, but he feels they have not been working lately and his tremor is significant, to the point where he can no longer write. He is on a high dose of metoprolol and says that he does not have episodes of bradycardia. His wife has been encouraging him to see a neurologist - whom he originally saw over 10 years ago but did not follow up with, and he is agreeable to a referral today.     He also wants to check on his right-sided inguinal hernia, which has been present for several years. He feels that it's grown, and sometimes it moves  down into the scrotum. His wife wanted to know if he should get it repaired. It is not painful and there are no skin color changes.     Health maintenance:  He declines a flu shot today.  He is now past 75, the recommended upper limit of screening colonoscopies.    Objective:  BP (!) 148/95   Pulse 71   Temp 98.5  F (36.9  C) (Oral)   Resp 16   Wt 101.9 kg (224 lb 9.6 oz)   SpO2 100%   BMI 32.69 kg/m    His blood pressure is elevated today.  He is in the obese range.    General: He is alert, interactive, and in no acute distress.  Resp: His lungs are clear to auscultation bilaterally with no wheezes, crackles, or rhonchi.  CV: His pulse is regular.  He has no lower extremity edema. 2+ radial pulses bilaterally.  Genital: Mass notable on right side above the inguinal ligament, consistent with an inguinal hernia. No discoloration or erythema. No noted abnormalities of the scrotum.   Diabetic foot exam: He has 2+ dorsalis pedis pulses bilaterally, he has evidence of some hair loss. Microfilament exam of both feet is negative for neuropathy. He has no ulcers nor calluses.    PHQ-2: 0    Results for orders placed or performed in visit on 11/07/19   Comprehensive Metabolic Panel ([place])     Status: Abnormal   Result Value Ref Range    Albumin 3.7 3.3 - 4.9 mg/dL    Alkaline Phosphatase 76.4 40.0 - 150.0 U/L    ALT 18.7 0.0 - 45.0 U/L    AST 18.7 0.0 - 55.0 U/L    Bilirubin Total 0.4 0.2 - 1.3 mg/dL    Urea Nitrogen 22.1 (H) 7.0 - 21.0 mg/dL    Calcium 8.8 8.5 - 10.1 mg/dL    Chloride 105.2 98.0 - 110.0 mmol/L    Carbon Dioxide 28.0 20.0 - 32.0 mmol/L    Creatinine 1.4 (H) 0.7 - 1.3 mg/dL    Glucose 155.8 (H) 70.0 - 99.0 mg'dL    Potassium 4.9 (H) 3.2 - 4.6 mmol/dL    Sodium 139.4 132.0 - 142.0 mmol/L    Protein Total 6.5 (L) 6.8 - 8.8 g/dL    GFR Estimate 52.9 (L) >60.0 mL/min/1.7 m2    GFR Estimate If Black 64.0 >60.0 mL/min/1.7 m2   Lipid Panel ([facility])     Status: Abnormal   Result Value Ref Range     Cholesterol 164.3 0.0 - 200.0 mg/dL    Cholesterol/HDL Ratio 3.6 0.0 - 5.0    HDL Cholesterol 45.7 >40.0 mg/dL    LDL Cholesterol Calculated 88 0 - 129 mg/dL    Triglycerides 153.1 (H) 0.0 - 150.0 mg/dL    VLDL Cholesterol 30.6 7.0 - 32.0 mg/dL   CBC with Plt ([facility])     Status: Abnormal   Result Value Ref Range    WBC 6.1 4.0 - 11.0 K/uL    RBC 4.1 (L) 4.4 - 5.9 M/uL    Hemoglobin 12.1 (L) 13.3 - 17.7 g/dL    Hematocrit 38.8 (L) 40.0 - 53.0 %    MCV 95.5 78.0 - 100.0 fL    MCH 29.8 26.5 - 35.0    MCHC 31.2 (L) 32.0 - 36.0 g/dL    Platelets 321.0 150.0 - 450.0 K/uL   Hemoglobin A1c ([facility])     Status: Abnormal   Result Value Ref Range    Hemoglobin A1C 8.7 (H) 4.1 - 5.7 %       Lab Results   Component Value Date    A1C 9.0 05/20/2019    A1C 9.4 12/14/2018    A1C 8.9 09/11/2018    A1C 9.4 04/10/2018    A1C 8.5 08/10/2017     Senthil was seen today for diabetes, hernia, medication reconciliation and hypertension.    Diagnoses and all orders for this visit:    Type 2 diabetes mellitus with stage 3 chronic kidney disease, with long-term current use of insulin (H)  -     Lipid Panel ([facility])  -     Thyroid [place] ([facility])  -     Hemoglobin A1c ([facility])  -     aspirin (ASA) 325 MG tablet; Take 1 tablet (325 mg) by mouth every other day  -     blood glucose (ONETOUCH ULTRA) test strip; Use up to 3 times daily  -     metFORMIN (GLUCOPHAGE) 1000 MG tablet; Take 1 tablet (1,000 mg) by mouth 2 times daily (with meals)    Chronic kidney disease, stage III (moderate) (H)  -     Comprehensive Metabolic Panel ([place])  -     Microalbumin Random Ur ([facility])  -     CBC with Plt ([facility])  -     atorvastatin (LIPITOR) 40 MG tablet; Take 1 tablet (40 mg) by mouth daily  -     lisinopril (PRINIVIL/ZESTRIL) 40 MG tablet; Take 1 tablet (40 mg) by mouth 2 times daily  -     amLODIPine (NORVASC) 5 MG tablet; Take 1 tablet (5 mg) by mouth daily    Persistent proteinuria  -     Comprehensive Metabolic Panel ([place])  -      Microalbumin [place] ([facility])  -     lisinopril (PRINIVIL/ZESTRIL) 40 MG tablet; Take 1 tablet (40 mg) by mouth 2 times daily    Essential hypertension  -     amLODIPine (NORVASC) 5 MG tablet; Take 1 tablet (5 mg) by mouth daily    Non-recurrent unilateral inguinal hernia without obstruction or gangrene    Benign essential tremor    Other orders  -     primidone (MYSOLINE) 50 MG tablet; Take 2 tablets (100 mg) by mouth 2 times daily  -     NEUROLOGY ADULT REFERRAL; Future        This is a 79-year-old male with PMH of type 2 DM, CAD, HTN, CKD, benign essential tremor, and inguinal hernia who presents today for follow-up.     His blood pressures are increasingly elevated in clinic at 158/93 and at home recently. Although he is reluctant to add additional medications to his regimen, we discussed the consideration of adding amlodipine particularly if today's labs indicate NO decline in kidney function. CMP, thyroid cascade, lipid panel, and urine microalbumin are pending. His Hgb is stable at 12.1 (12.3 in May 2019), which is reassuring.    Regarding his diabetes, his A1c has been gradually decreasing from 9.4 in December 2018 to 8.7 today - but remains ABOVE GOAL. He is congratulated on closer approaching goal and encouraged to continue healthy lifestyle habits. He verbalizes understanding that the goal for his A1c is to reach 8 or below. He does not wish to change his DM medications at this time.    His tremor has been bothersome despite metoprolol and primidone. We will increase the primidone today and send a referral for a neurology consult today.    We also discussed his inguinal hernia, which has been present for many years, is not painful, and does not show signs of incarceration or strangulation. He agrees to watchful waiting, which is appropriate as long as there are no changes or new symptoms.     Return to clinic in 3 months or sooner as needed.    Gladys Fitzgerald, MS3  [facility]  [place]    Preceptor Attestation:  I was present with the medical student who participated in the service and in the documentation of this note. I have verified the history and personally performed the physical exam and medical decision making. I have verified the content of the note, which accurately reflects my assessment of the patient and the plan of care.   Supervising Physician:  Mo Lovett MD.       Total visit time was 40 mins, all of which was face to face MD time, and over 50% of this time was spent in counseling and coordination of care.    Addendum: BMP has come back slightly improved since last check.  Therefore will proceed to add in amlodipine 5mgs daily to uncontrolled hypertension as discussed during visit with patient.  Please notify me if BP remains over systolic of 150 or if any side effects from medication.

## 2019-11-07 NOTE — PROGRESS NOTES
Hemoglobin A1C   Date Value Ref Range Status   11/07/2019 8.7 (H) 4.1 - 5.7 % Final   05/20/2019 9.0 (H) 4.1 - 5.7 % Final   12/14/2018 9.4 (H) 4.1 - 5.7 % Final

## 2019-11-07 NOTE — RESULT ENCOUNTER NOTE
I called both listed numbers and left message that kidney function is stable and has actually improved since last lab check.  No need to see nephrologist for now.  But in light of reported high BPs, recommend adding in Amlodipine 5mgs as discussed during clinic visit.  Notify me if BP remains over 150 or if any side effects from new medication.  MELLY Lovett

## 2019-11-08 NOTE — RESULT ENCOUNTER NOTE
Jose Guadalupe Shin and Yaneth - as we discussed on the phone, your thyroid function is borderline normal and you could consider taking a little extra thyroid supplement daily - but we agreed to just keep an eye on this and recheck it at a lab draw in 6-12 months.  Otherwise you are losing some protein in your urine - best treatment for this is Lisinopril (which you are on) and keeping your sugars low - which you are trying to do.  Otherwise all stable!  Take care, Dr Mo Lovett

## 2020-02-10 ENCOUNTER — HEALTH MAINTENANCE LETTER (OUTPATIENT)
Age: 80
End: 2020-02-10

## 2020-06-08 ENCOUNTER — TELEPHONE (OUTPATIENT)
Dept: FAMILY MEDICINE | Facility: CLINIC | Age: 80
End: 2020-06-08

## 2020-06-12 DIAGNOSIS — N18.30 TYPE 2 DIABETES MELLITUS WITH STAGE 3 CHRONIC KIDNEY DISEASE, WITH LONG-TERM CURRENT USE OF INSULIN (H): Primary | ICD-10-CM

## 2020-06-12 DIAGNOSIS — I12.9 RENAL HYPERTENSION: ICD-10-CM

## 2020-06-12 DIAGNOSIS — N18.30 CHRONIC KIDNEY DISEASE, STAGE III (MODERATE) (H): ICD-10-CM

## 2020-06-12 DIAGNOSIS — E11.22 TYPE 2 DIABETES MELLITUS WITH STAGE 3 CHRONIC KIDNEY DISEASE, WITH LONG-TERM CURRENT USE OF INSULIN (H): Primary | ICD-10-CM

## 2020-06-12 DIAGNOSIS — Z79.4 TYPE 2 DIABETES MELLITUS WITH STAGE 3 CHRONIC KIDNEY DISEASE, WITH LONG-TERM CURRENT USE OF INSULIN (H): Primary | ICD-10-CM

## 2020-06-12 RX ORDER — METOPROLOL SUCCINATE 100 MG/1
TABLET, EXTENDED RELEASE ORAL
Qty: 270 TABLET | Refills: 3 | Status: SHIPPED | OUTPATIENT
Start: 2020-06-12 | End: 2020-06-18

## 2020-06-15 DIAGNOSIS — N18.30 CHRONIC KIDNEY DISEASE, STAGE III (MODERATE) (H): ICD-10-CM

## 2020-06-15 DIAGNOSIS — Z79.4 TYPE 2 DIABETES MELLITUS WITH STAGE 3 CHRONIC KIDNEY DISEASE, WITH LONG-TERM CURRENT USE OF INSULIN (H): ICD-10-CM

## 2020-06-15 DIAGNOSIS — E11.22 TYPE 2 DIABETES MELLITUS WITH STAGE 3 CHRONIC KIDNEY DISEASE, WITH LONG-TERM CURRENT USE OF INSULIN (H): ICD-10-CM

## 2020-06-15 DIAGNOSIS — N18.30 TYPE 2 DIABETES MELLITUS WITH STAGE 3 CHRONIC KIDNEY DISEASE, WITH LONG-TERM CURRENT USE OF INSULIN (H): ICD-10-CM

## 2020-06-15 DIAGNOSIS — R25.2 CRAMP OF LIMB: ICD-10-CM

## 2020-06-15 LAB
ANION GAP SERPL CALCULATED.3IONS-SCNC: 8 MMOL/L (ref 5–18)
BUN SERPL-MCNC: 23 MG/DL (ref 8–28)
CALCIUM SERPL-MCNC: 8.9 MG/DL (ref 8.5–10.5)
CHLORIDE SERPL-SCNC: 105 MMOL/L (ref 98–107)
CO2 SERPL-SCNC: 27 MMOL/L (ref 22–31)
CREAT SERPL-MCNC: 1.6 MG/DL (ref 0.7–1.3)
GLUCOSE SERPL-MCNC: 145 MG/DL (ref 70–125)
HBA1C MFR BLD: 8.6 % (ref 4.1–5.7)
HCT VFR BLD AUTO: 38.1 % (ref 40–53)
HEMOGLOBIN: 11.8 G/DL (ref 13.3–17.7)
MCH RBC QN AUTO: 30.6 PG (ref 26.5–35)
MCHC RBC AUTO-ENTMCNC: 31 G/DL (ref 32–36)
MCV RBC AUTO: 99 FL (ref 78–100)
PLATELET # BLD AUTO: 306 K/UL (ref 150–450)
POTASSIUM SERPL-SCNC: 5.5 MMOL/L (ref 3.5–5)
RBC # BLD AUTO: 3.9 M/UL (ref 4.4–5.9)
SODIUM SERPL-SCNC: 140 MMOL/L (ref 136–145)
WBC # BLD AUTO: 5.1 K/UL (ref 4–11)

## 2020-06-15 NOTE — LETTER
June 23, 2020      Senthil MELLY Castillo  [address]  [place] [address]        Dear ,    We are writing to inform you of your test results.    Kip (and Yaneth) - as we discussed on the phone last week, these labs are pretty stable - still losing some protein in your urine, kidney function remains chronically reduced but stable and average sugar a little better but still not under 8.0 as would be ideal.  Your potassium has crept up a bit -- so we agreed to reduce dose of Lisinopril in the evening to 20mgs (half tab) and avoid taking extra potassium in diet (minimize bananas and potassium in sparkling mcleod.)  We should follow up to make sure this comes down - I have placed orders for repeat labs in 3 months.  Unfortunately the lab was not able to check your magnesium levels - another electrolyte problem to explain muscle cramps - I have added this to future orders for next time lab gets checked - come in sooner if cramps are bothering you a lot.  OK?     Kip, one thing we didn't discuss is that if kidney function remains at this level we will have to reduce your Metformin dose.  However your kidney function tends to bump up and down - so let's re-address this when you get labs checked again.  OK?  Take care!    Resulted Orders   Hemoglobin A1c ([facility])   Result Value Ref Range    Hemoglobin A1C 8.6 (H) 4.1 - 5.7 %   CBC with Plt ([facility])   Result Value Ref Range    WBC 5.1 4.0 - 11.0 K/uL    RBC 3.9 (L) 4.4 - 5.9 M/uL    Hemoglobin 11.8 (L) 13.3 - 17.7 g/dL    Hematocrit 38.1 (L) 40.0 - 53.0 %    MCV 99.0 78.0 - 100.0 fL    MCH 30.6 26.5 - 35.0    MCHC 31.0 (L) 32.0 - 36.0 g/dL    Platelets 306.0 150.0 - 450.0 K/uL   Microalbumin Random Ur ([facility])   Result Value Ref Range    Microalbumin, Urine 97.90 (H) 0.00 - 1.99 mg/dL      Comment:      This is a corrected result. Previous result was >50.00 mg/dL on 6/15/2020 at   2031 CDT      Creatinine, Urine 99.2 mg/dL    Albumin Urine mg/g Cr 986.9 (H) <=19.9  mg/g      Comment:      This is an appended report.  These results have been appended to a previously   final verified report.      Narrative    Test performed by:  [facility] [address] [place] [address]  Microalbumin, Random Urine  <2.0 mg/dL . . . . . . . . Normal  3.0-30.0 mg/dL . . . . . . Microalbuminuria  >30.0 mg/dL . . . . . .  . Clinical Proteinuria  Microalbumin/Creatinine Ratio, Random Urine  <20 mg/g . . . . .. . . . Normal   mg/g . . . . . . . Microalbuminuria  >300 mg/g . . . . . . . . Clinical Proteinuria       If you have any questions or concerns, please call the clinic at the number listed above.       Sincerely,        Mo Lovett MD

## 2020-06-16 ENCOUNTER — TELEPHONE (OUTPATIENT)
Dept: FAMILY MEDICINE | Facility: CLINIC | Age: 80
End: 2020-06-16

## 2020-06-16 LAB
CREAT UR-MCNC: 99.2 MG/DL
MICROALBUMIN UR-MCNC: 97.9 MG/DL (ref 0–1.99)
MICROALBUMIN/CREAT UR: 986.9 MG/G

## 2020-06-16 NOTE — TELEPHONE ENCOUNTER
----- Message from Mo Lovett MD sent at 6/12/2020  2:54 PM CDT -----  Regarding: Labs first  Hi can we ask Kip to come in for lab draw (and leave urine) early next week so results are available for his appt on Thurs?  Thanks, DP

## 2020-06-18 ENCOUNTER — VIRTUAL VISIT (OUTPATIENT)
Dept: FAMILY MEDICINE | Facility: CLINIC | Age: 80
End: 2020-06-18
Payer: COMMERCIAL

## 2020-06-18 VITALS — SYSTOLIC BLOOD PRESSURE: 130 MMHG | DIASTOLIC BLOOD PRESSURE: 80 MMHG | BODY MASS INDEX: 32.02 KG/M2 | WEIGHT: 220 LBS

## 2020-06-18 DIAGNOSIS — R25.2 CRAMP OF LIMB: ICD-10-CM

## 2020-06-18 DIAGNOSIS — R80.1 PERSISTENT PROTEINURIA: ICD-10-CM

## 2020-06-18 DIAGNOSIS — N18.30 CHRONIC KIDNEY DISEASE, STAGE III (MODERATE) (H): ICD-10-CM

## 2020-06-18 DIAGNOSIS — G25.0 BENIGN ESSENTIAL TREMOR: Primary | ICD-10-CM

## 2020-06-18 DIAGNOSIS — Z79.4 TYPE 2 DIABETES MELLITUS WITH STAGE 3 CHRONIC KIDNEY DISEASE, WITH LONG-TERM CURRENT USE OF INSULIN (H): ICD-10-CM

## 2020-06-18 DIAGNOSIS — I12.9 RENAL HYPERTENSION: ICD-10-CM

## 2020-06-18 DIAGNOSIS — N18.30 TYPE 2 DIABETES MELLITUS WITH STAGE 3 CHRONIC KIDNEY DISEASE, WITH LONG-TERM CURRENT USE OF INSULIN (H): ICD-10-CM

## 2020-06-18 DIAGNOSIS — E11.22 TYPE 2 DIABETES MELLITUS WITH STAGE 3 CHRONIC KIDNEY DISEASE, WITH LONG-TERM CURRENT USE OF INSULIN (H): ICD-10-CM

## 2020-06-18 DIAGNOSIS — I10 ESSENTIAL HYPERTENSION: ICD-10-CM

## 2020-06-18 RX ORDER — ASPIRIN 325 MG
325 TABLET ORAL EVERY OTHER DAY
Qty: 90 TABLET | Refills: 3 | Status: SHIPPED | OUTPATIENT
Start: 2020-06-18 | End: 2021-01-14

## 2020-06-18 RX ORDER — AMLODIPINE BESYLATE 5 MG/1
5 TABLET ORAL DAILY
Qty: 90 TABLET | Refills: 3 | Status: SHIPPED | OUTPATIENT
Start: 2020-06-18 | End: 2021-01-14

## 2020-06-18 RX ORDER — ATORVASTATIN CALCIUM 40 MG/1
40 TABLET, FILM COATED ORAL DAILY
Qty: 90 TABLET | Refills: 3 | Status: SHIPPED | OUTPATIENT
Start: 2020-06-18 | End: 2021-01-14

## 2020-06-18 RX ORDER — PRIMIDONE 50 MG/1
100 TABLET ORAL 2 TIMES DAILY
Qty: 360 TABLET | Refills: 3 | Status: SHIPPED | OUTPATIENT
Start: 2020-06-18 | End: 2021-01-14

## 2020-06-18 RX ORDER — PEN NEEDLE, DIABETIC 29 G X1/2"
NEEDLE, DISPOSABLE MISCELLANEOUS
Qty: 180 EACH | Refills: 3 | Status: SHIPPED | OUTPATIENT
Start: 2020-06-18 | End: 2021-01-14

## 2020-06-18 RX ORDER — METOPROLOL SUCCINATE 100 MG/1
TABLET, EXTENDED RELEASE ORAL
Qty: 270 TABLET | Refills: 3 | Status: SHIPPED | OUTPATIENT
Start: 2020-06-18 | End: 2021-01-14

## 2020-06-18 RX ORDER — LISINOPRIL 40 MG/1
40 TABLET ORAL
Qty: 180 TABLET | Refills: 3 | Status: SHIPPED | OUTPATIENT
Start: 2020-06-18 | End: 2021-01-14

## 2020-06-18 NOTE — PROGRESS NOTES
"Family Medicine Telephone Visit Note           Telephone Visit Consent   Patient was verbally read the following and verbal consent was obtained.    \"Telephone visits are billed at different rates depending on your insurance coverage. During this emergency period, for some insurers they may be billed the same as an in-person visit.  Please reach out to your insurance provider with any questions.  If during the course of the call the physician/provider feels a telephone visit is not appropriate, you will not be charged for this service.\"    Name person giving consent:  Patient   Date verbal consent given:  6/18/2020  Time verbal consent given:  8:05 AM        Chief Complaint   Patient presents with     Diabetes     RECHECK     he gets a lot of cramps, like foot, legs, and hand cramps               HPI   Patients name: Senthil  Appointment start time:  8:08AM  Senthil Castillo is a 79 year old man who is well known to Dr. Lovett who is presenting for follow up of diabetes management. He checks his blood glucose at home every day, and in the mornings, his fasting glucose has been between  recently, and his evening glucose has been around 200 or a bit higher. He still uses his regimen of NPH and Regular insulin that he purchases over the counter, and he is not interested in updating his insulin regimen at this time. He also is not interested in trying a continuous glucose monitor at this time. He does report that he has been eating more regular meals than he used to.     Current Outpatient Medications   Medication Sig Dispense Refill     amLODIPine (NORVASC) 5 MG tablet Take 1 tablet (5 mg) by mouth daily 90 tablet 3     aspirin (ASA) 325 MG tablet Take 1 tablet (325 mg) by mouth every other day 90 tablet 3     atorvastatin (LIPITOR) 40 MG tablet Take 1 tablet (40 mg) by mouth daily 90 tablet 3     blood glucose (ONETOUCH ULTRA) test strip Use up to 3 times daily 300 strip 3     insulin pen needle (BD ULTRA-FINE) 29G X " 12.7MM miscellaneous Use twice daily 180 each 3     lisinopril (ZESTRIL) 40 MG tablet Take 1 tablet (40 mg) by mouth 2 times daily 180 tablet 3     metFORMIN (GLUCOPHAGE) 1000 MG tablet Take 1 tablet (1,000 mg) by mouth 2 times daily (with meals) 180 tablet 3     metoprolol succinate ER (TOPROL-XL) 100 MG 24 hr tablet TAKE 2 TABLETS BY MOUTH IN THE MORNING AND 1 TABLET AT NIGHT 270 tablet 3     primidone (MYSOLINE) 50 MG tablet Take 2 tablets (100 mg) by mouth 2 times daily 360 tablet 3     Blood Pressure Monitoring (SPHYGMOMANOMETER) MISC Use daily as needed 1 each 0     Blood Pressure Monitoring (SPHYGMOMANOMETER) MISC 1 Device daily as needed 1 each 0     insulin NPH-insulin regular MIX (NOVOLIN MIX VIAL) (70-30) 100 UNIT/ML Take 20 u morning and 30 u evening       Allergies   Allergen Reactions     Nkda [No Known Drug Allergies]             Review of Systems:     CONSTITUTIONAL: NEGATIVE for fever, chills, change in weight  ENT/MOUTH: NEGATIVE for ear, mouth and throat problems  RESP: NEGATIVE for significant cough or SOB  CV: NEGATIVE for chest pain, palpitations or peripheral edema  CNS: He does not have any new tingling, numbness, or vision changes.  He also continues to have essential tremor in his hands, and he is taking metoprolol and primidone for this, but the tremors are bad enough that he is not able to do fine work with his hands any more. 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   MSK: Mr. Castillo has also had a lot of muscle cramps lately in his arms, legs, and hands that have been keeping him awake at night. These cramps are not as bad when he is able to get exercise during the day.         Physical Exam:     /80   Wt 99.8 kg (220 lb)   BMI 32.02 kg/m    Estimated body mass index is 32.02 kg/m  as calculated from the following:    Height as of 5/20/19: 1.765 m (5'  "9.5\").    Weight as of this encounter: 99.8 kg (220 lb).    Exam:  Constitutional: healthy, alert and no distress  Psychiatric: mentation appears normal and affect normal/bright    Results from last visit:  Orders Only on 06/15/2020   Component Date Value Ref Range Status     Hemoglobin A1C 06/15/2020 8.6* 4.1 - 5.7 % Final     WBC 06/15/2020 5.1  4.0 - 11.0 K/uL Final     RBC 06/15/2020 3.9* 4.4 - 5.9 M/uL Final     Hemoglobin 06/15/2020 11.8* 13.3 - 17.7 g/dL Final     Hematocrit 06/15/2020 38.1* 40.0 - 53.0 % Final     MCV 06/15/2020 99.0  78.0 - 100.0 fL Final     MCH 06/15/2020 30.6  26.5 - 35.0 Final     MCHC 06/15/2020 31.0* 32.0 - 36.0 g/dL Final     Platelets 06/15/2020 306.0  150.0 - 450.0 K/uL Final     Microalbumin, Urine 06/15/2020 97.90* 0.00 - 1.99 mg/dL Corrected    Comment: This is a corrected result. Previous result was >50.00 mg/dL on 6/15/2020 at   2031 CDT       Creatinine, Urine 06/15/2020 99.2  mg/dL Final     Albumin Urine mg/g Cr 06/15/2020 986.9* <=19.9 mg/g Corrected    Comment: This is an appended report.  These results have been appended to a previously   final verified report.       Sodium 06/15/2020 140  136 - 145 mmol/L Final     Potassium 06/15/2020 5.5* 3.5 - 5.0 mmol/L Final     Chloride 06/15/2020 105  98 - 107 mmol/L Final     CO2, Total 06/15/2020 27  22 - 31 mmol/L Final     Anion Gap 06/15/2020 8  5 - 18 mmol/L Final     Glucose 06/15/2020 145* 70 - 125 mg/dL Final     Calcium 06/15/2020 8.9  8.5 - 10.5 mg/dL Final     Urea Nitrogen 06/15/2020 23  8 - 28 mg/dL Final     Creatinine 06/15/2020 1.60* 0.70 - 1.30 mg/dL Final     GFR Estimate If Black 06/15/2020 51* >60 mL/min/1.73m2 Final     GFR Estimate 06/15/2020 42* >60 mL/min/1.73m2 Final           Assessment and Plan     Senthil Castillo is a 79 year old man who was presenting for follow up of diabetes management and evaluation of nocturnal cramps.    #DM2  Mr. Castillo has had fairly consistent sub-optimal control of his diabetes " with metformin and home insulin regimen, and although he could be more tightly controlled on a different regimen, he is not interested in that right now. His A1C of 8.6 is consistent with where it has been at recent visits - a little better. His kidney function is borderline for continuing metformin and dose will need to be reduced if he remains at current GFR but since this has widely fluctuated over time, no changes will be made at this time.  -Continue current insulin regimen  -Continue metformin but check GFR closely    #Nocturnal cramps  #Hyperkalemia  Mr. Castillo describes cramps that occur at night that get bad enough that they keep him awake at times. He also had a higher potassium today at 5.5, which may be or may not be the source of his cramping. He is on an unusually high dose of lisinopril, and he drinks a lot of potassium-containing sparkling water, both of which are contributing to his elevated potassium.   -Continue taking 40mg lisinopril in the morning and decrease to 20mg lisinopril at night  -Decrease the intake of potassium-containing beverages  -Check magnesium level and supplement if necessary  -If these changes do not solve cramps, consider adding a B vitamin complex to help with cramping  -Recheck potassium level in 3 months    #Essential tremor  He has an essential tremor that is managed with metoprolol and primidone. Although it is quite bothersome, he does not desire any changes to the medication at this time nor to follow up with neurology.  -Continue metoprolol and primidone      Refilled medications that would be required in the next 3 months.     After Visit Information:  Will print and mail AVS     Return in about 3 months (around 9/18/2020), or if symptoms worsen or fail to improve, for Lab Work.    Appointment end time: 8.31 AM  This is a telephone visit that took 23 minutes.      Clinician location:  [facility]    Celena Campos MS4    Preceptor Attestation:   I was present with  the medical student who participated in the service and in the documentation of this note. I have verified the history and personally performed the physical exam and medical decision making. I have verified the content of the note, which accurately reflects my assessment of the patient and the plan of care.   Supervising Physician:  Mo Lovett MD.

## 2020-06-22 NOTE — RESULT ENCOUNTER NOTE
Jose Guadalupe Shin (and Yaneth) - as we discussed on the phone last week, these labs are pretty stable - still losing some protein in your urine, kidney function remains chronically reduced but stable and average sugar a little better but still not under 8.0 as would be ideal.  Your potassium has crept up a bit -- so we agreed to reduce dose of Lisinopril in the evening to 20mgs (half tab) and avoid taking extra potassium in diet (minimize bananas and potassium in sparkling [place]cleod.)  We should follow up to make sure this comes down - I have placed orders for repeat labs in 3 months.  Unfortunately the lab was not able to check your magnesium levels - another electrolyte problem to explain muscle cramps - I have added this to future orders for next time lab gets checked - come in sooner if cramps are bothering you a lot.  OK?    Kip, one thing we didn't discuss is that if kidney function remains at this level we will have to reduce your Metformin dose.  However your kidney function tends to bump up and down - so let's re-address this when you get labs checked again.  OK?  Take care!  Mo Lovett

## 2020-11-14 ENCOUNTER — HEALTH MAINTENANCE LETTER (OUTPATIENT)
Age: 80
End: 2020-11-14

## 2021-01-12 ENCOUNTER — APPOINTMENT (OUTPATIENT)
Dept: URGENT CARE | Facility: CLINIC | Age: 81
End: 2021-01-12
Payer: COMMERCIAL

## 2021-01-12 ENCOUNTER — NURSE TRIAGE (OUTPATIENT)
Dept: NURSING | Facility: CLINIC | Age: 81
End: 2021-01-12

## 2021-01-12 DIAGNOSIS — Z79.4 TYPE 2 DIABETES MELLITUS WITH STAGE 3A CHRONIC KIDNEY DISEASE, WITH LONG-TERM CURRENT USE OF INSULIN (H): Primary | ICD-10-CM

## 2021-01-12 DIAGNOSIS — N18.31 TYPE 2 DIABETES MELLITUS WITH STAGE 3A CHRONIC KIDNEY DISEASE, WITH LONG-TERM CURRENT USE OF INSULIN (H): Primary | ICD-10-CM

## 2021-01-12 DIAGNOSIS — E11.22 TYPE 2 DIABETES MELLITUS WITH STAGE 3A CHRONIC KIDNEY DISEASE, WITH LONG-TERM CURRENT USE OF INSULIN (H): Primary | ICD-10-CM

## 2021-01-12 NOTE — TELEPHONE ENCOUNTER
Per discussion with Dr. Lovett, offered patient clinic visit and testing in accordance with clinic protocol for symptomatic patients. Senthil was agreeable to this and is scheduled for a visit tomorrow. ./LR

## 2021-01-12 NOTE — TELEPHONE ENCOUNTER
Patient's wife is calling. He is there with her and he gives permission to speak with her.    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  Bad cold symptoms for the past 5 days now.  Cough, sneezing, runny nose, nasal congestion. No fever. No shortness of breath, chest pain, or chest tightness. No wheezing. He does have a virtual visit on Thursday.   Wondering about COVID-19 testing.  I advised her that he should be tested. I advised her that I would send a message to his PCP to see if he could order the COVID test before his virtual visit this Thursday, and then we can call them back. Otherwise we can also get the order done then as well.   Care advice reviewed. She verbalized understanding.    COVID 19 Nurse Triage Plan/Patient Instructions    Please be aware that novel coronavirus (COVID-19) may be circulating in the community. If you develop symptoms such as fever, cough, or SOB or if you have concerns about the presence of another infection including coronavirus (COVID-19), please contact your health care provider or visit [email or web address].     Disposition/Instructions    Virtual Visit with provider recommended. Reference Visit Selection Guide.    Thank you for taking steps to prevent the spread of this virus.  o Limit your contact with others.  o Wear a simple mask to cover your cough.  o Wash your hands well and often.    Resources    [facility]: About COVID-19: [email or web address]    CDC: What to Do If You're Sick: www.cdc.gov/coronavirus/2019-ncov/about/steps-when-sick.html    CDC: Ending Home Isolation: www.cdc.gov/coronavirus/2019-ncov/hcp/disposition-in-home-patients.html     CDC: Caring for Someone: www.cdc.gov/coronavirus/2019-ncov/if-you-are-sick/care-for-someone.html     [facility]: Interim Guidance for Hospital Discharge to Home: www.health.[facility].[place]/diseases/coronavirus/hcp/hospdischarge.pdf    [facility] clinical trials (COVID-19 research studies):  clinicalaffairs.[facility]-clinical-trials     Below are the COVID-19 hotlines at the [place] Department of Health ([facility]). Interpreters are available.   o For health questions: Call 239-991-4208 or 1-944.408.8016 (7 a.m. to 7 p.m.)  o For questions about schools and childcare: Call 035-760-8610 or 1-987.943.4087 (7 a.m. to 7 p.m.)     Reason for Disposition    [1] HIGH RISK patient (e.g., age > 64 years, diabetes, heart or lung disease, weak immune system) AND [2] new or worsening symptoms    Additional Information    Negative: SEVERE difficulty breathing (e.g., struggling for each breath, speaks in single words)    Negative: Difficult to awaken or acting confused (e.g., disoriented, slurred speech)    Negative: Bluish (or gray) lips or face now    Negative: Shock suspected (e.g., cold/pale/clammy skin, too weak to stand, low BP, rapid pulse)    Negative: Sounds like a life-threatening emergency to the triager    Negative: [1] COVID-19 exposure AND [2] no symptoms    Negative: [1] Lives with someone known to have influenza (flu test positive) AND [2] flu-like symptoms (e.g., cough, runny nose, sore throat, SOB; with or without fever)    Negative: [1] Adult with possible COVID-19 symptoms AND [2] triager concerned about severity of symptoms or other causes    Negative: Immunization reaction suspected (e.g., fever, headache, muscle aches occurring during days 1-3 days after immunization)    Negative: COVID-19 and breastfeeding, questions about    Negative: SEVERE or constant chest pain or pressure (Exception: mild central chest pain, present only when coughing)    Negative: MODERATE difficulty breathing (e.g., speaks in phrases, SOB even at rest, pulse 100-120)    Negative: [1] Headache AND [2] stiff neck (can't touch chin to chest)    Negative: MILD difficulty breathing (e.g., minimal/no SOB at rest, SOB with walking, pulse <100)    Negative: Chest pain or pressure    Negative: Patient sounds very sick or weak to  the triager    Negative: Fever > 103 F (39.4 C)    Negative: [1] Fever > 101 F (38.3 C) AND [2] age > 60    Negative: [1] Fever > 100.0 F (37.8 C) AND [2] bedridden (e.g., nursing home patient, CVA, chronic illness, recovering from surgery)    Protocols used: CORONAVIRUS (COVID-19) DIAGNOSED OR [id number]-KH 12.1    Tonja Tee RN  [facility] [place] Nurse Advisor  1/12/2021 at 9:58 AM

## 2021-01-13 ENCOUNTER — OFFICE VISIT (OUTPATIENT)
Dept: FAMILY MEDICINE | Facility: CLINIC | Age: 81
End: 2021-01-13
Payer: COMMERCIAL

## 2021-01-13 VITALS
TEMPERATURE: 99 F | OXYGEN SATURATION: 96 % | DIASTOLIC BLOOD PRESSURE: 87 MMHG | SYSTOLIC BLOOD PRESSURE: 152 MMHG | HEART RATE: 70 BPM | RESPIRATION RATE: 16 BRPM

## 2021-01-13 DIAGNOSIS — N18.31 TYPE 2 DIABETES MELLITUS WITH STAGE 3A CHRONIC KIDNEY DISEASE, WITH LONG-TERM CURRENT USE OF INSULIN (H): ICD-10-CM

## 2021-01-13 DIAGNOSIS — E11.22 TYPE 2 DIABETES MELLITUS WITH STAGE 3A CHRONIC KIDNEY DISEASE, WITH LONG-TERM CURRENT USE OF INSULIN (H): ICD-10-CM

## 2021-01-13 DIAGNOSIS — R05.9 COUGH: Primary | ICD-10-CM

## 2021-01-13 DIAGNOSIS — Z79.4 TYPE 2 DIABETES MELLITUS WITH STAGE 3A CHRONIC KIDNEY DISEASE, WITH LONG-TERM CURRENT USE OF INSULIN (H): ICD-10-CM

## 2021-01-13 LAB
BUN SERPL-MCNC: 23.8 MG/DL (ref 7–21)
CALCIUM SERPL-MCNC: 8.9 MG/DL (ref 8.5–10.1)
CHLORIDE SERPLBLD-SCNC: 102.2 MMOL/L (ref 98–110)
CHOLEST SERPL-MCNC: 133.1 MG/DL (ref 0–200)
CHOLEST/HDLC SERPL: 3 {RATIO} (ref 0–5)
CO2 SERPL-SCNC: 26.8 MMOL/L (ref 20–32)
CREAT SERPL-MCNC: 1.5 MG/DL (ref 0.7–1.3)
CREAT UR-MCNC: 187 MG/DL
GFR SERPL CREATININE-BSD FRML MDRD: 48.9 ML/MIN/1.7 M2
GLUCOSE SERPL-MCNC: 100.5 MG'DL (ref 70–99)
HBA1C MFR BLD: 8.7 % (ref 4.1–5.7)
HCT VFR BLD AUTO: 40.5 % (ref 40–53)
HDLC SERPL-MCNC: 44.8 MG/DL
HEMOGLOBIN: 11.9 G/DL (ref 13.3–17.7)
LDLC SERPL CALC-MCNC: 52 MG/DL (ref 0–129)
MCH RBC QN AUTO: 29.7 PG (ref 26.5–35)
MCHC RBC AUTO-ENTMCNC: 29.4 G/DL (ref 32–36)
MCV RBC AUTO: 101.1 FL (ref 78–100)
MICROALBUMIN UR-MCNC: 175.91 MG/DL (ref 0–1.99)
MICROALBUMIN/CREAT UR: 940.7 MG/G
PLATELET # BLD AUTO: 331 K/UL (ref 150–450)
POTASSIUM SERPL-SCNC: 4.3 MMOL/L (ref 3.2–4.6)
RBC # BLD AUTO: 4 M/UL (ref 4.4–5.9)
SARS-COV-2 RNA RESP QL NAA+PROBE: NOT DETECTED
SODIUM SERPL-SCNC: 138.2 MMOL/L (ref 132–142)
SPECIMEN SOURCE: NORMAL
TRIGL SERPL-MCNC: 180 MG/DL (ref 0–150)
VLDL CHOLESTEROL: 36 MG/DL (ref 7–32)
WBC # BLD AUTO: 6 K/UL (ref 4–11)

## 2021-01-13 PROCEDURE — 36415 COLL VENOUS BLD VENIPUNCTURE: CPT | Performed by: STUDENT IN AN ORGANIZED HEALTH CARE EDUCATION/TRAINING PROGRAM

## 2021-01-13 PROCEDURE — 87635 SARS-COV-2 COVID-19 AMP PRB: CPT | Performed by: STUDENT IN AN ORGANIZED HEALTH CARE EDUCATION/TRAINING PROGRAM

## 2021-01-13 PROCEDURE — 99213 OFFICE O/P EST LOW 20 MIN: CPT | Mod: CS | Performed by: STUDENT IN AN ORGANIZED HEALTH CARE EDUCATION/TRAINING PROGRAM

## 2021-01-13 PROCEDURE — 80048 BASIC METABOLIC PNL TOTAL CA: CPT | Performed by: STUDENT IN AN ORGANIZED HEALTH CARE EDUCATION/TRAINING PROGRAM

## 2021-01-13 PROCEDURE — 80061 LIPID PANEL: CPT | Performed by: STUDENT IN AN ORGANIZED HEALTH CARE EDUCATION/TRAINING PROGRAM

## 2021-01-13 PROCEDURE — 85027 COMPLETE CBC AUTOMATED: CPT | Performed by: STUDENT IN AN ORGANIZED HEALTH CARE EDUCATION/TRAINING PROGRAM

## 2021-01-13 PROCEDURE — 83036 HEMOGLOBIN GLYCOSYLATED A1C: CPT | Performed by: STUDENT IN AN ORGANIZED HEALTH CARE EDUCATION/TRAINING PROGRAM

## 2021-01-13 NOTE — PROGRESS NOTES
"ASSESSMENT AND PLAN     1. Cough  Mild symptoms, potential exposure during babysitting of great-grandchild. Appears quite well today. Will run COVID swab and follow up regarding results. We discussed options for OTC treatment of symptoms  - COVID-19 Virus PCR MRF ([facility])    2. Type 2 diabetes mellitus with stage 3a chronic kidney disease, with long-term current use of insulin (H)  Running DM2 labs today, Senthil will follow up with his PCP tomorrow with virtual visit and will be able to discuss results at that time. He reports that PCP \"won't be happy with me\" because he \"doesn't always follow the rules\" for his diabetes.  - CBC with Plt ([facility])  - [facility] ([facility])  - Hemoglobin A1c ([facility])  - Basic Metabolic Panel ([facility])  - Lipid Panel ([facility])      I ended our visit today by discussing the patient's diagnoses and recommended treatment. Please refer to today's diagnoses and orders for further details. I briefly discussed the pathophysiology of these conditions and outlined their expected course. I discussed the warning symptoms and signs that indicate an atypical course that would need urgent or emergent care. I also discussed self care strategies for symptom relief. Patient voiced understanding of plan of care and was in full agreement to proceed as discussed.    RTC in 1 day for follow up of DM2       SUBJECTIVE       Senthil Castillo is a 80 year old  male with a PMH significant for:     Patient Active Problem List   Diagnosis     Chronic kidney disease, stage III (moderate)     Coronary atherosclerosis     Pure hypercholesterolemia     Essential hypertension     Proteinuria     Calculus of kidney     Inguinal hernia     Premature beats     History of colonic polyps     Type 2 diabetes mellitus with diabetic chronic kidney disease (H)     Benign essential tremor       He presents with cough.    Patient was intending to schedule a visit to follow up on his diabetes with his PCP, however he " has had a cough for three days and would like to address that first. Also experiencing mild sore throat. No fevers or chills, headache or body aches, GI upset. Patient has had no sick contacts that he is aware of. The only person he has seen in the past two weeks was his 1.5yr old great grand, who he and his wife kimit about 5 days ago, and briefly their grandchildren who picked him up and dropped him off.     PMH, Medications and Allergies were reviewed and updated as needed.        REVIEW OF SYSTEMS     CONSTITUTIONAL: NEGATIVE for fever, chills, change in weight  INTEGUMENTARY/SKIN: NEGATIVE for worrisome rashes, moles or lesions  ENT/MOUTH: sore throat  RESP: Mild cough  CV: NEGATIVE for chest pain  GI: NEGATIVE for nausea, abdominal pain, heartburn, or change in bowel habits  MUSCULOSKELETAL: NEGATIVE for significant arthralgias or myalgia  NEURO: NEGATIVE for headache  PSYCHIATRIC: NEGATIVE for changes in mood or affect        OBJECTIVE     Vitals:    01/13/21 1416   BP: (!) 152/87   Pulse: 70   Resp: 16   Temp: 99  F (37.2  C)   TempSrc: Oral   SpO2: 96%     There is no height or weight on file to calculate BMI.    Constitutional: Awake, alert, cooperative, no apparent distress, and appears stated age.  ENT: oral pharynx with moist mucus membranes  Hematologic / Lymphatic: No cervical lymphadenopathy and no supraclavicular lymphadenopathy.  Lungs: No increased work of breathing, good air exchange, clear to auscultation bilaterally, no crackles or wheezing.  Cardiovascular: Regular rate and rhythm, normal S1 and S2  Abdomen: soft, non-distended, non-tender  Musculoskeletal: No redness, warmth, or swelling of the joints.   Neuropsychiatric: Normal affect, mood, orientation, memory and insight.  Skin: No rashes, erythema, pallor, petechia or purpura.      Patient staffed with Dr. Rolando Lindsay MD

## 2021-01-13 NOTE — PROGRESS NOTES
Preceptor Attestation:    Patient seen and evaluated in person. I discussed the patient with the resident. I have verified the content of the note, which accurately reflects my assessment of the patient and the plan of care.   Supervising Physician:  Archie Marshall MD.

## 2021-01-14 ENCOUNTER — VIRTUAL VISIT (OUTPATIENT)
Dept: FAMILY MEDICINE | Facility: CLINIC | Age: 81
End: 2021-01-14
Payer: COMMERCIAL

## 2021-01-14 DIAGNOSIS — I12.9 RENAL HYPERTENSION: ICD-10-CM

## 2021-01-14 DIAGNOSIS — Z79.4 TYPE 2 DIABETES MELLITUS WITH STAGE 3A CHRONIC KIDNEY DISEASE, WITH LONG-TERM CURRENT USE OF INSULIN (H): ICD-10-CM

## 2021-01-14 DIAGNOSIS — N18.31 TYPE 2 DIABETES MELLITUS WITH STAGE 3A CHRONIC KIDNEY DISEASE, WITH LONG-TERM CURRENT USE OF INSULIN (H): ICD-10-CM

## 2021-01-14 DIAGNOSIS — E11.22 TYPE 2 DIABETES MELLITUS WITH STAGE 3A CHRONIC KIDNEY DISEASE, WITH LONG-TERM CURRENT USE OF INSULIN (H): ICD-10-CM

## 2021-01-14 DIAGNOSIS — G25.0 BENIGN ESSENTIAL TREMOR: Primary | ICD-10-CM

## 2021-01-14 DIAGNOSIS — I10 ESSENTIAL HYPERTENSION: ICD-10-CM

## 2021-01-14 DIAGNOSIS — R80.1 PERSISTENT PROTEINURIA: ICD-10-CM

## 2021-01-14 PROCEDURE — 99443 PR PHYSICIAN TELEPHONE EVALUATION 21-30 MIN: CPT | Mod: 95 | Performed by: FAMILY MEDICINE

## 2021-01-14 RX ORDER — LISINOPRIL 40 MG/1
40 TABLET ORAL
Qty: 180 TABLET | Refills: 1 | Status: SHIPPED | OUTPATIENT
Start: 2021-01-14 | End: 2021-06-07

## 2021-01-14 RX ORDER — ATORVASTATIN CALCIUM 40 MG/1
40 TABLET, FILM COATED ORAL DAILY
Qty: 90 TABLET | Refills: 1 | Status: SHIPPED | OUTPATIENT
Start: 2021-01-14 | End: 2021-07-29

## 2021-01-14 RX ORDER — AMLODIPINE BESYLATE 5 MG/1
5 TABLET ORAL DAILY
Qty: 90 TABLET | Refills: 1 | Status: SHIPPED | OUTPATIENT
Start: 2021-01-14 | End: 2021-06-07

## 2021-01-14 RX ORDER — METOPROLOL SUCCINATE 100 MG/1
TABLET, EXTENDED RELEASE ORAL
Qty: 270 TABLET | Refills: 1 | Status: SHIPPED | OUTPATIENT
Start: 2021-01-14 | End: 2021-09-14

## 2021-01-14 RX ORDER — PRIMIDONE 50 MG/1
100 TABLET ORAL 2 TIMES DAILY
Qty: 360 TABLET | Refills: 1 | Status: SHIPPED | OUTPATIENT
Start: 2021-01-14 | End: 2021-06-07

## 2021-01-14 RX ORDER — ASPIRIN 325 MG
325 TABLET ORAL EVERY OTHER DAY
Qty: 90 TABLET | Refills: 1 | Status: SHIPPED | OUTPATIENT
Start: 2021-01-14 | End: 2022-02-11

## 2021-01-14 RX ORDER — PEN NEEDLE, DIABETIC 29 G X1/2"
NEEDLE, DISPOSABLE MISCELLANEOUS
Qty: 180 EACH | Refills: 1 | Status: SHIPPED | OUTPATIENT
Start: 2021-01-14 | End: 2022-02-11

## 2021-01-14 NOTE — RESULT ENCOUNTER NOTE
Discussed during visit with patient.  Agreed to add in Jardiance (empalgiflozin) which is covered by Medicare part D plan.  Recommend follow up in 3-6 months.

## 2021-01-14 NOTE — PROGRESS NOTES
Senthil is a 80 year old who is being evaluated via a billable telephone visit.      What phone number would you like to be contacted at? [phone]  How would you like to obtain your AVS? Mail a copy  Assessment & Plan   Senthil was seen today for diabetes and medication reconciliation.    Diagnoses and all orders for this visit:    Benign essential tremor  -     NEUROLOGY ADULT REFERRAL; Future  -     primidone (MYSOLINE) 50 MG tablet; Take 2 tablets (100 mg) by mouth 2 times daily    Type 2 diabetes mellitus with stage 3a chronic kidney disease, with long-term current use of insulin (H)  -     empagliflozin (JARDIANCE) 10 MG TABS tablet; Take 1 tablet (10 mg) by mouth daily  -     aspirin (ASA) 325 MG tablet; Take 1 tablet (325 mg) by mouth every other day  -     atorvastatin (LIPITOR) 40 MG tablet; Take 1 tablet (40 mg) by mouth daily  -     blood glucose (ONETOUCH ULTRA) test strip; Use up to 3 times daily  -     insulin pen needle (BD ULTRA-FINE) 29G X 12.7MM miscellaneous; Use twice daily  -     lisinopril (ZESTRIL) 40 MG tablet; Take 1 tablet (40 mg) by mouth 2 times daily  -     metFORMIN (GLUCOPHAGE) 1000 MG tablet; Take 1 tablet (1,000 mg) by mouth 2 times daily (with meals)    Chronic kidney disease, stage III (moderate)    Essential hypertension  -     amLODIPine (NORVASC) 5 MG tablet; Take 1 tablet (5 mg) by mouth daily  -     lisinopril (ZESTRIL) 40 MG tablet; Take 1 tablet (40 mg) by mouth 2 times daily    Type 2 diabetes mellitus with stage 3 chronic kidney disease, with long-term current use of insulin (H)    Renal hypertension  -     metoprolol succinate ER (TOPROL-XL) 100 MG 24 hr tablet; TAKE 2 TABLETS BY MOUTH IN THE MORNING AND 1 TABLET AT NIGHT    Persistent proteinuria  -     lisinopril (ZESTRIL) 40 MG tablet; Take 1 tablet (40 mg) by mouth 2 times daily      Given that his A1c remains below goal, I suggested adding in a new agent.  After discussion and with some reassurance that it should  "be safe, not worsen hypoglycemia and is actually indicated in chronic kidney disease he is agreeable to try this.  I discussed this with pharmacy who cannot indicate how much this is going to cost him unfortunately.    In addition I placed a referral for neurology.  I have encouraged him to follow-up as needed and certainly if he does experience any low blood sugar readings.       FUTURE APPOINTMENTS:       - Follow-up visit in 3-6 months or sooner PRN    Return in about 3 months (around 4/14/2021), or if symptoms worsen or fail to improve.    Mo Lovett MD  [facility]    Rashi Ndiaye is a 80 year old who presents to clinic today for the following health issues  accompanied by his partner:    DUKE Medeiros is a 80-year-old well-known to me.  He attends today for a telephone encounter accompanied by his partner Yaneth.  They had attended clinic yesterday both for a COVID-19 test as well as having his routine labs checked.  The results are available to me.  He reports that he is doing well.  He believes his diabetes control is \"as good as usual quotes.  Over many years he has been reluctant to make changes and is satisfied with moderate control and declines to shoot for tight control.  This is in large part due to his desire to avoid hypoglycemia.  He is taking an improper insulin type for his dietary habit but has been unwilling to change this and we no longer discuss adjusting his insulin.    His wife adds that his tremor has worsened.  She had been under the understanding that he was being referred back to neurology although per my recollection he had declined a referral at last visit 6 months ago.  In any case today he is agreeable to this.      Review of Systems   No other concerns  ENT/pulmonary: He may have been exposed to COVID-19 from a grandchild.  He has what appears to be a cold with stuffy nose and slight cough but does not feel unwell.  He is awaiting COVID-19 test " result.      Objective           Vitals:  No vitals were obtained today due to virtual visit.    Physical Exam   healthy, alert and no distress  PSYCH: Alert and oriented times 3; coherent speech, normal   rate and volume, able to articulate logical thoughts, able   to abstract reason, no tangential thoughts, no hallucinations   or delusions  His affect is pleasant  RESP: No cough, no audible wheezing, able to talk in full sentences  Remainder of exam unable to be completed due to telephone visits    Office Visit on 01/13/2021   Component Date Value Ref Range Status     WBC 01/13/2021 6.0  4.0 - 11.0 K/uL Final     RBC 01/13/2021 4.0* 4.4 - 5.9 M/uL Final     Hemoglobin 01/13/2021 11.9* 13.3 - 17.7 g/dL Final     Hematocrit 01/13/2021 40.5  40.0 - 53.0 % Final     MCV 01/13/2021 101.1* 78.0 - 100.0 fL Final     MCH 01/13/2021 29.7  26.5 - 35.0 Final     MCHC 01/13/2021 29.4* 32.0 - 36.0 g/dL Final     Platelets 01/13/2021 331.0  150.0 - 450.0 K/uL Final     Microalbumin, Urine 01/13/2021 175.91* 0.00 - 1.99 mg/dL Final     Creatinine, Urine 01/13/2021 187.0  mg/dL Final     Albumin Urine mg/g Cr 01/13/2021 940.7* <=19.9 mg/g Final     Hemoglobin A1C 01/13/2021 8.7* 4.1 - 5.7 % Final     Urea Nitrogen 01/13/2021 23.8* 7.0 - 21.0 mg/dL Final     Calcium 01/13/2021 8.9  8.5 - 10.1 mg/dL Final     Chloride 01/13/2021 102.2  98.0 - 110.0 mmol/L Final     Carbon Dioxide 01/13/2021 26.8  20.0 - 32.0 mmol/L Final     Creatinine 01/13/2021 1.5* 0.7 - 1.3 mg/dL Final     Glucose 01/13/2021 100.5* 70.0 - 99.0 mg'dL Final     Potassium 01/13/2021 4.3  3.2 - 4.6 mmol/L Final     Sodium 01/13/2021 138.2  132.0 - 142.0 mmol/L Final     GFR Estimate 01/13/2021 48.9* >60.0 mL/min/1.7 m2 Final     GFR Estimate If Black 01/13/2021 59.2* >60.0 mL/min/1.7 m2 Final     Cholesterol 01/13/2021 133.1  0.0 - 200.0 mg/dL Final     Cholesterol/HDL Ratio 01/13/2021 3.0  0.0 - 5.0 Final     HDL Cholesterol 01/13/2021 44.8  >40.0 mg/dL Final      LDL Cholesterol Calculated 01/13/2021 52  0 - 129 mg/dL Final     Triglycerides 01/13/2021 180.0* 0.0 - 150.0 mg/dL Final     VLDL Cholesterol 01/13/2021 36.0* 7.0 - 32.0 mg/dL Final             Phone call duration: 22 minutes

## 2021-01-15 ENCOUNTER — TRANSCRIBE ORDERS (OUTPATIENT)
Dept: OTHER | Age: 81
End: 2021-01-15

## 2021-01-15 DIAGNOSIS — R25.1 TREMOR: Primary | ICD-10-CM

## 2021-01-15 NOTE — PATIENT INSTRUCTIONS
01/15/21   NEUROLOGY REFERRAL  [facility]  [address] [place] [address]    Phone: [phone]  Fax: [phone]    Fax demographics and referral to [phone], They will contact patient to schedule.     Meka Sweeney

## 2021-02-12 ENCOUNTER — IMMUNIZATION (OUTPATIENT)
Dept: NURSING | Facility: CLINIC | Age: 81
End: 2021-02-12
Payer: COMMERCIAL

## 2021-02-12 PROCEDURE — 91300 PR COVID VAC PFIZER DIL RECON 30 MCG/0.3 ML IM: CPT

## 2021-02-12 PROCEDURE — 0001A PR COVID VAC PFIZER DIL RECON 30 MCG/0.3 ML IM: CPT

## 2021-03-01 NOTE — TELEPHONE ENCOUNTER
RECORDS RECEIVED FROM: Internal   Date of Appt: 4/2/21   NOTES (FOR ALL VISITS) STATUS DETAILS   OFFICE NOTE from referring provider Internal Dr Mo Lovett @ [facility] (PCP):  1/14/21   OFFICE NOTE from other specialist N/A    DISCHARGE SUMMARY from hospital N/A    DISCHARGE REPORT from the ER N/A    OPERATIVE REPORT N/A    MEDICATION LIST Internal    IMAGING  (FOR ALL VISITS)     EMG N/A    EEG N/A    LUMBAR PUNCTURE N/A    SHAY SCAN N/A    ULTRASOUND (CAROTID BILAT) *VASCULAR* N/A    MRI (HEAD, NECK, SPINE) N/A    CT (HEAD, NECK, SPINE) N/A

## 2021-03-02 ENCOUNTER — DOCUMENTATION ONLY (OUTPATIENT)
Dept: CARE COORDINATION | Facility: CLINIC | Age: 81
End: 2021-03-02

## 2021-03-05 ENCOUNTER — IMMUNIZATION (OUTPATIENT)
Dept: NURSING | Facility: CLINIC | Age: 81
End: 2021-03-05
Attending: FAMILY MEDICINE
Payer: COMMERCIAL

## 2021-03-05 PROCEDURE — 91300 PR COVID VAC PFIZER DIL RECON 30 MCG/0.3 ML IM: CPT

## 2021-03-05 PROCEDURE — 0002A PR COVID VAC PFIZER DIL RECON 30 MCG/0.3 ML IM: CPT

## 2021-03-28 ENCOUNTER — HEALTH MAINTENANCE LETTER (OUTPATIENT)
Age: 81
End: 2021-03-28

## 2021-04-02 ENCOUNTER — OFFICE VISIT (OUTPATIENT)
Dept: NEUROLOGY | Facility: CLINIC | Age: 81
End: 2021-04-02
Attending: FAMILY MEDICINE
Payer: COMMERCIAL

## 2021-04-02 ENCOUNTER — PRE VISIT (OUTPATIENT)
Dept: NEUROLOGY | Facility: CLINIC | Age: 81
End: 2021-04-02

## 2021-04-02 VITALS
SYSTOLIC BLOOD PRESSURE: 187 MMHG | DIASTOLIC BLOOD PRESSURE: 100 MMHG | HEART RATE: 74 BPM | WEIGHT: 218 LBS | BODY MASS INDEX: 31.73 KG/M2 | OXYGEN SATURATION: 98 % | RESPIRATION RATE: 16 BRPM

## 2021-04-02 DIAGNOSIS — R25.1 TREMOR: Primary | ICD-10-CM

## 2021-04-02 PROCEDURE — 99204 OFFICE O/P NEW MOD 45 MIN: CPT | Mod: GC | Performed by: PSYCHIATRY & NEUROLOGY

## 2021-04-02 ASSESSMENT — ACTIVITIES OF DAILY LIVING (ADL)
TOTAL_SCORE: 21
OVERALL_DISABILITY_WITH_THE_MOST_AFFECTED_TASK: (2) MILDLY ABNORMAL. TREMOR INTERFERES WITH TASK BUT IS STILL ABLE TO PERFORM TASK.
SPEAKING: (0) NORMAL
HYGIENE: (1) SLIGHTLY ABNORMAL. TREMOR IS PRESENT BUT DOES NOT INTERFERE WITH HYGIENE.
DRINKING_FROM_A_GLASS: (2) MILDLY ABNORMAL. SPILLS A LITTLE.
POURING: (2) MILDLY ABNORMAL. MUST BE VERY CAREFUL TO AVOID SPILLING BUT MAY SPILL OCCASIONALLY.
USING_KEYS: (1) SLIGHTLY ABNORMAL. TREMOR IS PRESENT BUT CAN INSERT KEY WITH ONE HAND WITHOUT DIFFICULTY.
WRITING: (4) SEVERELY ABNORMAL. CANNOT WRITE.
WORKING: (2) MILDLY ABNORMAL. TREMOR INTERFERES WITH WORK, ABLE TO DO EVERYTHING, BUT WITH ERRORS.
OVERALL_DISABILITY_WITH_THE_MOST_AFFECTED_TASK: WRITING
DRESS: (1) SLIGHTLY ABNORMAL. TREMOR IS PRESENT BUT DOES NOT INTERFERE WITH DRESSING.
FEEDING_WITH_A_SPOON: (2) MILDLY ABNORMAL. SPILLS A LITTLE.
SOCIAL_IMPACT: (1) AWARE OF TREMOR, BUT IT DOES NOT AFFECT LIFESTYLE OR PROFESSIONAL LIFE.
CARRYING_FOOD_TRAYS_PLATES_OR_SIMILAR_ITEMS: (3) MODERATELY ABNORMAL. USES STRATEGIES SUCH AS HOLDING TIGHTLY AGAINST BODY TO CARRY.

## 2021-04-02 ASSESSMENT — PAIN SCALES - GENERAL: PAINLEVEL: NO PAIN (0)

## 2021-04-02 NOTE — PROGRESS NOTES
"Department of Neurology  Movement Disorders Division     Patient: Senthil Castillo   MRN: [id number]   : 1940   Date of Visit: 2021     Referring Physician: Mo Lovett MD     Dear Colleague,     Thank you for referring your patient, Mr. Castillo, to the [facility] NEUROLOGY at [facility]. Please see a copy of my visit note below.    Reason for visit: tremor    History of Present Illness    Mr. Castillo is a 80 year old ambidextrous  male with PMH of Essential Tremor  that presents to Neurology Movement clinic as a new patient for evaluation of tremors. He is accompanied by his wife.     Mr. Castillo reports that bilateral hand tremors started when he was 64 years old and throughout the years tremors are getting progressively worse . He is having difficulty witting, unable to do intricate things, like holding a screwdriver, working in his cars. He is not disabled by the tremors, does not interfere with activities of daily living.    Medications tried in the past: propranolol for over 10 years , unable to recall the dose, did not work .  He has been on Primidone twice- years ago it was started a bedtime and he experienced \" bad dreams\"  ,he fell off the bed and broke the table . The second trial was in the last year , he was taking during the day 100 mg twice a day, quit taking 50 days ago while waiting to be seen in Movement Disorders Clinic for possible alternative treatment options. He thinks he did notice some benefit in tremor control while on primidone , no side effects.    He would like to improve bilateral hand tremor so he can enjoy playing cards, fishing, working on old cars, riding motorcycle.    There is no  family history of mental retardation in male relatives and no history of premature ovarian failure in female relatives.    He noted right hand resting tremor in the last 8-9 years. He is denying change in gait. He states he can not kneel on the floor due to \"right leg " "stiffness\", with difficulty bending the knee. No changes in the sense of smell, no mood , memory concerns.      Tremors is exacerbated by anxiety and stress. He does not consume alcohol, unclear if tremor is releived bu alcohol  intake. He does have a family history of tremors on the grandfather's side.      ADL Sub-Scale 4/2/2021   1. Speaking 0   2. Feeding with a spoon 2   3. Drinking from a glass 2   4. Hygiene 1   5. Dressing 1   6. Pouring 2   7. Carrying food trays, plates or similar items 3   8. Using keys 1   9. Writing 4   10. Working 2   11. Overall disability with the most affected task 2   Name of most affected task: writing   12. Social impact 1   ADL TOTAL 21       MEDICATIONS for TREMOR: ( stopped taking 50 days ago).    Medications for tremor        Primidone 50 mg  2 tab 2 tab                 Other than that mentioned above, the remainder of 12 systems reviewed were negative.    Medications:  Current Outpatient Medications   Medication Sig Dispense Refill     amLODIPine (NORVASC) 5 MG tablet Take 1 tablet (5 mg) by mouth daily 90 tablet 1     aspirin (ASA) 325 MG tablet Take 1 tablet (325 mg) by mouth every other day 90 tablet 1     atorvastatin (LIPITOR) 40 MG tablet Take 1 tablet (40 mg) by mouth daily 90 tablet 1     blood glucose (ONETOUCH ULTRA) test strip Use up to 3 times daily 300 strip 1     insulin NPH-insulin regular MIX (NOVOLIN MIX VIAL) (70-30) 100 UNIT/ML Take 20 u morning and 30 u evening       insulin pen needle (BD ULTRA-FINE) 29G X 12.7MM miscellaneous Use twice daily 180 each 1     lisinopril (ZESTRIL) 40 MG tablet Take 1 tablet (40 mg) by mouth 2 times daily 180 tablet 1     metFORMIN (GLUCOPHAGE) 1000 MG tablet Take 1 tablet (1,000 mg) by mouth 2 times daily (with meals) 180 tablet 1     metoprolol succinate ER (TOPROL-XL) 100 MG 24 hr tablet TAKE 2 TABLETS BY MOUTH IN THE MORNING AND 1 TABLET AT NIGHT 270 tablet 1     Blood Pressure Monitoring (SPHYGMOMANOMETER) MISC Use " daily as needed (Patient not taking: Reported on 4/2/2021) 1 each 0     Blood Pressure Monitoring (SPHYGMOMANOMETER) MISC 1 Device daily as needed (Patient not taking: Reported on 4/2/2021) 1 each 0     empagliflozin (JARDIANCE) 10 MG TABS tablet Take 1 tablet (10 mg) by mouth daily (Patient not taking: Reported on 4/2/2021) 90 tablet 1     primidone (MYSOLINE) 50 MG tablet Take 2 tablets (100 mg) by mouth 2 times daily (Patient not taking: Reported on 4/2/2021) 360 tablet 1          Allergies: is allergic to nkda [no known drug allergies].    Past Medical History:   No past medical history on file.    Past Surgical History:   No past surgical history on file.    Social History:   Social History     Socioeconomic History     Marital status:      Spouse name: Not on file     Number of children: Not on file     Years of education: Not on file     Highest education level: Not on file   Occupational History     Not on file   Social Needs     Financial resource strain: Not on file     Food insecurity     Worry: Not on file     Inability: Not on file     Transportation needs     Medical: Not on file     Non-medical: Not on file   Tobacco Use     Smoking status: Never Smoker     Smokeless tobacco: Never Used   Substance and Sexual Activity     Alcohol use: Yes     Comment: beer once in a while     Drug use: No     Sexual activity: Not on file   Lifestyle     Physical activity     Days per week: Not on file     Minutes per session: Not on file     Stress: Not on file   Relationships     Social connections     Talks on phone: Not on file     Gets together: Not on file     Attends [employer] service: Not on file     Active member of club or organization: Not on file     Attends meetings of clubs or organizations: Not on file     Relationship status: Not on file     Intimate partner violence     Fear of current or ex partner: Not on file     Emotionally abused: Not on file     Physically abused: Not on file     Forced  sexual activity: Not on file   Other Topics Concern     Not on file   Social History Narrative     Not on file       Family History:  Family History   Problem Relation Age of Onset     Hypertension Mother      Diabetes Mother      Breast Cancer Mother      Cerebrovascular Disease Father          Physical Exam:  The patient's  weight is 98.9 kg (218 lb). His blood pressure is 187/100 (abnormal) and his pulse is 74. His respiration is 16 and oxygen saturation is 98%.      Repeat /98 mmHg, HR 71     Neurological Examination: He is alert and oriented, provides details of medical history.  Voice is strong, no voice tremor.  Extraocular movements intact in all direction of gaze, face is symmetric with rest and equal activation, tongue protrudes to midline with unremarkable alternate lateral movements.  Symmetric shoulder shrug.  There is no pronator drift.  There is right hand resting tremor.  There is symmetric upper extremity rigidity grade 2.    Motor (Performance) Sub-Scale 4/2/2021   Assessment Time 2:36 PM   Medication None   DBS - Right Brain None   DBS - Left Brain None   Head 0   Face & Jaw 0   Voice 0   Outstretched - RIGHT 2   Outstretched - LEFT 1.5   Wingbeating - RIGHT 1.5   Wingbeating - LEFT 1.5   Kinetic - RIGHT 1.5   Kinetic - LEFT 2   Lower Limb - RIGHT 0   Lower Limb - LEFT 0   Lower Limb (Max) 0   Spiral - RIGHT 3   Spiral - LEFT 3   Handwriting 4   Dot approx - RIGHT 1.5   Dot approx - LEFT 1.5   Trunk (Standing) 0   Total Right 9.5   Total Left 9.5   Axial 0   TOTAL 23     There is slight asymmetry of DTR on UE , R>L , not significant.  Lower extremity deep tendon reflexes are absent, no ankle jerks.  Gait: Mel unassisted with arms folded over the chest, normal stride length and pace, fairly symmetric bilateral arm swing.  He  took 2 steps during the pull test.      Impression:  Senthil Castillo is a 80 year old male with history of essential tremor since 64 years old.  The tremors is getting  progressively worse.  The goal of this visit was to explore other treatment options.    His exam is significant for a postural and action tremor.  He does have a right hand resting tremor which can be seen in some patients with essential tremor.  The duration of the right hand resting tremor for 8-9 years, without any other signs of parkinsonism favors Essential tremor. The phenomenology of action and resting tremor can be seen on some patients ( ET-PD ) .      Recommendations:     1. Your have Essential Tremor .    2. We discussed about the treatment options for the tremor: medications , deep brain stimulation and alternatives.     3. The other medication for tremor is Topiramate but will interfere with metformin .    4. Some people who have had Essential tremors for years may develop a resting tremor similar to the patients with Parkinson Disease and may respond to antiparkinsonian drugs in relieving the tremor.     5. Tremor does not interfere with basic activities of daily living .    6. Tremor interferes with your ability to enjoy the hobbies like wood working, car repair , other intricate things .    7. Other option is :  Arsenio trio ( wrist watch)  that can be worn to relieve the tremor .     8. Your conclusion at the end of the clinic visit was that you do not prefer deep brain stimulation surgery and it does not interfere with basic activities of daily living but mainly with your hobbies such as playing cards, fishing, working on old cars.      9. Your blood pressure today was high in clinic. Please, follow up with your primary care physician.    10. You can retry Primidone during the day, you were able to tolerate 200 mg a day . We do not t recommend taking more than 300 mg total a day.    Week 1: 50 mg daily  Week 2: 50 mg in the morning and 50 mg in the afternoon   Week 3: 100 mg in the morning and 50 mg in the afternoon  Week 4: 100 mg twice a day    If you decide to stop , do not stop abruptly  but  gradually : decrease by a half of a dose for one week and then by another half for another week and then can stop.    Patient seen and discussed with Dr. Jacksno.     Michelle Meneses MD   Movement Disorders Fellow     Patient seen and examined with Dr. Meneses and I agree with the assessment and plan as outlined.  79 yo ambidextrous man, DM, HTN, here for evaluation of bilateral hand tremor.  Onset around age 64, has worsened somewhat over that time, limited functional impact (handwriting) some impact on hobbies:  small engine repair (motorcycle, lawnmwer) fishing (baiting hook), social embarrasment (cardplaying).  Tried propanolol, years ago, doesn't recall results, but is currently on metoprolol, primarily for blood pressure.  Tried mysoline, stopped for falling out of bead, apparently active dreaming, broke a nighttable;  more recently, restarted it, dosed during the day, this time, and tolerated it up to a dose of 200 mg bid.  Not clear if there was a benefit:  recently, his wife noticed that his tremor was worse, and this turned out to have coincided with stoping the mysoline abuptly.  Strong family history of tremor, all on one side;  one nephew with autism, no developmental delay or early menopause.  On exam, no head or vocal tremor, no abnormal adventitious movements of the face, no posturing.  Prominent bilateral tremor of the hands, distal>proximal, fairly symmetrical, but there appeared to be an authentic resting tremor on the right, in contrast to a greater postural tremor on the left.  Spirals, and hanswriting samples (scanned) were moderately impaired.  Tone increased in both UEs, especially with reinforcement, but symmetrically, and no convincing asymmetry of Jonatan.  No retropulsion, and gait was unremarkable, although gait, and in particular pull-test did bring out more RUE tremor.  Impression:  Essential tremor.  He has tried two first line drugs for ET tremor, with limited, or no benefit.  I did not  "recommend topiramate, due to his metformin. 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  He, however, preferred to re-try the primidone.  I'm not inclined to try more than 300 mg/d, even given he's tolerated 200 mg/d, through some combination of concern about high dose of a sedating drug, at his age, and skepticism that a higher dose would yield any additional benefits.  We also discussed surgical (DBS) therapy, but he does not want to pursue evaluation for that, which I think is a very reasonable decision, considering his surgical risk factors (age, diabetes, hypertension) and the very limited impact of the tremor on his life.    Reagan Jackson PhD, MD                  "

## 2021-04-02 NOTE — PATIENT INSTRUCTIONS
1. Your have Essential Tremor .    2. We discussed about the treatment options for the tremor: medications , deep brain stimulation and alternatives.     3. The other medication for tremor is Topiramate but will interfere with metformin .    4. Some people who have had Essential tremors for years may develop a resting tremor similar to the patients with Parkinson Disease and may respond to antiparkinsonian drugs in relieving the tremor.     5. Tremor does not interfere with basic activities of daily living .    6. Tremor interferes with your ability to enjoy the hobbies like wood working, car repair , other intricate things .    7. Other option is :  Arsenio trio ( wrist watch)  that can be worn to relieve the tremor .     8. Your conclusion at the end of the clinic visit was that you do not prefer deep brain stimulation surgery and it does not interfere with basic activities of daily living but mainly with your hobbies such as playing cards, fishing, working on old cars.      9. Your blood pressure today was high in clinic. Please, follow up with your primary care physician.    10. You can retry Primidone during the day, you were able to tolerate 200 mg a day . We do not t recommend taking more than 300 mg total a day.    Week 1: 50 mg daily  Week 2: 50 mg in the morning and 50 mg in the afternoon   Week 3: 100 mg in the morning and 50 mg in the afternoon  Week 4: 100 mg twice a day    If you decide to stop , do not stop abruptly  but gradually : decrease by a half of a dose for one week ( 100 mg daily)  and then by another half for another week ( 50 mg daily)  and then can stop.    11. Please, send us a My chart message about your medication regimen and tremor.

## 2021-04-26 ENCOUNTER — TRANSFERRED RECORDS (OUTPATIENT)
Dept: HEALTH INFORMATION MANAGEMENT | Facility: CLINIC | Age: 81
End: 2021-04-26

## 2021-04-26 LAB — RETINOPATHY: NEGATIVE

## 2021-05-23 ENCOUNTER — HEALTH MAINTENANCE LETTER (OUTPATIENT)
Age: 81
End: 2021-05-23

## 2021-05-25 ENCOUNTER — RECORDS - HEALTHEAST (OUTPATIENT)
Dept: ADMINISTRATIVE | Facility: CLINIC | Age: 81
End: 2021-05-25

## 2021-06-03 DIAGNOSIS — Z79.4 TYPE 2 DIABETES MELLITUS WITH STAGE 3 CHRONIC KIDNEY DISEASE, WITH LONG-TERM CURRENT USE OF INSULIN (H): ICD-10-CM

## 2021-06-03 DIAGNOSIS — N18.30 TYPE 2 DIABETES MELLITUS WITH STAGE 3 CHRONIC KIDNEY DISEASE, WITH LONG-TERM CURRENT USE OF INSULIN (H): ICD-10-CM

## 2021-06-03 DIAGNOSIS — E11.22 TYPE 2 DIABETES MELLITUS WITH STAGE 3 CHRONIC KIDNEY DISEASE, WITH LONG-TERM CURRENT USE OF INSULIN (H): ICD-10-CM

## 2021-06-03 DIAGNOSIS — R25.2 CRAMP OF LIMB: ICD-10-CM

## 2021-06-03 DIAGNOSIS — N18.30 CHRONIC KIDNEY DISEASE, STAGE III (MODERATE) (H): ICD-10-CM

## 2021-06-03 LAB
BUN SERPL-MCNC: 36.2 MG/DL (ref 7–21)
CALCIUM SERPL-MCNC: 9.2 MG/DL (ref 8.5–10.1)
CHLORIDE SERPLBLD-SCNC: 105.3 MMOL/L (ref 98–110)
CO2 SERPL-SCNC: 27.2 MMOL/L (ref 20–32)
CREAT SERPL-MCNC: 1.8 MG/DL (ref 0.7–1.3)
GFR SERPL CREATININE-BSD FRML MDRD: 39.8 ML/MIN/1.7 M2
GLUCOSE SERPL-MCNC: 133 MG'DL (ref 70–99)
HBA1C MFR BLD: 8.5 % (ref 4.1–5.7)
MAGNESIUM SERPL-MCNC: 1.5 MG/DL (ref 1.8–2.6)
POTASSIUM SERPL-SCNC: 5.6 MMOL/L (ref 3.2–4.6)
SODIUM SERPL-SCNC: 139.8 MMOL/L (ref 132–142)

## 2021-06-03 PROCEDURE — 80048 BASIC METABOLIC PNL TOTAL CA: CPT

## 2021-06-03 PROCEDURE — 83036 HEMOGLOBIN GLYCOSYLATED A1C: CPT

## 2021-06-03 PROCEDURE — 36415 COLL VENOUS BLD VENIPUNCTURE: CPT

## 2021-06-07 ENCOUNTER — OFFICE VISIT (OUTPATIENT)
Dept: FAMILY MEDICINE | Facility: CLINIC | Age: 81
End: 2021-06-07
Payer: COMMERCIAL

## 2021-06-07 VITALS
TEMPERATURE: 98.6 F | DIASTOLIC BLOOD PRESSURE: 74 MMHG | SYSTOLIC BLOOD PRESSURE: 136 MMHG | OXYGEN SATURATION: 97 % | RESPIRATION RATE: 12 BRPM | HEART RATE: 65 BPM | BODY MASS INDEX: 32.75 KG/M2 | WEIGHT: 225 LBS

## 2021-06-07 DIAGNOSIS — N18.32 STAGE 3B CHRONIC KIDNEY DISEASE (H): Primary | ICD-10-CM

## 2021-06-07 DIAGNOSIS — I10 ESSENTIAL HYPERTENSION: ICD-10-CM

## 2021-06-07 DIAGNOSIS — R80.1 PERSISTENT PROTEINURIA: ICD-10-CM

## 2021-06-07 DIAGNOSIS — N18.31 TYPE 2 DIABETES MELLITUS WITH STAGE 3A CHRONIC KIDNEY DISEASE, WITH LONG-TERM CURRENT USE OF INSULIN (H): ICD-10-CM

## 2021-06-07 DIAGNOSIS — Z79.4 TYPE 2 DIABETES MELLITUS WITH STAGE 3A CHRONIC KIDNEY DISEASE, WITH LONG-TERM CURRENT USE OF INSULIN (H): ICD-10-CM

## 2021-06-07 DIAGNOSIS — G25.0 BENIGN ESSENTIAL TREMOR: ICD-10-CM

## 2021-06-07 DIAGNOSIS — R53.83 FATIGUE, UNSPECIFIED TYPE: ICD-10-CM

## 2021-06-07 DIAGNOSIS — E83.42 HYPOMAGNESEMIA: ICD-10-CM

## 2021-06-07 DIAGNOSIS — E11.22 TYPE 2 DIABETES MELLITUS WITH STAGE 3A CHRONIC KIDNEY DISEASE, WITH LONG-TERM CURRENT USE OF INSULIN (H): ICD-10-CM

## 2021-06-07 LAB — TSH SERPL DL<=0.05 MIU/L-ACNC: 4.95 UIU/ML (ref 0.3–5)

## 2021-06-07 PROCEDURE — 99215 OFFICE O/P EST HI 40 MIN: CPT | Performed by: FAMILY MEDICINE

## 2021-06-07 PROCEDURE — 36415 COLL VENOUS BLD VENIPUNCTURE: CPT | Performed by: FAMILY MEDICINE

## 2021-06-07 RX ORDER — LISINOPRIL 40 MG/1
40 TABLET ORAL DAILY
Qty: 90 TABLET | Refills: 1 | Status: SHIPPED | OUTPATIENT
Start: 2021-06-07 | End: 2021-12-20

## 2021-06-07 RX ORDER — PRIMIDONE 50 MG/1
TABLET ORAL
Qty: 0.1 TABLET | Refills: 0 | Status: SHIPPED | OUTPATIENT
Start: 2021-06-07 | End: 2021-06-07

## 2021-06-07 RX ORDER — AMLODIPINE BESYLATE 10 MG/1
10 TABLET ORAL DAILY
Qty: 90 TABLET | Refills: 1 | Status: SHIPPED | OUTPATIENT
Start: 2021-06-07 | End: 2021-12-20

## 2021-06-07 NOTE — PROGRESS NOTES
[facility] [address] [place] [address]  Phone: [phone]  Fax: [phone]  Primary Provider: Mo Lovett    PREOPERATIVE EVALUATION:  Today's date: 6/7/2021    Senthil Castillo is a 80 year old male who presents for a preoperative evaluation.    Surgical Information:  Surgery/Procedure: Cataract extraction  Surgery Location: [facility]  Surgeon: Dr. Rivera  Surgery Date: 6/23/2021  Time of Surgery:   Where patient plans to recover: At home with family  Fax number for surgical facility:     Type of Anesthesia Anticipated: Local    Subjective     HPI related to upcoming procedure: Decreased vision in both eyes    Preop Questions 6/7/2021   1. Have you ever had a heart attack or stroke? No   2. Have you ever had surgery on your heart or blood vessels, such as a stent placement, a coronary artery bypass, or surgery on an artery in your head, neck, heart, or legs? No   3. Do you have chest pain with activity? No   4. Do you have a history of  heart failure? No   5. Do you currently have a cold, bronchitis or symptoms of other infection? No   6. Do you have a cough, shortness of breath, or wheezing? No   7. Do you or anyone in your family have previous history of blood clots? No   8. Do you or does anyone in your family have a serious bleeding problem such as prolonged bleeding following surgeries or cuts? No   9. Have you ever had problems with anemia or been told to take iron pills? No   10. Have you had any abnormal blood loss such as black, tarry or bloody stools? No   11. Have you ever had a blood transfusion? No   12. Are you willing to have a blood transfusion if it is medically needed before, during, or after your surgery? Yes   13. Have you or any of your relatives ever had problems with anesthesia? No   14. Do you have sleep apnea, excessive snoring or daytime drowsiness? No   15. Do you have any artifical heart valves or other implanted medical devices like a pacemaker,  defibrillator, or continuous glucose monitor? No   16. Do you have artificial joints? No   17. Are you allergic to latex? No       Health Care Directive:  Patient does not have a Health Care Directive or Living Will: Discussed advance care planning with patient; information given to patient to review.    Preoperative Review of :   reviewed - no record of controlled substances prescribed.    Status of Chronic Conditions:  DIABETES - Patient has a longstanding history of DiabetesType Type II . Patient is being treated with oral agents, SMBG and insulin injections and denies significant side effects. Control has been fair. Complicating factors include but are not limited to: hypertension, hyperlipidemia and chronic kidney disease.     HYPERTENSION - Patient has longstanding history of HTN , currently denies any symptoms referable to elevated blood pressure. Specifically denies chest pain, palpitations, dyspnea, orthopnea, PND or peripheral edema. Blood pressure readings have been in normal range AT HOME - occasional elevations in clinic. Current medication regimen is as listed below. Patient denies any side effects of medication.     RENAL INSUFFICIENCY - Patient has a longstanding history of moderate-severe chronic renal insufficiency. Last Cr 1.8.     Review of Systems  CONSTITUTIONAL: NEGATIVE for fever, chills, change in weight  ENT/MOUTH: NEGATIVE for ear, mouth and throat problems  RESP: NEGATIVE for significant cough or SOB  CV: NEGATIVE for chest pain, palpitations or peripheral edema    Patient Active Problem List    Diagnosis Date Noted     Benign essential tremor 01/19/2017     Priority: Medium     Type 2 diabetes mellitus with diabetic chronic kidney disease (H) 04/25/2016     Priority: Anam Ndiaye has been counseled many times that he could improve his diabetes control by switching to a better insulin regimen but consistently has declined making a change - will not continue to address unless he  expresses a desire to change.  DP 4/25/16        History of colonic polyps 05/05/2014     Priority: Medium     Colonoscopy q 3 years       Premature beats 10/28/2013     Priority: Medium     Problem list name updated by automated process. Provider to review       Chronic kidney disease, stage III (moderate) 03/29/2013     Priority: Medium     Coronary atherosclerosis 03/29/2013     Priority: Medium     Problem list name updated by automated process. Provider to review       Pure hypercholesterolemia 03/29/2013     Priority: Medium     Essential hypertension 03/29/2013     Priority: Medium     Problem list name updated by automated process. Provider to review       Proteinuria 03/29/2013     Priority: Medium     Calculus of kidney 03/29/2013     Priority: Medium     Inguinal hernia 03/29/2013     Priority: Medium     Right sided only        History reviewed. No pertinent past medical history.  History reviewed. No pertinent surgical history.  Current Outpatient Medications   Medication Sig Dispense Refill     amLODIPine (NORVASC) 5 MG tablet Take 1 tablet (5 mg) by mouth daily 90 tablet 1     aspirin (ASA) 325 MG tablet Take 1 tablet (325 mg) by mouth every other day 90 tablet 1     atorvastatin (LIPITOR) 40 MG tablet Take 1 tablet (40 mg) by mouth daily 90 tablet 1     empagliflozin (JARDIANCE) 10 MG TABS tablet Take 1 tablet (10 mg) by mouth daily 90 tablet 1     insulin NPH-insulin regular MIX (NOVOLIN MIX VIAL) (70-30) 100 UNIT/ML Take 20 u morning and 30 u evening       lisinopril (ZESTRIL) 40 MG tablet Take 1 tablet (40 mg) by mouth 2 times daily 180 tablet 1     metFORMIN (GLUCOPHAGE) 1000 MG tablet Take 1 tablet (1,000 mg) by mouth 2 times daily (with meals) 180 tablet 1     metoprolol succinate ER (TOPROL-XL) 100 MG 24 hr tablet TAKE 2 TABLETS BY MOUTH IN THE MORNING AND 1 TABLET AT NIGHT 270 tablet 1     blood glucose (ONETOUCH ULTRA) test strip Use up to 3 times daily 300 strip 1     Blood Pressure  Monitoring (SPHYGMOMANOMETER) MISC Use daily as needed (Patient not taking: Reported on 4/2/2021) 1 each 0     Blood Pressure Monitoring (SPHYGMOMANOMETER) MISC 1 Device daily as needed (Patient not taking: Reported on 4/2/2021) 1 each 0     insulin pen needle (BD ULTRA-FINE) 29G X 12.7MM miscellaneous Use twice daily 180 each 1     primidone (MYSOLINE) 50 MG tablet Take 2 tablets (100 mg) by mouth 2 times daily (Patient not taking: Reported on 4/2/2021) 360 tablet 1       Allergies   Allergen Reactions     Nkda [No Known Drug Allergies]         Social History     Tobacco Use     Smoking status: Never Smoker     Smokeless tobacco: Never Used   Substance Use Topics     Alcohol use: Yes     Comment: beer once in a while       History   Drug Use No         Objective   /74 (BP Location: Left arm, Patient Position: Sitting)   Pulse 65   Temp 98.6  F (37  C) (Oral)   Resp 12   Wt 102.1 kg (225 lb)   SpO2 97%   BMI 32.75 kg/m      Physical Exam  GENERAL APPEARANCE: healthy, alert and no distress  HENT: ear canals and TM's normal and nose and mouth without ulcers or lesions  RESP: lungs clear to auscultation - no rales, rhonchi or wheezes  CV: regular rate and rhythm, normal S1 S2, no S3 or S4 and no murmur, click or rub   ABDOMEN: soft, nontender, no HSM or masses and bowel sounds normal  NEURO: Normal strength and tone, sensory exam grossly normal, mentation intact and speech normal    Recent Labs   Lab Test 06/03/21  0848 01/13/21  1426 06/15/20  0823   HGB  --  11.9* 11.8*   .8 138.2 140   POTASSIUM 5.6* 4.3 5.5*   CR 1.8* 1.5* 1.60*   A1C 8.5* 8.7* 8.6*        Diagnostics:  Recent Results (from the past 168 hour(s))   Basic Metabolic Panel ([facility])    Collection Time: 06/03/21  8:48 AM   Result Value Ref Range    Urea Nitrogen 36.2 (H) 7.0 - 21.0 mg/dL    Calcium 9.2 8.5 - 10.1 mg/dL    Chloride 105.3 98.0 - 110.0 mmol/L    Carbon Dioxide 27.2 20.0 - 32.0 mmol/L    Creatinine 1.8 (H) 0.7 - 1.3 mg/dL     Glucose 133.0 (H) 70.0 - 99.0 mg'dL    Potassium 5.6 (H) 3.2 - 4.6 mmol/L    Sodium 139.8 132.0 - 142.0 mmol/L    GFR Estimate 39.8 (L) >60.0 mL/min/1.7 m2   Hemoglobin A1c ([facility])    Collection Time: 06/03/21  8:48 AM   Result Value Ref Range    Hemoglobin A1C 8.5 (H) 4.1 - 5.7 %   Magnesium ([facility])    Collection Time: 06/03/21  8:48 AM   Result Value Ref Range    Magnesium 1.5 (L) 1.8 - 2.6 mg/dL      No EKG required for low risk surgery (cataract, skin procedure, breast biopsy, etc).    Revised Cardiac Risk Index (RCRI):  The patient has the following serious cardiovascular risks for perioperative complications:   - Diabetes Mellitus (on Insulin) = 1 point     RCRI Interpretation: 1 point: Class II (low risk - 0.9% complication rate)    Senthil was seen today for pre-op exam, other and medication reconciliation.    Diagnoses and all orders for this visit:    Stage 3b chronic kidney disease  -     CBC with Plt (P )    Persistent proteinuria  -     lisinopril (ZESTRIL) 40 MG tablet; Take 1 tablet (40 mg) by mouth daily    Type 2 diabetes mellitus with stage 3a chronic kidney disease, with long-term current use of insulin (H)  -     lisinopril (ZESTRIL) 40 MG tablet; Take 1 tablet (40 mg) by mouth daily  -     metFORMIN (GLUCOPHAGE) 500 MG tablet; Take 1 tablet (500 mg) by mouth 2 times daily (with meals)    Essential hypertension  -     lisinopril (ZESTRIL) 40 MG tablet; Take 1 tablet (40 mg) by mouth daily  -     amLODIPine (NORVASC) 10 MG tablet; Take 1 tablet (10 mg) by mouth daily    Benign essential tremor  -     Discontinue: primidone (MYSOLINE) 50 MG tablet; discontinued    Fatigue, unspecified type  -     Thyroid [place] ([facility])    Hypomagnesemia  -     magnesium chloride 535 (64 Mg) MG TBEC CR tablet; Take 1 tablet (535 mg) by mouth daily    Patient is CLEARED for surgery without further evaluation or work up.    Patient has elevated K but is taking several K rich foods and excess dose of  Lisinopril - will discontinue one Lisinopril daily back to recommended dosing.  To ensure BP management continues, will increase Amlodipine in place.  Patient given list of K rich foods to limit.    Low Mag - add supplement.    Worsening GFR - will decrease Metformin dose, may need to increase insulin dosing.  Will refer to Nephrology for evaluation.       Signed Electronically by: Mo Lovett MD  Copy of this evaluation report is provided to requesting physician.

## 2021-06-07 NOTE — PATIENT INSTRUCTIONS
[email or web address] - wrist watch for tremor    06/10/21   NEPHROLOGY REFERRAL    [facility]  Phone: [phone]  Fax: [phone]    Referral, demographics, labs, office note and medication list faxed to [phone].     Meka Sweeney

## 2021-06-07 NOTE — LETTER
"June 8, 2021      Senthil MELLY Anna  [address]  [place] [address]        Dear ,    We are writing to inform you of your test results.    Hi Kip and Yaneth - your thyroid function is normal.  I was not able to \"add on\" the hemoglobin to labs drawn a few days earlier - so that should get checked since if you are anemic that will cause tiredness.  For sure it will get checked by the kidney specialist so if that visit is happening soon, it can wait till then.   If not, let me know and I can put in order.     Other question is whether you snore or could possibly have sleep apnea (episodes of not breathing when you sleep)?  Yaneth, let me know if that is a possible concern!  Take care.    Resulted Orders   Thyroid [place] ([facility])   Result Value Ref Range    TSH 4.95 0.30 - 5.00 uIU/mL    Narrative    Test performed by:  [facility]  [address] [place] [address]       If you have any questions or concerns, please call the clinic at the number listed above.       Sincerely,      Mo Lovett MD          "

## 2021-06-08 NOTE — RESULT ENCOUNTER NOTE
"Jose Guadalupe Shin and Yaneth - your thyroid function is normal.  I was not able to \"add on\" the hemoglobin to labs drawn a few days earlier - so that should get checked since if you are anemic that will cause tiredness.  For sure it will get checked by the kidney specialist so if that visit is happening soon, it can wait till then.   If not, let me know and I can put in order.    Other question is whether you snore or could possibly have sleep apnea (episodes of not breathing when you sleep)?  Yaneth, let me know if that is a possible concern!  Take care, Dr Mo Lovett"

## 2021-06-24 ENCOUNTER — TELEPHONE (OUTPATIENT)
Dept: FAMILY MEDICINE | Facility: CLINIC | Age: 81
End: 2021-06-24

## 2021-06-24 NOTE — TELEPHONE ENCOUNTER
Office note addended per request. Patient has no surgical history on file. Faxed to surgical center. Left message with Dot communicating this information. ./LR

## 2021-06-24 NOTE — TELEPHONE ENCOUNTER
[facility] phone call message- general phone call:    Reason for call: On pre op form, pt is not cleared for surgery. It also states that there is no perinate prior procedures.  Could you list the prior procedures.  Biggest one they need is to clear the patient for surgery. Please fax back to them at [phone].    Return call needed: No    OK to leave a message on voice mail? No    Primary language: English      needed? No    Call taken on June 24, 2021 at 10:45 AM by Ashley Jaimes

## 2021-07-29 DIAGNOSIS — N18.31 TYPE 2 DIABETES MELLITUS WITH STAGE 3A CHRONIC KIDNEY DISEASE, WITH LONG-TERM CURRENT USE OF INSULIN (H): ICD-10-CM

## 2021-07-29 DIAGNOSIS — E11.22 TYPE 2 DIABETES MELLITUS WITH STAGE 3A CHRONIC KIDNEY DISEASE, WITH LONG-TERM CURRENT USE OF INSULIN (H): ICD-10-CM

## 2021-07-29 DIAGNOSIS — Z79.4 TYPE 2 DIABETES MELLITUS WITH STAGE 3A CHRONIC KIDNEY DISEASE, WITH LONG-TERM CURRENT USE OF INSULIN (H): ICD-10-CM

## 2021-07-29 DIAGNOSIS — I10 ESSENTIAL HYPERTENSION: ICD-10-CM

## 2021-07-29 RX ORDER — ATORVASTATIN CALCIUM 40 MG/1
TABLET, FILM COATED ORAL
Qty: 90 TABLET | Refills: 1 | Status: SHIPPED | OUTPATIENT
Start: 2021-07-29 | End: 2021-12-20

## 2021-07-29 RX ORDER — AMLODIPINE BESYLATE 5 MG/1
TABLET ORAL
Qty: 0.1 TABLET | Refills: 0 | Status: SHIPPED | OUTPATIENT
Start: 2021-07-29 | End: 2021-10-01

## 2021-09-12 ENCOUNTER — HEALTH MAINTENANCE LETTER (OUTPATIENT)
Age: 81
End: 2021-09-12

## 2021-09-14 DIAGNOSIS — I12.9 RENAL HYPERTENSION: ICD-10-CM

## 2021-09-14 RX ORDER — METOPROLOL SUCCINATE 100 MG/1
TABLET, EXTENDED RELEASE ORAL
Qty: 270 TABLET | Refills: 0 | Status: SHIPPED | OUTPATIENT
Start: 2021-09-14 | End: 2021-12-09

## 2021-09-27 DIAGNOSIS — E11.22 TYPE 2 DIABETES MELLITUS WITH STAGE 3B CHRONIC KIDNEY DISEASE, WITH LONG-TERM CURRENT USE OF INSULIN (H): ICD-10-CM

## 2021-09-27 DIAGNOSIS — Z79.4 TYPE 2 DIABETES MELLITUS WITH STAGE 3B CHRONIC KIDNEY DISEASE, WITH LONG-TERM CURRENT USE OF INSULIN (H): ICD-10-CM

## 2021-09-27 DIAGNOSIS — N18.32 TYPE 2 DIABETES MELLITUS WITH STAGE 3B CHRONIC KIDNEY DISEASE, WITH LONG-TERM CURRENT USE OF INSULIN (H): ICD-10-CM

## 2021-09-27 DIAGNOSIS — N18.32 STAGE 3B CHRONIC KIDNEY DISEASE (H): Primary | ICD-10-CM

## 2021-09-27 DIAGNOSIS — I70.90 UNSPECIFIED ATHEROSCLEROSIS: ICD-10-CM

## 2021-09-29 ENCOUNTER — LAB (OUTPATIENT)
Dept: LAB | Facility: CLINIC | Age: 81
End: 2021-09-29
Payer: COMMERCIAL

## 2021-09-29 DIAGNOSIS — N18.32 TYPE 2 DIABETES MELLITUS WITH STAGE 3B CHRONIC KIDNEY DISEASE, WITH LONG-TERM CURRENT USE OF INSULIN (H): ICD-10-CM

## 2021-09-29 DIAGNOSIS — I70.90 UNSPECIFIED ATHEROSCLEROSIS: ICD-10-CM

## 2021-09-29 DIAGNOSIS — E11.22 TYPE 2 DIABETES MELLITUS WITH STAGE 3B CHRONIC KIDNEY DISEASE, WITH LONG-TERM CURRENT USE OF INSULIN (H): ICD-10-CM

## 2021-09-29 DIAGNOSIS — N18.32 STAGE 3B CHRONIC KIDNEY DISEASE (H): ICD-10-CM

## 2021-09-29 DIAGNOSIS — Z79.4 TYPE 2 DIABETES MELLITUS WITH STAGE 3B CHRONIC KIDNEY DISEASE, WITH LONG-TERM CURRENT USE OF INSULIN (H): ICD-10-CM

## 2021-09-29 LAB
ANION GAP SERPL CALCULATED.3IONS-SCNC: 11 MMOL/L (ref 5–18)
BUN SERPL-MCNC: 32 MG/DL (ref 8–28)
CALCIUM SERPL-MCNC: 9.2 MG/DL (ref 8.5–10.5)
CHLORIDE BLD-SCNC: 105 MMOL/L (ref 98–107)
CHOLEST SERPL-MCNC: 141 MG/DL
CO2 SERPL-SCNC: 23 MMOL/L (ref 22–31)
CREAT SERPL-MCNC: 1.8 MG/DL (ref 0.7–1.3)
ERYTHROCYTE [DISTWIDTH] IN BLOOD BY AUTOMATED COUNT: 12.4 % (ref 10–15)
FASTING STATUS PATIENT QL REPORTED: ABNORMAL
GFR SERPL CREATININE-BSD FRML MDRD: 35 ML/MIN/1.73M2
GLUCOSE BLD-MCNC: 240 MG/DL (ref 70–125)
HBA1C MFR BLD: 8 % (ref 0–5.6)
HCT VFR BLD AUTO: 33.6 % (ref 40–53)
HDLC SERPL-MCNC: 42 MG/DL
HGB BLD-MCNC: 11 G/DL (ref 13.3–17.7)
LDLC SERPL CALC-MCNC: 59 MG/DL
MCH RBC QN AUTO: 30.6 PG (ref 26.5–33)
MCHC RBC AUTO-ENTMCNC: 32.7 G/DL (ref 31.5–36.5)
MCV RBC AUTO: 93 FL (ref 78–100)
PLATELET # BLD AUTO: 279 10E3/UL (ref 150–450)
POTASSIUM BLD-SCNC: 5.1 MMOL/L (ref 3.5–5)
PTH-INTACT SERPL-MCNC: 79 PG/ML (ref 10–86)
RBC # BLD AUTO: 3.6 10E6/UL (ref 4.4–5.9)
SODIUM SERPL-SCNC: 139 MMOL/L (ref 136–145)
TRIGL SERPL-MCNC: 202 MG/DL
WBC # BLD AUTO: 6.1 10E3/UL (ref 4–11)

## 2021-09-29 PROCEDURE — 85027 COMPLETE CBC AUTOMATED: CPT

## 2021-09-29 PROCEDURE — 80061 LIPID PANEL: CPT

## 2021-09-29 PROCEDURE — 36415 COLL VENOUS BLD VENIPUNCTURE: CPT

## 2021-09-29 PROCEDURE — 83970 ASSAY OF PARATHORMONE: CPT

## 2021-09-29 PROCEDURE — 80048 BASIC METABOLIC PNL TOTAL CA: CPT

## 2021-09-29 PROCEDURE — 83036 HEMOGLOBIN GLYCOSYLATED A1C: CPT

## 2021-10-01 ENCOUNTER — OFFICE VISIT (OUTPATIENT)
Dept: FAMILY MEDICINE | Facility: CLINIC | Age: 81
End: 2021-10-01
Payer: COMMERCIAL

## 2021-10-01 VITALS
SYSTOLIC BLOOD PRESSURE: 135 MMHG | HEART RATE: 65 BPM | WEIGHT: 228.8 LBS | RESPIRATION RATE: 16 BRPM | BODY MASS INDEX: 33.3 KG/M2 | DIASTOLIC BLOOD PRESSURE: 72 MMHG | OXYGEN SATURATION: 97 % | TEMPERATURE: 98.5 F

## 2021-10-01 DIAGNOSIS — N18.32 STAGE 3B CHRONIC KIDNEY DISEASE (H): ICD-10-CM

## 2021-10-01 DIAGNOSIS — E11.22 TYPE 2 DIABETES MELLITUS WITH STAGE 3B CHRONIC KIDNEY DISEASE, WITH LONG-TERM CURRENT USE OF INSULIN (H): Primary | ICD-10-CM

## 2021-10-01 DIAGNOSIS — Z79.4 TYPE 2 DIABETES MELLITUS WITH STAGE 3B CHRONIC KIDNEY DISEASE, WITH LONG-TERM CURRENT USE OF INSULIN (H): Primary | ICD-10-CM

## 2021-10-01 DIAGNOSIS — N18.32 TYPE 2 DIABETES MELLITUS WITH STAGE 3B CHRONIC KIDNEY DISEASE, WITH LONG-TERM CURRENT USE OF INSULIN (H): Primary | ICD-10-CM

## 2021-10-01 DIAGNOSIS — G25.0 BENIGN ESSENTIAL TREMOR: ICD-10-CM

## 2021-10-01 PROCEDURE — 91300 PR COVID VAC PFIZER DIL RECON 30 MCG/0.3 ML IM: CPT | Performed by: FAMILY MEDICINE

## 2021-10-01 PROCEDURE — 99214 OFFICE O/P EST MOD 30 MIN: CPT | Mod: 25 | Performed by: FAMILY MEDICINE

## 2021-10-01 PROCEDURE — 0004A PR COVID VAC PFIZER DIL RECON 30 MCG/0.3 ML IM: CPT | Performed by: FAMILY MEDICINE

## 2021-10-01 RX ORDER — PIOGLITAZONEHYDROCHLORIDE 15 MG/1
15 TABLET ORAL DAILY
Qty: 30 TABLET | Refills: 3 | Status: SHIPPED | OUTPATIENT
Start: 2021-10-01 | End: 2021-10-21

## 2021-10-01 NOTE — PROGRESS NOTES
Senthil was seen today for diabetes.    Diagnoses and all orders for this visit:    Type 2 diabetes mellitus with stage 3b chronic kidney disease, with long-term current use of insulin (H)  -     Discontinue: pioglitazone (ACTOS) 15 MG tablet; Take 1 tablet (15 mg) by mouth daily    Benign essential tremor    Stage 3b chronic kidney disease (H)    Other orders  -     MS COVID VAC PFIZER DIL RECON 30 MCG/0.3 ML IM      Compared to 3 months ago, his A1c has improved and his serum creatinine has remained the same.  His GFR slightly declined.  His hemoglobin is stable at 11.0 and his PTH is normal.  He has had proteinuria for many years.  I had referred him to nephrology however due to some miscommunications the appointment never got scheduled.  Today he reports a preference for tracking his labs again in about 3 months time.  He agrees to go to see nephrology if his kidney function declines.    We spent several minutes discussing the risk of ultimately needing to start dialysis.  He is ambivalent about this at this time although his wife seems more interested in that possibility.  He articulates that he feels like he has lived a good life thus far.    I spent several minutes advising him that tighter blood sugar control and blood pressure control can help slow the progression of chronic kidney disease.  Although he has been consistently reluctant to consider switching away from his mixed insulin dosing which he purchases, he agrees to consider additional hypoglycemic agents that would be affordable through his health insurance.    He does not trust his home sphygmomanometer and I therefore gave him another meter for him to use.  After the encounter, I discussed his hypoglycemic agent options with our clinic pharmacist and followed up with him by phone with recommendations after today's appointment.  Basically looking at the preferred options in his [facility] Medicare part D formulary, Pioglitazone seems the preferred class  of agent he is not currently taking.  I indicated I will send a prescription for this to his pharmacy.  He needs to consider if cost or effectiveness in slowing progression of CKD are most important to him.  There are many other more effective options but these are more costly.  Either an SGLT2 inhibitor or GLP1 agonist would be preferred.  Another alternative is to explore other insurance plans that may have a higher premium but will have lower copay costs.  I've asked them to let me know their thoughts.      Total visit time with patient was 25 mins, all of which was face to face MD time, and over 50% of this time was spent in counseling and coordination of care, including post-encounter discussion with pharmacist and patient on today's date.  Including post-encounter documentation and orders, total encounter time was 32 mins.        Subjective:  This is a 81-year-old who attends today with his wife in follow-up to diabetes type 2 and chronic kidney disease.  He attended for labs to be drawn a few days ago which we will review in detail with him today.  For many years he has insisted on purchasing a mixed 70/30 insulin over-the-counter.  He continues to report that he experiences occasional early morning hypoglycemic episodes.  He can tell these by waking up in a cold sweat and knows he needs to consume some calories.  I have discussed preferred insulin regimens with him multiple times over many years and he resists switching.  I had attempted to prescribe Jardiance a few months ago however when he went to the pharmacy they told him it would cost $400.  At last visit we reduced his Metformin dose based on his decreased GFR.  In addition due to hyperkalemia I reduced his lisinopril dose to 40 mg daily from a twice daily dose previously prescribed by a nephrologist.    He has no other concerns today that he wishes to discuss.    ROS:  Neuro: He continues to have benign essential tremor.  He attended a neurologist  who recommended increasing the dose of primidone.  He says he went up to 4 tablets daily and felt that it impaired his cognition but did not significantly improve his tremor and therefore decided to quit taking it entirely.    Objective:  /72   Pulse 65   Temp 98.5  F (36.9  C)   Resp 16   Wt 103.8 kg (228 lb 12.8 oz)   SpO2 97%   BMI 33.30 kg/m    His vitals reveal a satisfactory blood pressure and that he is in an obese weight range.    We spent the entire visit discussing his recent lab results.  He denies any numbness or tingling in his feet and attends for an annual eye exam.

## 2021-10-20 ENCOUNTER — MYC MEDICAL ADVICE (OUTPATIENT)
Dept: PHARMACY | Facility: CLINIC | Age: 81
End: 2021-10-20

## 2021-10-20 NOTE — TELEPHONE ENCOUNTER
Called [facility] to run a test claim for Victoza (one of the few options given renal function)--came back as $282 for 30 day supply.     From a cost-effective standpoint could consider renally-adjusted, low-dose glipizide XL 2.5 mg once daily with close monitoring for hypoglycemia. Also could consider a slight increase in insulin NPH. Unfortunately, those are our only options from a cost standpoint at this time.    However, given patient's age and insulin use status I am okay with his A1C at 8% (estimated average blood glucose around 180 mg/dL)--would much rather patient be in the upper 100s than lower 100s given risk of hypoglycemia.    Hay Conner, PharmD, [facility]

## 2021-10-21 NOTE — TELEPHONE ENCOUNTER
I have verified the content of the note, which accurately reflects my assessment of the patient and the plan of care.   Evangelina Medina, [facility], PharmD

## 2021-10-28 ENCOUNTER — APPOINTMENT (OUTPATIENT)
Dept: CT IMAGING | Facility: HOSPITAL | Age: 81
DRG: 200 | End: 2021-10-28
Payer: COMMERCIAL

## 2021-10-28 ENCOUNTER — HOSPITAL ENCOUNTER (INPATIENT)
Facility: HOSPITAL | Age: 81
LOS: 3 days | Discharge: HOME | DRG: 200 | End: 2021-10-31
Attending: EMERGENCY MEDICINE | Admitting: FAMILY MEDICINE
Payer: COMMERCIAL

## 2021-10-28 ENCOUNTER — APPOINTMENT (OUTPATIENT)
Dept: RADIOLOGY | Facility: HOSPITAL | Age: 81
DRG: 200 | End: 2021-10-28
Attending: EMERGENCY MEDICINE
Payer: COMMERCIAL

## 2021-10-28 ENCOUNTER — APPOINTMENT (OUTPATIENT)
Dept: ULTRASOUND IMAGING | Facility: HOSPITAL | Age: 81
DRG: 200 | End: 2021-10-28
Payer: COMMERCIAL

## 2021-10-28 DIAGNOSIS — S42.034A CLOSED NONDISPLACED FRACTURE OF ACROMIAL END OF RIGHT CLAVICLE, INITIAL ENCOUNTER: ICD-10-CM
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
## 2021-10-28 DIAGNOSIS — S22.41XA CLOSED FRACTURE OF MULTIPLE RIBS OF RIGHT SIDE, INITIAL ENCOUNTER: ICD-10-CM

## 2021-10-28 DIAGNOSIS — S27.0XXA TRAUMATIC PNEUMOTHORAX, INITIAL ENCOUNTER: ICD-10-CM

## 2021-10-28 LAB
ANION GAP SERPL CALCULATED.3IONS-SCNC: 9 MMOL/L (ref 5–18)
APTT PPP: 29 SECONDS (ref 22–38)
BUN SERPL-MCNC: 26 MG/DL (ref 8–28)
CALCIUM SERPL-MCNC: 9.6 MG/DL (ref 8.5–10.5)
CHLORIDE BLD-SCNC: 107 MMOL/L (ref 98–107)
CO2 SERPL-SCNC: 24 MMOL/L (ref 22–31)
CREAT SERPL-MCNC: 1.65 MG/DL (ref 0.7–1.3)
ERYTHROCYTE [DISTWIDTH] IN BLOOD BY AUTOMATED COUNT: 12.9 % (ref 10–15)
GFR SERPL CREATININE-BSD FRML MDRD: 39 ML/MIN/1.73M2
GLUCOSE BLD-MCNC: 168 MG/DL (ref 70–125)
GLUCOSE BLDC GLUCOMTR-MCNC: 143 MG/DL (ref 70–99)
GLUCOSE BLDC GLUCOMTR-MCNC: 282 MG/DL (ref 70–99)
HCT VFR BLD AUTO: 33.7 % (ref 40–53)
HGB BLD-MCNC: 11 G/DL (ref 13.3–17.7)
INR PPP: 1.11 (ref 0.85–1.15)
MCH RBC QN AUTO: 30.7 PG (ref 26.5–33)
MCHC RBC AUTO-ENTMCNC: 32.6 G/DL (ref 31.5–36.5)
MCV RBC AUTO: 94 FL (ref 78–100)
PLATELET # BLD AUTO: 288 10E3/UL (ref 150–450)
POTASSIUM BLD-SCNC: 4.2 MMOL/L (ref 3.5–5)
RBC # BLD AUTO: 3.58 10E6/UL (ref 4.4–5.9)
SARS-COV-2 RNA RESP QL NAA+PROBE: NEGATIVE
SODIUM SERPL-SCNC: 140 MMOL/L (ref 136–145)
WBC # BLD AUTO: 9.5 10E3/UL (ref 4–11)

## 2021-10-28 PROCEDURE — 36415 COLL VENOUS BLD VENIPUNCTURE: CPT | Performed by: PHYSICIAN ASSISTANT

## 2021-10-28 PROCEDURE — 70450 CT HEAD/BRAIN W/O DYE: CPT

## 2021-10-28 PROCEDURE — 250N000012 HC RX MED GY IP 250 OP 636 PS 637: Performed by: STUDENT IN AN ORGANIZED HEALTH CARE EDUCATION/TRAINING PROGRAM

## 2021-10-28 PROCEDURE — 96374 THER/PROPH/DIAG INJ IV PUSH: CPT | Mod: 59

## 2021-10-28 PROCEDURE — 96376 TX/PRO/DX INJ SAME DRUG ADON: CPT

## 2021-10-28 PROCEDURE — 93970 EXTREMITY STUDY: CPT

## 2021-10-28 PROCEDURE — 87635 SARS-COV-2 COVID-19 AMP PRB: CPT | Performed by: PHYSICIAN ASSISTANT

## 2021-10-28 PROCEDURE — 85027 COMPLETE CBC AUTOMATED: CPT | Performed by: PHYSICIAN ASSISTANT

## 2021-10-28 PROCEDURE — 72125 CT NECK SPINE W/O DYE: CPT

## 2021-10-28 PROCEDURE — 250N000011 HC RX IP 250 OP 636: Performed by: STUDENT IN AN ORGANIZED HEALTH CARE EDUCATION/TRAINING PROGRAM

## 2021-10-28 PROCEDURE — 250N000011 HC RX IP 250 OP 636: Performed by: EMERGENCY MEDICINE

## 2021-10-28 PROCEDURE — 250N000013 HC RX MED GY IP 250 OP 250 PS 637: Performed by: STUDENT IN AN ORGANIZED HEALTH CARE EDUCATION/TRAINING PROGRAM

## 2021-10-28 PROCEDURE — 85730 THROMBOPLASTIN TIME PARTIAL: CPT | Performed by: PHYSICIAN ASSISTANT

## 2021-10-28 PROCEDURE — C9803 HOPD COVID-19 SPEC COLLECT: HCPCS

## 2021-10-28 PROCEDURE — 85610 PROTHROMBIN TIME: CPT | Performed by: PHYSICIAN ASSISTANT

## 2021-10-28 PROCEDURE — 71045 X-RAY EXAM CHEST 1 VIEW: CPT

## 2021-10-28 PROCEDURE — 0W9930Z DRAINAGE OF RIGHT PLEURAL CAVITY WITH DRAINAGE DEVICE, PERCUTANEOUS APPROACH: ICD-10-PCS | Performed by: EMERGENCY MEDICINE

## 2021-10-28 PROCEDURE — 120N000001 HC R&B MED SURG/OB

## 2021-10-28 PROCEDURE — 71260 CT THORAX DX C+: CPT

## 2021-10-28 PROCEDURE — 80048 BASIC METABOLIC PNL TOTAL CA: CPT | Performed by: PHYSICIAN ASSISTANT

## 2021-10-28 PROCEDURE — 99223 1ST HOSP IP/OBS HIGH 75: CPT | Mod: AI | Performed by: STUDENT IN AN ORGANIZED HEALTH CARE EDUCATION/TRAINING PROGRAM

## 2021-10-28 PROCEDURE — 99222 1ST HOSP IP/OBS MODERATE 55: CPT | Performed by: SURGERY

## 2021-10-28 PROCEDURE — 99285 EMERGENCY DEPT VISIT HI MDM: CPT | Mod: 25

## 2021-10-28 PROCEDURE — 32551 INSERTION OF CHEST TUBE: CPT

## 2021-10-28 RX ORDER — METOPROLOL SUCCINATE 100 MG/1
100 TABLET, EXTENDED RELEASE ORAL AT BEDTIME
Status: DISCONTINUED | OUTPATIENT
Start: 2021-10-28 | End: 2021-10-31 | Stop reason: HOSPADM

## 2021-10-28 RX ORDER — ONDANSETRON 2 MG/ML
4 INJECTION INTRAMUSCULAR; INTRAVENOUS EVERY 6 HOURS PRN
Status: DISCONTINUED | OUTPATIENT
Start: 2021-10-28 | End: 2021-10-31 | Stop reason: HOSPADM

## 2021-10-28 RX ORDER — AMLODIPINE BESYLATE 5 MG/1
10 TABLET ORAL DAILY
Status: DISCONTINUED | OUTPATIENT
Start: 2021-10-29 | End: 2021-10-31 | Stop reason: HOSPADM

## 2021-10-28 RX ORDER — ONDANSETRON 4 MG/1
4 TABLET, ORALLY DISINTEGRATING ORAL EVERY 6 HOURS PRN
Status: DISCONTINUED | OUTPATIENT
Start: 2021-10-28 | End: 2021-10-31 | Stop reason: HOSPADM

## 2021-10-28 RX ORDER — LANOLIN ALCOHOL/MO/W.PET/CERES
3 CREAM (GRAM) TOPICAL
Status: DISCONTINUED | OUTPATIENT
Start: 2021-10-28 | End: 2021-10-31 | Stop reason: HOSPADM

## 2021-10-28 RX ORDER — MORPHINE SULFATE 4 MG/ML
4 INJECTION, SOLUTION INTRAMUSCULAR; INTRAVENOUS ONCE
Status: COMPLETED | OUTPATIENT
Start: 2021-10-28 | End: 2021-10-28

## 2021-10-28 RX ORDER — ATORVASTATIN CALCIUM 40 MG/1
40 TABLET, FILM COATED ORAL DAILY
Status: DISCONTINUED | OUTPATIENT
Start: 2021-10-29 | End: 2021-10-31 | Stop reason: HOSPADM

## 2021-10-28 RX ORDER — NICOTINE POLACRILEX 4 MG
15-30 LOZENGE BUCCAL
Status: DISCONTINUED | OUTPATIENT
Start: 2021-10-28 | End: 2021-10-31 | Stop reason: HOSPADM

## 2021-10-28 RX ORDER — LISINOPRIL 20 MG/1
40 TABLET ORAL DAILY
Status: DISCONTINUED | OUTPATIENT
Start: 2021-10-29 | End: 2021-10-31 | Stop reason: HOSPADM

## 2021-10-28 RX ORDER — MULTIVITAMIN WITH IRON
1 TABLET ORAL AT BEDTIME
COMMUNITY
End: 2022-02-11

## 2021-10-28 RX ORDER — ACETAMINOPHEN 325 MG/1
975 TABLET ORAL EVERY 8 HOURS PRN
Status: DISCONTINUED | OUTPATIENT
Start: 2021-10-28 | End: 2021-10-31 | Stop reason: HOSPADM

## 2021-10-28 RX ORDER — DEXTROSE MONOHYDRATE 25 G/50ML
25-50 INJECTION, SOLUTION INTRAVENOUS
Status: DISCONTINUED | OUTPATIENT
Start: 2021-10-28 | End: 2021-10-31 | Stop reason: HOSPADM

## 2021-10-28 RX ORDER — IOPAMIDOL 755 MG/ML
75 INJECTION, SOLUTION INTRAVASCULAR ONCE
Status: COMPLETED | OUTPATIENT
Start: 2021-10-28 | End: 2021-10-28

## 2021-10-28 RX ORDER — ASPIRIN 81 MG/1
81 TABLET, CHEWABLE ORAL DAILY
Status: DISCONTINUED | OUTPATIENT
Start: 2021-10-28 | End: 2021-10-31 | Stop reason: HOSPADM

## 2021-10-28 RX ORDER — LIDOCAINE 40 MG/G
CREAM TOPICAL
Status: DISCONTINUED | OUTPATIENT
Start: 2021-10-28 | End: 2021-10-31 | Stop reason: HOSPADM

## 2021-10-28 RX ORDER — METOPROLOL SUCCINATE 100 MG/1
200 TABLET, EXTENDED RELEASE ORAL EVERY MORNING
Status: DISCONTINUED | OUTPATIENT
Start: 2021-10-29 | End: 2021-10-31 | Stop reason: HOSPADM

## 2021-10-28 RX ADMIN — METOPROLOL SUCCINATE 100 MG: 100 TABLET, FILM COATED, EXTENDED RELEASE ORAL at 21:19

## 2021-10-28 RX ADMIN — MORPHINE SULFATE 4 MG: 4 INJECTION INTRAVENOUS at 13:54

## 2021-10-28 RX ADMIN — IOPAMIDOL 75 ML: 755 INJECTION, SOLUTION INTRAVENOUS at 11:38

## 2021-10-28 RX ADMIN — INSULIN ASPART 3 UNITS: 100 INJECTION, SOLUTION INTRAVENOUS; SUBCUTANEOUS at 19:41

## 2021-10-28 RX ADMIN — ASPIRIN 81 MG CHEWABLE TABLET 81 MG: 81 TABLET CHEWABLE at 18:52

## 2021-10-28 RX ADMIN — ENOXAPARIN SODIUM 40 MG: 40 INJECTION SUBCUTANEOUS at 21:19

## 2021-10-28 RX ADMIN — METFORMIN HYDROCHLORIDE 500 MG: 500 TABLET ORAL at 18:52

## 2021-10-28 RX ADMIN — HUMAN INSULIN 24 UNITS: 100 INJECTION, SUSPENSION SUBCUTANEOUS at 18:54

## 2021-10-28 ASSESSMENT — ACTIVITIES OF DAILY LIVING (ADL)
ADLS_ACUITY_SCORE: 7
WEAR_GLASSES_OR_BLIND: NO
DOING_ERRANDS_INDEPENDENTLY_DIFFICULTY: NO
ADLS_ACUITY_SCORE: 13
FALL_HISTORY_WITHIN_LAST_SIX_MONTHS: YES
ADLS_ACUITY_SCORE: 9
ADLS_ACUITY_SCORE: 13
TOILETING_ASSISTANCE: TOILETING DIFFICULTY, ASSISTANCE 1 PERSON
ADLS_ACUITY_SCORE: 7
CONCENTRATING,_REMEMBERING_OR_MAKING_DECISIONS_DIFFICULTY: NO
DRESSING/BATHING: BATHING DIFFICULTY, ASSISTANCE 1 PERSON
WALKING_OR_CLIMBING_STAIRS_DIFFICULTY: NO
ADLS_ACUITY_SCORE: 13
ADLS_ACUITY_SCORE: 13
TOILETING_ISSUES: YES
HEARING_DIFFICULTY_OR_DEAF: NO
ADLS_ACUITY_SCORE: 7
DIFFICULTY_EATING/SWALLOWING: NO
ADLS_ACUITY_SCORE: 7
DIFFICULTY_COMMUNICATING: NO
DRESSING/BATHING_DIFFICULTY: YES
ADLS_ACUITY_SCORE: 7
ADLS_ACUITY_SCORE: 11

## 2021-10-28 ASSESSMENT — MIFFLIN-ST. JEOR
SCORE: 1735.04
SCORE: 1735.04

## 2021-10-28 NOTE — CONSULTS
HPI  81 year old year old male who I have been consulted by No ref. provider found for evaluation of Shoulder Injury  81-year-old gentleman who rolled a small tractor over his right side 2 days ago.  Had some shortness of breath but was ambulatory at the scene and stated home for couple of days.  Had some difficulty breathing and pain on the right side and elected to present to the emergency room.  Currently he is sitting in bed and feels relatively well.  Main complaint is right shoulder and right rib pain.  No loss of consciousness or any other complaints.      Allergies:Pioglitazone    Past Medical History:   Diagnosis Date     CAD (coronary artery disease)      CKD (chronic kidney disease)      Diabetes (H)      HLD (hyperlipidemia)      Hypertension        No past surgical history on file.      CURRENT MEDS:    Current Facility-Administered Medications:      acetaminophen (TYLENOL) tablet 975 mg, 975 mg, Oral, Q8H PRN, Tricia Mclaughlin MD     [START ON 10/29/2021] amLODIPine (NORVASC) tablet 10 mg, 10 mg, Oral, Daily, Tricia Mclaughlin MD     aspirin (ASA) chewable tablet 81 mg, 81 mg, Oral, Daily, Tricia Mclaughlin MD     [START ON 10/29/2021] atorvastatin (LIPITOR) tablet 40 mg, 40 mg, Oral, Daily, Tricia Mclaughlin MD     glucose gel 15-30 g, 15-30 g, Oral, Q15 Min PRN **OR** dextrose 50 % injection 25-50 mL, 25-50 mL, Intravenous, Q15 Min PRN **OR** glucagon injection 1 mg, 1 mg, Subcutaneous, Q15 Min PRN, Tricia Mclaughlin MD     enoxaparin ANTICOAGULANT (LOVENOX) injection 40 mg, 40 mg, Subcutaneous, Q24H, Tricia Mclaughlin MD     insulin aspart (NovoLOG) injection (RAPID ACTING), 1-7 Units, Subcutaneous, TID Lissette MA Leslie, MD     insulin aspart (NovoLOG) injection (RAPID ACTING), 1-5 Units, Subcutaneous, At Bedtime, Tricia Mclaughlin MD     insulin NPH-Regular (HUMULIN 70/30;NOVOLIN 70/30) injection 24 Units, 24 Units, Subcutaneous, BID Lissette MA Leslie, MD     lidocaine (LMX4) cream, , Topical, Q1H PRN, Tricia Mclaughlin,  MD     lidocaine 1 % 0.1-1 mL, 0.1-1 mL, Other, Q1H PRN, Tricia Mclaughlin MD     [START ON 10/29/2021] lisinopril (ZESTRIL) tablet 40 mg, 40 mg, Oral, Daily, Tricia Mclaughlin MD     melatonin tablet 3 mg, 3 mg, Oral, At Bedtime PRN, Tricia Mclaughlin MD     metFORMIN (GLUCOPHAGE) tablet 500 mg, 500 mg, Oral, BID w/meals, Tricia Mclaughlin MD     metoprolol succinate ER (TOPROL-XL) 24 hr tablet 100 mg, 100 mg, Oral, At Bedtime, Tricia Mclaughlin MD     [START ON 10/29/2021] metoprolol succinate ER (TOPROL-XL) 24 hr tablet 200 mg, 200 mg, Oral, QAM, Tricia Mclaughlin MD     ondansetron (ZOFRAN-ODT) ODT tab 4 mg, 4 mg, Oral, Q6H PRN **OR** ondansetron (ZOFRAN) injection 4 mg, 4 mg, Intravenous, Q6H PRN, Tricia Mclaughlin MD     oxyCODONE IR (ROXICODONE) half-tab 2.5 mg, 2.5 mg, Oral, Q4H PRN, Tricia Mclaughlin MD     sodium chloride (PF) 0.9% PF flush 3 mL, 3 mL, Intracatheter, Q8H, Tricia Mclaughlin MD     sodium chloride (PF) 0.9% PF flush 3 mL, 3 mL, Intracatheter, q1 min prn, Tricia Mclaughlin MD    FAMHX-reviewed and noncontributory     reports that he has never smoked. He has never used smokeless tobacco. He reports current alcohol use. He reports that he does not use drugs.    Review of Systems:  The 12 point review of systems  is within normal limits except for as mentioned above in the HPI.  General ROS: No complaints or constitutional symptoms  Ophthalmic ROS: No complaints of visual changes  Skin: No complaints or symptoms   Endocrine: No complaints or symptoms  Hematologic/Lymphatic: No symptoms or complaints  Psychiatric: No symptoms or complaints  Respiratory ROS: As per HPI  Cardiovascular ROS: no chest pain or dyspnea on exertion  Gastrointestinal ROS: No complaints  Genito-Urinary ROS: no dysuria, trouble voiding, or hematuria  Musculoskeletal ROS: no joint or muscle pain  Neurological ROS: no TIA or stroke symptoms      EXAM:  BP (!) 182/87 (BP Location: Left arm)   Pulse 76   Temp 98.7  F (37.1  C) (Oral)   Resp 18   Ht  "1.803 m (5' 11\")   Wt 99.8 kg (220 lb)   SpO2 94%   BMI 30.68 kg/m    GENERAL: Well developed male, No acute distress, pleasant and conversant   EYES: Pupils equal, round and reactive, no scleral icterus  CARDIAC: Regular rate and rhythm, no obvious murmurs noted  CHEST/LUNG: Tender to palpation on the right rib cage in the posterior and lateral aspects  ABDOMEN: Soft, nontender, nondistended   pelvis is stable to AP and lateral rock  SKIN: Pink, warm and dry, no obvious rashes or lesions   NEURO:No focal deficits, ambulatory  MUSCULOSKELETAL:No obvious deformities, no swelling, normal appearing      LABS:  Lab Results   Component Value Date    WBC 9.5 10/28/2021    HGB 11.0 10/28/2021    HGB 11.9 01/13/2021    HCT 33.7 10/28/2021    HCT 40.5 01/13/2021    MCV 94 10/28/2021    .1 01/13/2021     10/28/2021     INR/Prothrombin Time  Recent Labs   Lab 10/28/21  1000      CO2 24   BUN 26     Lab Results   Component Value Date    ALT 18.7 11/07/2019    AST 18.7 11/07/2019    ALKPHOS 76.4 11/07/2019       IMAGES:   Relevant images were reviewed and discussed with the patient.  Notable findings were: CT scan of the chest abdomen pelvis was reviewed and demonstrates small right-sided pneumothorax with mildly multiple rib fractures and a fractured clavicle    Assessment/Plan:   Senthil Castillo is a 81 year old male with small pneumothorax and several rib fractures on the right with mild displacement.  He also has a right clavicular fracture.  At this point I have discussed the case with the emergency room physician who has plans to place a small chest tube to address the pneumothorax.  Recommend orthopedic evaluation for his right clavicular fracture.  -Pain control and pulmonary hygiene are going to be the mainstay of treatment at this point  -We will continue to monitor and manage the chest tube.      Eddi Watson D.O. FACS  [phone]  "

## 2021-10-28 NOTE — ED PROVIDER NOTES
ED PROVIDER NOTE    EMERGENCY DEPARTMENT ENCOUNTER      NAME: Senthil Castillo  AGE: 81 year old male  YOB: 1940  MRN: [id number]  EVALUATION DATE & TIME: 10/28/2021  9:24 AM    PCP: Mo Lovett    ED PROVIDER: Gladys Arambula PA-C      Chief Complaint   Patient presents with     Shoulder Injury         FINAL IMPRESSION:  1. Closed fracture of multiple ribs of right side, initial encounter    2. Traumatic pneumothorax, initial encounter    3. Fall, initial encounter    4. Contact with powered lawnmower as cause of accidental injury, initial encounter          MEDICAL DECISION MAKING:    Pertinent Labs & Imaging studies reviewed. (See chart for details)  81 year old male with a h/o CKD, CAD, HLD, HTN, DM II presents to the Emergency Department for evaluation of right shoulder pain and difficulty breathing after falling off his lawnmower 2 days ago.  He was going up a culvert when the lawnmower started rolling backwards and flipped.  He landed on his right side, the lawn more on his left side.  Neighbor was able to get it off of him after about 10 minutes.    Here he is just slightly hypertensive but otherwise vitals are stable.  Clinically he is having quite a bit of discomfort with any movement particularly in the right shoulder and right ribs.  No respiratory distress.  On examination has obvious swelling over the right clavicle region and tenderness over the right ribs.  Also has some slight tenderness in the right upper quadrant and perhaps a little bit of bruising over the right side.    While he denies any headache or confusion, he did hit his head initially and therefore we are going to get a CT of his head and C-spine given the mechanism.  We will also get a CT of the chest, abdomen and pelvis to evaluate for thoracic and abdominal trauma.  High suspicion for clavicle and rib fractures.    Imaging studies revealed no intracranial or C-spine injury.    He has a comminuted right clavicle fracture as  well as multiple rib fractures - ant/post fx of ribs 2-8 and a small right hemopneumothorax.    I discussed presentation with general surgery who did recommend a pigtail catheter and felt patient stable for admission here at [facility].    Pigtail catheter placed in the ED.  Patient will be admitted to the [facility] resident service.    At the conclusion of the encounter I discussed the results of all of the tests and the disposition. The questions were answered. The patient or family acknowledged understanding and was agreeable with the care plan.     ED COURSE  9:27 AM   Met and evaluated patient. Discussed ED plan.   9:42 AM I staffed the patient with Dr. Sarah Miller.  11:58 I spoke with Radiology over the phone regarding CT results. Head and C-spine negative. Small apical pneumothorax on right, and right clavicle fracture.  12:27 PM I spoke with Dr. Watson over the phone. He will call back in ~5 minutes.  12:32 PM I updated the patient regarding plan of care. He refused the morphine.  12:36 PM I spoke with Dr. Watson over the phone. He recommends putting in a pigtail chest tube for management of pneumothorax.  12:41 PM I spoke with [facility] resident, Dr. Nevarez, over the phone. Accepted patient for admission.  12:43 PM I requested consent for the tube insertion. Patient gave consent.  1:29 PM I performed the chest tube, pig catheter procedure with Dr. Miller. No complication  2:21 PM Patient still in ED but post insertion CXR reveals no residual PNX and plan on admission.  Dr. Miller aware of plan.     MEDICATIONS GIVEN IN THE EMERGENCY:  Medications   morphine (PF) injection 4 mg (4 mg Intravenous Not Given 10/28/21 1144)   iopamidol (ISOVUE-370) solution 75 mL (75 mLs Intravenous Given 10/28/21 1138)       NEW PRESCRIPTIONS STARTED AT TODAY'S ER VISIT  New Prescriptions    No medications on file          =================================================================    HPI    Patient information was obtained from:  "patient       Senthil Castillo is a 81 year old male h/o CKD, CAD, HLD, HTN, DM II who presents to the Emergency Department for evaluation of shortness of breath and right shoulder pain.    2 days ago, patient was mowing on his lawn and as he was going up a culvert when the lawnmower rolled over and fell on top of his left side, as he fell and landed on his right side. He was under the lawnmower for 5-10 minutes until a neighbor helped him up. Wife was with him afterwards and reports he seemed \"out of it,\" but has been clear mentally since then. Patient reports right shoulder pain since the fall, and presented to the ED this morning after developing shortness of breath and difficulty breathing. Denies chest pain or abdominal pain. Denies headache, neck pain, arm or leg pain.  Has some scratches on the right hand but denies any pain there.      REVIEW OF SYSTEMS   Constitutional: Negative for fevers, chills.  Vision: Negative for vision changes  HENT:  Negative for congestion, sore throat  Pulmonary: + Difficulty breathing and right rib pain  Cardiac: Negative for chest pain  GI:Negative for nausea, vomiting, diarrhea, abdominal pain  : Negative for urinary symptoms  Musculoskeletal: Positive for right shoulder pain.  Skin: Abrasions on right hand  Endocrine: Negative for weight loss  Neuro: Negative for weakness, numbness    All other systems reviewed and are negative      PAST MEDICAL HISTORY:  CKD  DM II  HTN  CAD    PAST SURGICAL HISTORY:  No past surgical history on file.        CURRENT MEDICATIONS:      Current Facility-Administered Medications:      morphine (PF) injection 4 mg, 4 mg, Intravenous, Once, Sarah Miller MD    Current Outpatient Medications:      amLODIPine (NORVASC) 10 MG tablet, Take 1 tablet (10 mg) by mouth daily, Disp: 90 tablet, Rfl: 1     aspirin (ASA) 325 MG tablet, Take 1 tablet (325 mg) by mouth every other day (Patient taking differently: Take 162.5 mg by mouth daily ), Disp: 90 " tablet, Rfl: 1     atorvastatin (LIPITOR) 40 MG tablet, TAKE 1 TABLET BY MOUTH EVERY DAY, Disp: 90 tablet, Rfl: 1     insulin NPH-insulin regular MIX (NOVOLIN MIX VIAL) (70-30) 100 UNIT/ML, Inject 30 Units Subcutaneous 2 times daily (with meals) , Disp: , Rfl:      lisinopril (ZESTRIL) 40 MG tablet, Take 1 tablet (40 mg) by mouth daily, Disp: 90 tablet, Rfl: 1     magnesium 250 MG tablet, Take 1 tablet by mouth At Bedtime, Disp: , Rfl:      metFORMIN (GLUCOPHAGE) 500 MG tablet, Take 1 tablet (500 mg) by mouth 2 times daily (with meals), Disp: 180 tablet, Rfl: 1     metoprolol succinate ER (TOPROL-XL) 100 MG 24 hr tablet, TAKE 2 TABLETS BY MOUTH IN THE MORNING AND 1 TABLET AT NIGHT, Disp: 270 tablet, Rfl: 0     blood glucose (ONETOUCH ULTRA) test strip, Use up to 3 times daily, Disp: 300 strip, Rfl: 1     insulin pen needle (BD ULTRA-FINE) 29G X 12.7MM miscellaneous, Use twice daily, Disp: 180 each, Rfl: 1     magnesium chloride 535 (64 Mg) MG TBEC CR tablet, Take 1 tablet (535 mg) by mouth daily, Disp: 90 tablet, Rfl: 1    ALLERGIES:  Allergies   Allergen Reactions     Pioglitazone Swelling     Foot swelling       FAMILY HISTORY:  Family History   Problem Relation Age of Onset     Hypertension Mother      Diabetes Mother      Breast Cancer Mother      Cerebrovascular Disease Father        SOCIAL HISTORY:   Smoking: Denies  Alcohol: Denies  Illicit drug: Denies    Other:     VITALS:  Vitals:    10/28/21 0940 10/28/21 1200 10/28/21 1215 10/28/21 1230   BP:  (!) 155/74 (!) 149/80 (!) 152/70   Pulse:  74 75 74   Resp:       Temp: 98.6  F (37  C)      TempSrc: Oral      SpO2:  94% 95% 93%   Weight:       Height:           PHYSICAL EXAM    General Appearance:  Alert, cooperative, no distress, appears stated age  Head: Normocephalic without obvious deformity, atraumatic.  Eyes: Conjunctiva clear, Lids normal. PERRL. No periorbital swelling or eccymosis.  Ear, Nose, Throat: Face nontraumatic, moist mucus membranes.    Neck: Supple. No cervical tenderness.  Lungs: No distress. Lungs clear to ausculation bilaterally. No wheezes, rhonchi or stridor. + right lateral chest wall tenderness  Heart:: Regular rate and rhythm, no murmur, rub or gallop. Strong, equal distal pulses.    Abdomen: Soft, mild RUQ tenderness.  No rebound or guarding.     Musculoskeletal: + swelling and tenderness over the mid right clavicle. + right lateral rib pain.  Difficulty moving the right shoulder d/t pain in clavicle but no tenderness over the right elbow, wrist or hand. Good  strength.  2+ radial pulses bilaterally.  Normal ROM and non tender LUE as well as b/l lower extremities.  No C, T or L spine tenderness.  Skin: Warm, dry. Superficial abrasions on right hand.  Mild purpuric region on right side/flank.   Neurologic: GCS 15 Alert and orientated appropriately. No focal deficits.  Psych: Normal mood and affect      LAB:  Labs Ordered and Resulted from Time of ED Arrival to Time of ED Departure   BASIC METABOLIC PANEL - Abnormal       Result Value    Sodium 140      Potassium 4.2      Chloride 107      Carbon Dioxide (CO2) 24      Anion Gap 9      Urea Nitrogen 26      Creatinine 1.65 (*)     Calcium 9.6      Glucose 168 (*)     GFR Estimate 39 (*)    CBC WITH PLATELETS - Abnormal    WBC Count 9.5      RBC Count 3.58 (*)     Hemoglobin 11.0 (*)     Hematocrit 33.7 (*)     MCV 94      MCH 30.7      MCHC 32.6      RDW 12.9      Platelet Count 288     GLUCOSE BY METER - Abnormal    GLUCOSE BY METER POCT 143 (*)    PARTIAL THROMBOPLASTIN TIME - Normal    aPTT 29     INR - Normal    INR 1.11     COVID-19 VIRUS (CORONAVIRUS) BY PCR - Normal    SARS CoV2 PCR Negative         RADIOLOGY:  CT Chest/Abdomen/Pelvis w Contrast   Final Result   IMPRESSION:      1.  Small right hemopneumothorax and associated atelectasis. No mediastinal shift.   2.  No traumatic vascular or abdominal visceral injuries.   3.  Comminuted fracture of the right clavicle. Multiple  multipart right rib fractures including ribs 2 through 8 with moderate displacement of posterior ribs 4, 5, 7, and 8.   4.  Direct right inguinal hernia containing terminal ileum and cecum. No findings to suggest hernia incarceration or associated mechanical bowel obstruction.      CT Cervical Spine w/o Contrast   Final Result   IMPRESSION:   HEAD CT:   1.  No acute intracranial abnormality.   2.  Mild age-related changes.      CERVICAL SPINE CT:   1.  No CT evidence for acute fracture or post traumatic subluxation.   2.  Right apical pneumothorax. See chest CT report.   3.  Partially visualized is a lateral right clavicle fracture.   4.  Findings called to Gladys Arambula in the [facility] ER at 11:59 AM on 10/28/2021.      CT Head w/o Contrast   Final Result   IMPRESSION:   HEAD CT:   1.  No acute intracranial abnormality.   2.  Mild age-related changes.      CERVICAL SPINE CT:   1.  No CT evidence for acute fracture or post traumatic subluxation.   2.  Right apical pneumothorax. See chest CT report.   3.  Partially visualized is a lateral right clavicle fracture.   4.  Findings called to Gladys Arambula in the [facility] ER at 11:59 AM on 10/28/2021.      XR Chest Port 1 View    (Results Pending)       PROCEDURES:   Pigtail chest tail insertion as described in Dr. Miller note      Gladys Arambula PA-C   Emergency Medicine     Ralf, Gladys ROBINS PA-C  10/28/21 7711

## 2021-10-28 NOTE — ED NOTES
"[facility] ED Handoff Report    ED Chief Complaint:     ED Diagnosis:  (S22.41XA) Closed fracture of multiple ribs of right side, initial encounter  Comment:   Plan:     (S27.0XXA) Traumatic pneumothorax, initial encounter  Comment:   Plan:     (W19.XXXA) Fall, initial encounter  Comment:   Plan:     (W28.XXXA) Contact with powered lawnmower as cause of accidental injury, initial encounter  Comment:   Plan:     (S42.034A) Closed nondisplaced fracture of acromial end of right clavicle, initial encounter  Comment:   Plan:        PMH:    Past Medical History:   Diagnosis Date     CAD (coronary artery disease)      CKD (chronic kidney disease)      Diabetes (H)      HLD (hyperlipidemia)      Hypertension         Code Status:  Full Code     Falls Risk: No Band: Not applicable    Current Living Situation/Residence: lives with a significant other     Elimination Status: Continent: Yes     Activity Level: Independent    Patients Preferred Language:  English     Needed: No    Vital Signs:  BP (!) 144/68   Pulse 72   Temp 98.6  F (37  C) (Oral)   Resp 18   Ht 1.803 m (5' 11\")   Wt 99.8 kg (220 lb)   SpO2 93%   BMI 30.68 kg/m       Cardiac Rhythm: NSR    Pain Score: 6/10    Is the Patient Confused:  No    Last Food or Drink: 10/28/21 at 1230pm    Focused Assessment:  81 year old male with a h/o CKD, CAD, HLD, HTN, DM II presents to the Emergency Department for evaluation of right shoulder pain and difficulty breathing after falling off his lawnmower 2 days ago.  Trauma alert not called per PA  Neuro: Oriented x4  IV/drain: PIV left arm, New pigtail chest tube to upper right chest, on low continuous section with atrium  VSS: VSS on RA  Resp: Clear,    Cardio: WDL.   GI/: Voiding adequately ,   Pain/Discomfort: swelling over the right clavicle region and tenderness over the right ribs.  small amount of scattered bruising over the right side   Activity: Up independently, but painful    Tests " Performed: Done: Labs and Imaging    Treatments Provided:  Chest tube    Family Dynamics/Concerns: No    Family Updated On Visitor Policy: Yes    Plan of Care Communicated to Family: Yes    Who Was Updated about Plan of Care: Pt and wife    Belongings Checklist Done and Signed by Patient: Yes    Covid: asymptomatic , pending    Additional Information:     RN: Reagan Escobar   10/28/2021 4:15 PM

## 2021-10-28 NOTE — PHARMACY-ADMISSION MEDICATION HISTORY
Pharmacy Note - Admission Medication History    Pertinent Provider Information: flagged magnesium CR for removal since the patient takes OTC magnesium 250mg instead     ______________________________________________________________________    Prior To Admission (PTA) med list completed and updated in EMR.       PTA Med List   Medication Sig Last Dose     amLODIPine (NORVASC) 10 MG tablet Take 1 tablet (10 mg) by mouth daily 10/28/2021 at Unknown time     aspirin (ASA) 325 MG tablet Take 1 tablet (325 mg) by mouth every other day (Patient taking differently: Take 162.5 mg by mouth daily ) 10/28/2021 at Unknown time     atorvastatin (LIPITOR) 40 MG tablet TAKE 1 TABLET BY MOUTH EVERY DAY 10/28/2021 at Unknown time     insulin NPH-insulin regular MIX (NOVOLIN MIX VIAL) (70-30) 100 UNIT/ML Inject 30 Units Subcutaneous 2 times daily (with meals)  10/28/2021 at Unknown time     lisinopril (ZESTRIL) 40 MG tablet Take 1 tablet (40 mg) by mouth daily 10/28/2021 at Unknown time     magnesium 250 MG tablet Take 1 tablet by mouth At Bedtime 10/27/2021 at Unknown time     metFORMIN (GLUCOPHAGE) 500 MG tablet Take 1 tablet (500 mg) by mouth 2 times daily (with meals) 10/28/2021 at Unknown time     metoprolol succinate ER (TOPROL-XL) 100 MG 24 hr tablet TAKE 2 TABLETS BY MOUTH IN THE MORNING AND 1 TABLET AT NIGHT 10/28/2021 at Unknown time       [facility](s[facility]  Method of interview communication: in-person    Summary of Changes to PTA Med List  New: none  Discontinued: none  Changed: aspirin, Novolin mix, magnesium CR to OTC    Patient was asked about OTC/herbal products specifically.  PTA med list reflects this.    In the past week, patient estimated taking medication this percent of the time:  greater than 90%.    Allergies were reviewed, assessed, and updated with the patient.      Patient does not use any multi-dose medications prior to admission.    The information provided in  this note is only as accurate as the sources available at the time of the update(s).    Thank you for the opportunity to participate in the care of this patient.    Kim Bella RPH  10/28/2021 1:17 PM

## 2021-10-28 NOTE — ED NOTES
Pt rating r shoulder pain 6/10, order for MS 4mg. Pt refused. Stated he did not want to take any pain meds.
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
## 2021-10-28 NOTE — ED NOTES
Pt here with c/o R shoulder and torso pain, difficulty breathing due to pain. Pt has slight bruising noted to R lower side/flank area. R clavicle area swollen.

## 2021-10-28 NOTE — ED NOTES
81 year old male with a h/o CKD, CAD, HLD, HTN, DM II presents to the Emergency Department for evaluation of right shoulder pain and difficulty breathing after falling off his lawnmower 2 days ago.  Trauma alert not called per PA  Neuro: Oriented x4  IV/drain: PIV left arm, New pigtail chest tube to upper right chest, on low continuous section with atrium  VSS: VSS on RA  Resp: Clear,    Cardio: WDL.   GI/: Voiding adequately ,   Pain/Discomfort: swelling over the right clavicle region and tenderness over the right ribs.  small amount of scattered bruising over the right side   Activity: Up independently, but painful

## 2021-10-28 NOTE — ED PROVIDER NOTES
I, Sarah Miller have reviewed the documentation, personally taken the patient's history, performed an exam and agree with the physical finds, diagnosis and management plan.    HPI: Senthil Castillo is a 81 year old male with a history of CKD, DM, HTN, HLD, CAD. He is not anticoagulated other than baby ASA. Two days ago a lawnmower fell on top of him. He fell onto his right side with the lawnmower on his left. Patient was disoriented initially. Today he complains of right sided shoulder pain and states it is hard to breath.      Physical Exam: Pleasant elderly male no acute distress.  Head is atraumatic.  No reproducible tenderness to palpation of the midline cervical, thoracic, lumbar spine.  Pain with range of motion to the right shoulder, no marissa deformity.  He does have some tenderness palpation over the clavicle as well as the right rib cage diffusely both anteriorly and posteriorly.  No subcutaneous emphysema.  Abdomen is soft and nontender.    MDM: Close head injury, cervical spine injury.  Biggest concern is for potential rib fracture, clavicular fracture, and less likely fracture of the humerus or shoulder dislocation    ED Course/workup: Initially declined any analgesia but later complaining of pain and given morphine.  Laboratory work-up unremarkable.  Baseline renal insufficiency and anemia.  CT imaging of the head and neck unremarkable.  CT chest abdomen pelvis with small right pneumothorax.  Multiple multi part right rib fractures right 2 through 8 ribs some with displacement.  Incidental hernia.        ED Course as of Oct 28 1422   Thu Oct 28, 2021   0945 I staffed patient with MI Maya.      1029 Creatinine(!): 1.65   1133  I met with patient and performed initial exam.      1240 I evaluated and updated patient.      1331 Performed a chest tube placement with MI Maya.          Procedures:  PROCEDURE: Tube Thoracostomy   TYPE: Pigtail chest tube   INDICATIONS: It is medically  necessary to place a chest tube for pneumothorax   PROCEDURE PROVIDER: Sarah Miller   CONSENT: Risks, benefits and alternatives were discussed with and Written and verbal consent was obtained from Patient.     TIME OUT: Universal protocol was followed. TIME OUT conducted just prior to starting procedure confirmed patient identity, site/side, procedure, patient position, and availability of correct equipment. Yes   SITE: Right 4th-5th intercostal space mid axillary line   MEDICATIONS 1% lidocaine with epi 20 mL   NOTE: Patient was placed in a semirecumbent position with the head of the bed at 30 degrees.  The right prepped with chlorhexidine. Patient was medicated as above. Incision was made laterally in the midaxilary line.  Blunt dissection up and over the rib was preformed until access was obtained to the pleural cavity.  A Cook catheter or 10 Czech chest tube was placed and connected to Pleurovac on continuous suction. Tube was sutured in place with fast lock adhesive closure, and all connections banded.    There was air rush, but no liquid drainage.     Post procedure X-ray showing complete reexpansion of the lung.    Patient tolerated procedure Patient tolerated procedure well, without complication   COMPLICATIONS: no complications           Final Diagnosis:     ICD-10-CM    1. Closed fracture of multiple ribs of right side, initial encounter  S22.41XA    2. Traumatic pneumothorax, initial encounter  S27.0XXA    3. Fall, initial encounter  W19.XXXA    4. Contact with powered lawnmower as cause of accidental injury, initial encounter  W28.XXXA    5. Closed nondisplaced fracture of acromial end of right clavicle, initial encounter  S42.034A            Sarah Miller MD      Critical Care  Performed by: Sarah Miller MD  Authorized by: Sarah Miller MD     Total critical care time: 35 minutes  Criticalcare time was exclusive of separately billable procedures and treating other patients.  Critical care was  necessary to treat or prevent imminent or life-threatening deterioration of the following conditions: pneumothorax,multiple rib fractures   Critical care was time spent personally by me on the following activities: development of treatment plan with patient or surrogate, discussions with consultants, examination of patient, evaluation of patient's response totreatment, obtaining history from patient or surrogate, ordering and performing treatments and interventions, ordering and review of laboratory studies, ordering and review of radiographic studies and re-evaluation ofpatient's condition, this excludes any separately billable procedures.       Sarah Miller MD  10/28/21 2191
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
## 2021-10-29 ENCOUNTER — APPOINTMENT (OUTPATIENT)
Dept: RADIOLOGY | Facility: HOSPITAL | Age: 81
DRG: 200 | End: 2021-10-29
Attending: SURGERY
Payer: COMMERCIAL

## 2021-10-29 LAB
ANION GAP SERPL CALCULATED.3IONS-SCNC: 8 MMOL/L (ref 5–18)
BUN SERPL-MCNC: 25 MG/DL (ref 8–28)
CALCIUM SERPL-MCNC: 9.1 MG/DL (ref 8.5–10.5)
CHLORIDE BLD-SCNC: 106 MMOL/L (ref 98–107)
CO2 SERPL-SCNC: 25 MMOL/L (ref 22–31)
CREAT SERPL-MCNC: 1.57 MG/DL (ref 0.7–1.3)
ERYTHROCYTE [DISTWIDTH] IN BLOOD BY AUTOMATED COUNT: 12.9 % (ref 10–15)
GFR SERPL CREATININE-BSD FRML MDRD: 41 ML/MIN/1.73M2
GLUCOSE BLD-MCNC: 215 MG/DL (ref 70–125)
GLUCOSE BLDC GLUCOMTR-MCNC: 142 MG/DL (ref 70–99)
GLUCOSE BLDC GLUCOMTR-MCNC: 152 MG/DL (ref 70–99)
GLUCOSE BLDC GLUCOMTR-MCNC: 153 MG/DL (ref 70–99)
GLUCOSE BLDC GLUCOMTR-MCNC: 162 MG/DL (ref 70–99)
GLUCOSE BLDC GLUCOMTR-MCNC: 182 MG/DL (ref 70–99)
HCT VFR BLD AUTO: 32.3 % (ref 40–53)
HGB BLD-MCNC: 10.4 G/DL (ref 13.3–17.7)
MCH RBC QN AUTO: 29.7 PG (ref 26.5–33)
MCHC RBC AUTO-ENTMCNC: 32.2 G/DL (ref 31.5–36.5)
MCV RBC AUTO: 92 FL (ref 78–100)
PLATELET # BLD AUTO: 277 10E3/UL (ref 150–450)
POTASSIUM BLD-SCNC: 4 MMOL/L (ref 3.5–5)
RBC # BLD AUTO: 3.5 10E6/UL (ref 4.4–5.9)
SODIUM SERPL-SCNC: 139 MMOL/L (ref 136–145)
WBC # BLD AUTO: 8.2 10E3/UL (ref 4–11)

## 2021-10-29 PROCEDURE — 85014 HEMATOCRIT: CPT | Performed by: STUDENT IN AN ORGANIZED HEALTH CARE EDUCATION/TRAINING PROGRAM

## 2021-10-29 PROCEDURE — 250N000011 HC RX IP 250 OP 636: Performed by: STUDENT IN AN ORGANIZED HEALTH CARE EDUCATION/TRAINING PROGRAM

## 2021-10-29 PROCEDURE — 250N000013 HC RX MED GY IP 250 OP 250 PS 637: Performed by: SURGERY

## 2021-10-29 PROCEDURE — 99233 SBSQ HOSP IP/OBS HIGH 50: CPT | Mod: GC | Performed by: STUDENT IN AN ORGANIZED HEALTH CARE EDUCATION/TRAINING PROGRAM

## 2021-10-29 PROCEDURE — 250N000013 HC RX MED GY IP 250 OP 250 PS 637: Performed by: STUDENT IN AN ORGANIZED HEALTH CARE EDUCATION/TRAINING PROGRAM

## 2021-10-29 PROCEDURE — 71046 X-RAY EXAM CHEST 2 VIEWS: CPT

## 2021-10-29 PROCEDURE — 99231 SBSQ HOSP IP/OBS SF/LOW 25: CPT | Performed by: SURGERY

## 2021-10-29 PROCEDURE — 36415 COLL VENOUS BLD VENIPUNCTURE: CPT | Performed by: STUDENT IN AN ORGANIZED HEALTH CARE EDUCATION/TRAINING PROGRAM

## 2021-10-29 PROCEDURE — 120N000001 HC R&B MED SURG/OB

## 2021-10-29 PROCEDURE — 80048 BASIC METABOLIC PNL TOTAL CA: CPT | Performed by: STUDENT IN AN ORGANIZED HEALTH CARE EDUCATION/TRAINING PROGRAM

## 2021-10-29 RX ORDER — NALOXONE HYDROCHLORIDE 0.4 MG/ML
0.4 INJECTION, SOLUTION INTRAMUSCULAR; INTRAVENOUS; SUBCUTANEOUS
Status: DISCONTINUED | OUTPATIENT
Start: 2021-10-29 | End: 2021-10-31 | Stop reason: HOSPADM

## 2021-10-29 RX ORDER — FENTANYL 12.5 UG/1
12 PATCH TRANSDERMAL
Status: DISCONTINUED | OUTPATIENT
Start: 2021-10-29 | End: 2021-10-31 | Stop reason: HOSPADM

## 2021-10-29 RX ORDER — NALOXONE HYDROCHLORIDE 0.4 MG/ML
0.2 INJECTION, SOLUTION INTRAMUSCULAR; INTRAVENOUS; SUBCUTANEOUS
Status: DISCONTINUED | OUTPATIENT
Start: 2021-10-29 | End: 2021-10-31 | Stop reason: HOSPADM

## 2021-10-29 RX ADMIN — ENOXAPARIN SODIUM 40 MG: 40 INJECTION SUBCUTANEOUS at 21:33

## 2021-10-29 RX ADMIN — INSULIN ASPART 1 UNITS: 100 INJECTION, SOLUTION INTRAVENOUS; SUBCUTANEOUS at 13:22

## 2021-10-29 RX ADMIN — METOPROLOL SUCCINATE 200 MG: 100 TABLET, FILM COATED, EXTENDED RELEASE ORAL at 09:30

## 2021-10-29 RX ADMIN — METFORMIN HYDROCHLORIDE 500 MG: 500 TABLET ORAL at 09:29

## 2021-10-29 RX ADMIN — FENTANYL 1 PATCH: 12 PATCH TRANSDERMAL at 13:23

## 2021-10-29 RX ADMIN — ATORVASTATIN CALCIUM 40 MG: 40 TABLET, FILM COATED ORAL at 09:30

## 2021-10-29 RX ADMIN — ACETAMINOPHEN 975 MG: 325 TABLET ORAL at 12:54

## 2021-10-29 RX ADMIN — AMLODIPINE BESYLATE 10 MG: 5 TABLET ORAL at 09:29

## 2021-10-29 RX ADMIN — LISINOPRIL 40 MG: 20 TABLET ORAL at 09:26

## 2021-10-29 RX ADMIN — METFORMIN HYDROCHLORIDE 500 MG: 500 TABLET ORAL at 18:03

## 2021-10-29 RX ADMIN — INSULIN ASPART 1 UNITS: 100 INJECTION, SOLUTION INTRAVENOUS; SUBCUTANEOUS at 09:25

## 2021-10-29 RX ADMIN — ACETAMINOPHEN 975 MG: 325 TABLET ORAL at 04:56

## 2021-10-29 RX ADMIN — INSULIN ASPART 1 UNITS: 100 INJECTION, SOLUTION INTRAVENOUS; SUBCUTANEOUS at 17:33

## 2021-10-29 RX ADMIN — METOPROLOL SUCCINATE 100 MG: 100 TABLET, FILM COATED, EXTENDED RELEASE ORAL at 21:34

## 2021-10-29 RX ADMIN — HUMAN INSULIN 24 UNITS: 100 INJECTION, SUSPENSION SUBCUTANEOUS at 09:25

## 2021-10-29 RX ADMIN — HUMAN INSULIN 24 UNITS: 100 INJECTION, SUSPENSION SUBCUTANEOUS at 18:02

## 2021-10-29 RX ADMIN — ASPIRIN 81 MG CHEWABLE TABLET 81 MG: 81 TABLET CHEWABLE at 09:30

## 2021-10-29 ASSESSMENT — ACTIVITIES OF DAILY LIVING (ADL)
ADLS_ACUITY_SCORE: 13
ADLS_ACUITY_SCORE: 11
ADLS_ACUITY_SCORE: 11
ADLS_ACUITY_SCORE: 13
ADLS_ACUITY_SCORE: 15
ADLS_ACUITY_SCORE: 13
ADLS_ACUITY_SCORE: 11
ADLS_ACUITY_SCORE: 11
ADLS_ACUITY_SCORE: 13
ADLS_ACUITY_SCORE: 15
ADLS_ACUITY_SCORE: 13
ADLS_ACUITY_SCORE: 11
ADLS_ACUITY_SCORE: 11

## 2021-10-29 NOTE — PLAN OF CARE
Problem: Gas Exchange Impaired  Goal: Optimal Gas Exchange  Intervention: Optimize Oxygenation and Ventilation  Recent Flowsheet Documentation  Taken 10/29/2021 1100 by Ho Balderrama RN  Head of Bed (HOB) Positioning: [facility] [place] lowered   No sob. Has been using incentive spirometry well. Able to blow up to 2000 kika

## 2021-10-29 NOTE — PROGRESS NOTES
General Surgery Progress Note  Hospital Day # 1    Subjective:   CC:rib fractures  Status:slightly more pain today in the right posterior chest, otherwise doing well    Vitals:    10/28/21 1951 10/28/21 2100 10/29/21 0031 10/29/21 0844   BP: (!) 159/84 (!) 165/84 (!) 143/79 (!) 167/78   BP Location: Left arm  Left arm Left arm   Pulse: 75 74 72 66   Resp: 18  18 18   Temp: 99.2  F (37.3  C)  98.9  F (37.2  C) 98.7  F (37.1  C)   TempSrc: Oral  Oral Oral   SpO2: 93% 92% 94% 93%   Weight:       Height:           Physical Exam:  General: NAD, pleasant  CT- no leak noted with cough    Recent Labs   Lab 10/29/21  1024   WBC 8.2   HGB 10.4*   HCT 32.3*          Recent Labs   Lab 10/29/21  1024      CO2 25   BUN 25       Assessment: right rib fractures    Plan: patient does not want oral or IV medications. Will add transdermal pain patch  -chest tube to wall suction today and will convert to waterseal tomorrow  -continue with aggressive pulmonary toilet to prevent pna.   -will monitor    Eddi Watson DO [facility]  [phone]

## 2021-10-29 NOTE — PROGRESS NOTES
[facility] Progress Note    Assessment/Plan  Active Problems:    Traumatic pneumothorax, initial encounter    Fall, initial encounter    Closed fracture of multiple ribs of right side, initial encounter    Contact with powered lawnmower as cause of accidental injury, initial encounter    Senthil Castillo is a 81 year old male admitted on 10/28/2021 for PTX s/p chest tube.      Multiple fractures including rib and clavicular in setting of trauma  PTX s/p pigtail catheter to suction  Displaced, comminuted fracture of right clavicle  Patient with recent trauma. Patient stable from this standpoint. Will monitor closely. Patient was not down for an extended period of time but will considering crushing trauma, will monitor kidney function closely. Will also not give morphine in setting of CKD. CXR showing no residual ptx with chest tube in place on right. Left lung wnl. CT head and neck negative.  - Surgery consult: managing right chest tube to suction today, waterseal tomorrow, aggressive pulm toilet, pain patch  - Tylenol q8 hours prn  - Oxycodone 2.5 mg m2pydrs prn  - Incentive spirometer     Asymmetrical leg swelling-resolved  Occurred after recent trauma, denies history of heart failure. No sob prior to recent trauma. Denies any pain or redness. US LE negative for DVT. Likely related to trauma.  - Monitor     T2DM  Hgb A1c 8.0 on 9/29/21. Is on NPH 70/30 30 units bid. BG this am 153, 162.  - NPH 70/30 24 units bid  - Continue Metformin  - MD SSI     HTN  HLD  Continue PTA amlodipine, lisinopril 40 mg daily, metoprolol  mg in am and 100 mg at night.   -Hypertensive in setting of pain. Surgeon ordered pain patch, expect bps to downtrend    CKD  Baseline appears to be around 1.6, creatinine on admission is 1.65. Cr 1.57 this am.  -Monitor    # Platelet Defect: home medication list includes an antiplatelet medication . Platelets 277 this am, wnl.     Diet:  Diabetic  DVT Prophylaxis: Enoxaparin  (Lovenox) SQ  Jameson Catheter: Not present  Central Lines: None  Code Status:   Full code    Subjective  Patient in more pain today. Does not want to take narcotics. He is waiting for his tylenol dose. All questions answered.     Objective    Vital signs in last 24 hours Temp:  [98.7  F (37.1  C)-99.2  F (37.3  C)] 98.7  F (37.1  C)  Pulse:  [66-79] 66  Resp:  [18] 18  BP: (143-202)/() 167/78  SpO2:  [91 %-94 %] 93 %   220 lbs 0 oz    Intake/Output last 3 shift I/O last 3 completed shifts:  In: 180 [P.O.:180]  Out: 270 [Urine:250; Chest Tube:20]    Intake/Output this shift:No intake/output data recorded.    Physical Exam  General appearance: alert, appears stated age, cooperative and no distress.  Head: Normocephalic, without obvious abnormality, atraumatic.  Eyes: conjunctivae/corneas clear.  Throat: MMM.  Lungs: No increased work of breathing, good air exchange, clear to auscultation bilaterally, no crackles or wheezing  Heart: regular rate and rhythm, no murmurs.  Back: mild bruising in postero-axillary region right side.   Abdomen: soft, non-tender, non-distended.  Extremities: extremities normal, atraumatic, no cyanosis or edema,  DP pulses palpable bilaterally.  Skin: Skin color, texture, turgor normal. No rashes or lesions.  Neurologic: Alert and oriented x3, normal strength and tone.  Psychiatric: mood and affect appropriate.    Pertinent Labs and Pertinent Radiology   Lab Results: personally reviewed.     Radiology Results: Personally reviewed image/s and impression/s    Precepted patient with Dr. Micaela Floyd.    Tricia Mclaughlin MD  [facility], PGY-3  Pager # [phone]

## 2021-10-29 NOTE — PLAN OF CARE
[id number] Shift Summary     A&Ox4. Denies pain & the need for pain meds. Right chest tube patent, -20cm to wall suction. Chest tube clamps placed in room. 02 sats 92-94% on RA. RUE has limited ROM. Educated pt about IS use. +1 edema to RLE. Urinal at bedside.     Carmina Junior RN    Problem: Gas Exchange Impaired  Goal: Optimal Gas Exchange  Outcome: No Change  Intervention: Optimize Oxygenation and Ventilation  Recent Flowsheet Documentation  Taken 10/28/2021 1951 by Carmina Junior, RN  Head of Bed (HOB) Positioning: HOB at 30 degrees     Problem: Fall Injury Risk  Goal: Absence of Fall and Fall-Related Injury  Outcome: Improving  Intervention: Promote Injury-Free Environment  Recent Flowsheet Documentation  Taken 10/28/2021 1951 by Carmina Junior, RN  Safety Promotion/Fall Prevention: bed alarm on

## 2021-10-29 NOTE — PLAN OF CARE
Problem: Adult Inpatient Plan of Care  Goal: Patient-Specific Goal (Individualized)  Outcome: Improving  Denied pain or sob at rest.      Problem: Adult Inpatient Plan of Care  Goal: Optimal Comfort and Wellbeing  Outcome: Improving  Calm and relaxed. Able to walk to BR with stand by assist.

## 2021-10-29 NOTE — PLAN OF CARE
Denies nausea. C/o pain but is declining PRN medications. Finally agreed to PRN APAP after extensive encouragement.   Right chest tube placement. -20cm to wall suction. Chest tube clamps at bedside. Continuous pulse ox in place and currently maintaining O2 sats of mid 90's on RA.     Up with standby assist. Bed alarm on for safety. Call light within reach. Able to make need known.

## 2021-10-30 ENCOUNTER — APPOINTMENT (OUTPATIENT)
Dept: RADIOLOGY | Facility: HOSPITAL | Age: 81
DRG: 200 | End: 2021-10-30
Attending: PHYSICIAN ASSISTANT
Payer: COMMERCIAL

## 2021-10-30 ENCOUNTER — APPOINTMENT (OUTPATIENT)
Dept: PHYSICAL THERAPY | Facility: HOSPITAL | Age: 81
DRG: 200 | End: 2021-10-30
Payer: COMMERCIAL

## 2021-10-30 LAB
ANION GAP SERPL CALCULATED.3IONS-SCNC: 10 MMOL/L (ref 5–18)
BUN SERPL-MCNC: 27 MG/DL (ref 8–28)
CALCIUM SERPL-MCNC: 9.1 MG/DL (ref 8.5–10.5)
CHLORIDE BLD-SCNC: 106 MMOL/L (ref 98–107)
CO2 SERPL-SCNC: 23 MMOL/L (ref 22–31)
CREAT SERPL-MCNC: 1.55 MG/DL (ref 0.7–1.3)
ERYTHROCYTE [DISTWIDTH] IN BLOOD BY AUTOMATED COUNT: 12.7 % (ref 10–15)
GFR SERPL CREATININE-BSD FRML MDRD: 42 ML/MIN/1.73M2
GLUCOSE BLD-MCNC: 141 MG/DL (ref 70–125)
GLUCOSE BLDC GLUCOMTR-MCNC: 124 MG/DL (ref 70–99)
GLUCOSE BLDC GLUCOMTR-MCNC: 129 MG/DL (ref 70–99)
GLUCOSE BLDC GLUCOMTR-MCNC: 144 MG/DL (ref 70–99)
GLUCOSE BLDC GLUCOMTR-MCNC: 82 MG/DL (ref 70–99)
HCT VFR BLD AUTO: 33.4 % (ref 40–53)
HGB BLD-MCNC: 10.5 G/DL (ref 13.3–17.7)
MCH RBC QN AUTO: 29.9 PG (ref 26.5–33)
MCHC RBC AUTO-ENTMCNC: 31.4 G/DL (ref 31.5–36.5)
MCV RBC AUTO: 95 FL (ref 78–100)
PLATELET # BLD AUTO: 297 10E3/UL (ref 150–450)
POTASSIUM BLD-SCNC: 4 MMOL/L (ref 3.5–5)
RBC # BLD AUTO: 3.51 10E6/UL (ref 4.4–5.9)
SODIUM SERPL-SCNC: 139 MMOL/L (ref 136–145)
WBC # BLD AUTO: 8 10E3/UL (ref 4–11)

## 2021-10-30 PROCEDURE — 250N000013 HC RX MED GY IP 250 OP 250 PS 637: Performed by: STUDENT IN AN ORGANIZED HEALTH CARE EDUCATION/TRAINING PROGRAM

## 2021-10-30 PROCEDURE — 250N000011 HC RX IP 250 OP 636: Performed by: STUDENT IN AN ORGANIZED HEALTH CARE EDUCATION/TRAINING PROGRAM

## 2021-10-30 PROCEDURE — 80048 BASIC METABOLIC PNL TOTAL CA: CPT | Performed by: STUDENT IN AN ORGANIZED HEALTH CARE EDUCATION/TRAINING PROGRAM

## 2021-10-30 PROCEDURE — 120N000001 HC R&B MED SURG/OB

## 2021-10-30 PROCEDURE — 99232 SBSQ HOSP IP/OBS MODERATE 35: CPT | Mod: GC | Performed by: STUDENT IN AN ORGANIZED HEALTH CARE EDUCATION/TRAINING PROGRAM

## 2021-10-30 PROCEDURE — 71045 X-RAY EXAM CHEST 1 VIEW: CPT

## 2021-10-30 PROCEDURE — 97116 GAIT TRAINING THERAPY: CPT | Mod: GP

## 2021-10-30 PROCEDURE — 99231 SBSQ HOSP IP/OBS SF/LOW 25: CPT | Performed by: PHYSICIAN ASSISTANT

## 2021-10-30 PROCEDURE — 85027 COMPLETE CBC AUTOMATED: CPT | Performed by: STUDENT IN AN ORGANIZED HEALTH CARE EDUCATION/TRAINING PROGRAM

## 2021-10-30 PROCEDURE — 36415 COLL VENOUS BLD VENIPUNCTURE: CPT | Performed by: STUDENT IN AN ORGANIZED HEALTH CARE EDUCATION/TRAINING PROGRAM

## 2021-10-30 PROCEDURE — 97530 THERAPEUTIC ACTIVITIES: CPT | Mod: GP

## 2021-10-30 PROCEDURE — 97162 PT EVAL MOD COMPLEX 30 MIN: CPT | Mod: GP

## 2021-10-30 RX ADMIN — METFORMIN HYDROCHLORIDE 500 MG: 500 TABLET ORAL at 17:29

## 2021-10-30 RX ADMIN — METOPROLOL SUCCINATE 200 MG: 100 TABLET, FILM COATED, EXTENDED RELEASE ORAL at 08:10

## 2021-10-30 RX ADMIN — ACETAMINOPHEN 975 MG: 325 TABLET ORAL at 17:33

## 2021-10-30 RX ADMIN — ATORVASTATIN CALCIUM 40 MG: 40 TABLET, FILM COATED ORAL at 08:09

## 2021-10-30 RX ADMIN — ENOXAPARIN SODIUM 40 MG: 40 INJECTION SUBCUTANEOUS at 20:06

## 2021-10-30 RX ADMIN — HUMAN INSULIN 24 UNITS: 100 INJECTION, SUSPENSION SUBCUTANEOUS at 08:29

## 2021-10-30 RX ADMIN — HUMAN INSULIN 24 UNITS: 100 INJECTION, SUSPENSION SUBCUTANEOUS at 17:29

## 2021-10-30 RX ADMIN — ASPIRIN 81 MG CHEWABLE TABLET 81 MG: 81 TABLET CHEWABLE at 08:09

## 2021-10-30 RX ADMIN — INSULIN ASPART 1 UNITS: 100 INJECTION, SOLUTION INTRAVENOUS; SUBCUTANEOUS at 08:09

## 2021-10-30 RX ADMIN — ACETAMINOPHEN 975 MG: 325 TABLET ORAL at 08:21

## 2021-10-30 RX ADMIN — LISINOPRIL 40 MG: 20 TABLET ORAL at 08:09

## 2021-10-30 RX ADMIN — AMLODIPINE BESYLATE 10 MG: 5 TABLET ORAL at 08:09

## 2021-10-30 RX ADMIN — METFORMIN HYDROCHLORIDE 500 MG: 500 TABLET ORAL at 08:30

## 2021-10-30 RX ADMIN — METOPROLOL SUCCINATE 100 MG: 100 TABLET, FILM COATED, EXTENDED RELEASE ORAL at 21:17

## 2021-10-30 ASSESSMENT — ACTIVITIES OF DAILY LIVING (ADL)
ADLS_ACUITY_SCORE: 15
ADLS_ACUITY_SCORE: 17
ADLS_ACUITY_SCORE: 15
ADLS_ACUITY_SCORE: 16
ADLS_ACUITY_SCORE: 16
ADLS_ACUITY_SCORE: 15
ADLS_ACUITY_SCORE: 16
ADLS_ACUITY_SCORE: 18
ADLS_ACUITY_SCORE: 15
ADLS_ACUITY_SCORE: 17
ADLS_ACUITY_SCORE: 15
ADLS_ACUITY_SCORE: 15
ADLS_ACUITY_SCORE: 17
ADLS_ACUITY_SCORE: 15
ADLS_ACUITY_SCORE: 16
ADLS_ACUITY_SCORE: 17
ADLS_ACUITY_SCORE: 15
ADLS_ACUITY_SCORE: 16
ADLS_ACUITY_SCORE: 17

## 2021-10-30 NOTE — PLAN OF CARE
Problem: Gas Exchange Impaired  Goal: Optimal Gas Exchange  Outcome: Improving     Problem: Risk for Delirium  Goal: Improved Sleep  Outcome: Improving     Slept well overnight. No acute events. Chest tube intact. No leak. Denies pain. SBA at bedside to use urinal.

## 2021-10-30 NOTE — PROGRESS NOTES
10/30/21 1500   Quick Adds   Type of Visit Initial PT Evaluation   Living Environment   People in home spouse   Current Living Arrangements house   Home Accessibility stairs to enter home;stairs within home   Number of Stairs, Main Entrance 4   Stair Railings, Main Entrance railing on left side (ascending)   Number of Stairs, Within Home, Primary 8   Stair Railings, Within Home, Primary railing on left side (ascending);railing on right side (ascending)   Transportation Anticipated family or friend will provide   Self-Care   Usual Activity Tolerance good   Current Activity Tolerance moderate   Equipment Currently Used at Home none   Disability/Function   Walking or Climbing Stairs Difficulty no   Dressing/Bathing Difficulty no   Toileting issues no   Doing Errands Independently Difficulty (such as shopping) yes   Errands Management drives   General Information   Onset of Illness/Injury or Date of Surgery 10/28/21   Referring Physician Michelle daley Timothy   Patient/Family Therapy Goals Statement (PT) go home tomorrow   Pertinent History of Current Problem (include personal factors and/or comorbidities that impact the POC) fall with multiple rib fx and R clavicle fx   Existing Precautions/Restrictions fall;weight bearing  (NWB R ,sling on )   Weight-Bearing Status - RUE nonweight-bearing   Weight-Bearing Status - LLE full weight-bearing   Weight-Bearing Status - RLE full weight-bearing   Cognition   Orientation Status (Cognition) oriented x 4   Pain Assessment   Patient Currently in Pain Yes, see Vital Sign flowsheet   Range of Motion (ROM)   ROM Quick Adds ROM WFL   Strength   Manual Muscle Testing Quick Adds Strength WFL   Bed Mobility   Supine-Sit [place] (Bed Mobility) moderate assist (50% patient effort)   Bed Mobility Limitations decreased ability to use arms for pushing/pulling   Comment (Bed Mobility) HOB up ,sling on    Sit-Stand Transfer   Sit-Stand [place] (Transfers) contact guard    Gait/Stairs (Locomotion)   [place] Level (Gait) contact guard   Assistive Device (Gait) other (see comments)   Distance in Feet (Required for LE Total Joints) 200   Pattern (Gait) step-through   Deviations/Abnormal Patterns (Gait) gait speed decreased   Maintains Weight-bearing Status (Gait) able to maintain   Handrail Location (Stairs) left side (descending);none   Number of Steps (Stairs) 11   Comment (Gait/Stairs) HHA on L going up and hand rail on L going down backwards   Balance   Balance Comments donned sling R arm while sitting on EOB   Clinical Impression   Criteria for Skilled Therapeutic Intervention yes, treatment indicated   PT Diagnosis (PT) ipmaired functional mobility   Influenced by the following impairments pain    Functional limitations due to impairments bed mobility    Clinical Presentation Stable/Uncomplicated   Clinical Presentation Rationale pt presents as med diagnosed   Clinical Decision Making (Complexity) moderate complexity   Therapy Frequency (PT) Daily   Predicted Duration of Therapy Intervention (days/wks) 3   Planned Therapy Interventions (PT) bed mobility training;gait training;stair training;strengthening;transfer training   PT Discharge Planning    PT Discharge Recommendation (DC Rec) home with assist   PT Rationale for DC Rec pt moves fairly well ,can return home with assist from souse and some adjustments in living arrangements-recommended recliner to sleep in vs bed   Total Evaluation Time   Total Evaluation Time (Minutes) 15

## 2021-10-30 NOTE — PROGRESS NOTES
General Surgery Progress Note:    Hospital Day # 2    ASSESSMENT:   1. Closed fracture of multiple ribs of right side, initial encounter    2. Traumatic pneumothorax, initial encounter    3. Fall, initial encounter    4. Contact with powered lawnmower as cause of accidental injury, initial encounter    5. Closed nondisplaced fracture of acromial end of right clavicle, initial encounter        Senthil Castillo is a 81 year old male with right rib fx and pneumo with chest tube in place    CXR reviewed - no pneumo, chest tube appears to have backed out compared to yesterday    PLAN:   -Water seal chest tube, likely can come out tomorrow  -Pain control  -IS    Scott Francois PA-C  Pager - [phone]  Phone - [phone]   General Surgery    SUBJECTIVE:   Senthil Castillo is doing ok, pain improving, no SOB    Patient Vitals for the past 24 hrs:   BP Temp Temp src Pulse Resp SpO2   10/30/21 0738 (!) 142/80 98.4  F (36.9  C) Oral 73 20 95 %   10/30/21 0000 (!) 164/74 99  F (37.2  C) Oral 69 18 93 %   10/29/21 1948 139/66 99.3  F (37.4  C) Oral 72 18 93 %   10/29/21 1550 120/66 98.9  F (37.2  C) Oral 77 18 93 %       Physical Exam:  General: NAD, pleasant  Chest; chest tube in place, serosanguinous output, no air leak    Lab Results   Component Value Date    WBC 8.0 10/30/2021    HGB 10.5 10/30/2021    HGB 11.9 01/13/2021    HCT 33.4 10/30/2021    HCT 40.5 01/13/2021    MCV 95 10/30/2021    .1 01/13/2021     10/30/2021     INR/Prothrombin Time  Recent Labs   Lab 10/30/21  0737      CO2 23   BUN 27     Lab Results   Component Value Date    ALT 18.7 11/07/2019    AST 18.7 11/07/2019    ALKPHOS 76.4 11/07/2019

## 2021-10-30 NOTE — PLAN OF CARE
Problem: Adult Inpatient Plan of Care  Goal: Absence of Hospital-Acquired Illness or Injury  Intervention: Prevent Skin Injury  Recent Flowsheet Documentation  Taken 10/30/2021 1051 by Ho Balderrama RN  Body Position: position changed independently     Problem: Fall Injury Risk  Goal: Absence of Fall and Fall-Related Injury  Outcome: No Change     Problem: Gas Exchange Impaired  Goal: Optimal Gas Exchange  Outcome: No Change   No SOB. Chest tube is on water seal setting. Site is intermittently leaking with serosanguinuous drainage depending on position and movement. Informed surgery team. Using IS well.

## 2021-10-30 NOTE — PROGRESS NOTES
[facility] Progress Note    Assessment/Plan  Active Problems:    Traumatic pneumothorax, initial encounter    Fall, initial encounter    Closed fracture of multiple ribs of right side, initial encounter    Contact with powered lawnmower as cause of accidental injury, initial encounter    Senthil Castillo is a 81 year old male admitted on 10/28/2021 for PTX s/p chest tube, pain management of multiple rib fractures.     Multiple fractures including rib and clavicular in setting of trauma  PTX s/p pigtail catheter to suction  Displaced, comminuted fracture of right clavicle  . CXR showing no residual ptx with chest tube in place on right. Left lung wnl. CT head and neck negative. Stable today, pain moderately managed with tylenol and ice.  - Surgery consult: managing R chest tube, plan for waterseal today, aggressive pulm toilet, pain patch  - Tylenol q8 hours prn  - Oxycodone 2.5 mg l7mgmfc prn  - Incentive spirometer  -PT for ambulation and assessment, appreciate recommendations     Asymmetrical leg swelling-resolved  Occurred after recent trauma, denies history of heart failure. No sob prior to recent trauma. Denies any pain or redness. US LE negative for DVT. Likely related to trauma.  - Monitor     T2DM  Hgb A1c 8.0 on 9/29/21. Is on NPH 70/30 30 units bid. BG this am 153, 162.  - NPH 70/30 24 units bid  - Continue Metformin  - MD SSI     HTN  HLD  Continue PTA amlodipine, lisinopril 40 mg daily, metoprolol  mg in am and 100 mg at night.     CKD  Baseline appears to be around 1.6, creatinine on admission is 1.65. Cr 1.57 stable.  -Monitor       Diet:  Diabetic  DVT Prophylaxis: Enoxaparin (Lovenox) SQ  Jameson Catheter: Not present  Central Lines: None  Code Status:   Full code    Subjective  Waldemar reports stable pain today. Tolerating with tylenol and ice. Feels better when resting, alternates between bed and chair. Understands need to continue using IS to prevent atelectasis/possible  pneumonia. Overall feeling stable. No additional concerns.    Objective    Vital signs in last 24 hours Temp:  [98.4  F (36.9  C)-99.3  F (37.4  C)] 98.4  F (36.9  C)  Pulse:  [69-77] 73  Resp:  [18-20] 20  BP: (120-164)/(66-80) 142/80  SpO2:  [93 %-95 %] 95 %   220 lbs 0 oz    Intake/Output last 3 shift I/O last 3 completed shifts:  In: 400 [P.O.:400]  Out: 425 [Urine:400; Chest Tube:25]    Intake/Output this shift:I/O this shift:  In: -   Out: 140 [Chest Tube:140]    Physical Exam  General appearance: alert, appears stated age, cooperative and no distress.  Head: Normocephalic, without obvious abnormality, atraumatic.  Eyes: conjunctivae/corneas clear.  Lungs: No increased work of breathing, good air movement without significant pain elicited, clear to auscultation bilaterally, no crackles or wheezing. Chest tube in place.  Heart: regular rate and rhythm, no murmurs.  Abdomen: soft, non-tender, non-distended.  Extremities: extremities normal, atraumatic, no cyanosis or edema,  DP pulses palpable bilaterally.  Skin: Skin color, texture, turgor normal. No rashes or lesions.  Neurologic: Alert and oriented x3, normal strength and tone.  Psychiatric: mood and affect appropriate.    Pertinent Labs and Pertinent Radiology   Lab Results: personally reviewed.     Radiology Results: Personally reviewed image/s and impression/s    Precepted patient with Dr. Martinez.    Marsha Fink MD  Pager # [phone]  [facility] [place] [facility]

## 2021-10-30 NOTE — PLAN OF CARE
Problem: Adult Inpatient Plan of Care  Goal: Absence of Hospital-Acquired Illness or Injury  Outcome: Improving  Intervention: Identify and Manage Fall Risk  Recent Flowsheet Documentation  Taken 10/29/2021 2004 by Jennifer Tolbert, RN  Safety Promotion/Fall Prevention: toileting scheduled  Intervention: Prevent and Manage VTE (Venous Thromboembolism) Risk  Recent Flowsheet Documentation  Taken 10/29/2021 2004 by Jennifer Tolbert, RN  VTE Prevention/Management: pneumatic compression device  Goal: Optimal Comfort and Wellbeing  Outcome: Improving     Problem: Risk for Delirium  Goal: Optimal Coping  Outcome: Improving     Problem: Gas Exchange Impaired  Goal: Optimal Gas Exchange  Outcome: Improving

## 2021-10-31 ENCOUNTER — APPOINTMENT (OUTPATIENT)
Dept: RADIOLOGY | Facility: HOSPITAL | Age: 81
DRG: 200 | End: 2021-10-31
Payer: COMMERCIAL

## 2021-10-31 ENCOUNTER — APPOINTMENT (OUTPATIENT)
Dept: PHYSICAL THERAPY | Facility: HOSPITAL | Age: 81
DRG: 200 | End: 2021-10-31
Payer: COMMERCIAL

## 2021-10-31 ENCOUNTER — APPOINTMENT (OUTPATIENT)
Dept: RADIOLOGY | Facility: HOSPITAL | Age: 81
DRG: 200 | End: 2021-10-31
Attending: ORTHOPAEDIC SURGERY
Payer: COMMERCIAL

## 2021-10-31 ENCOUNTER — APPOINTMENT (OUTPATIENT)
Dept: OCCUPATIONAL THERAPY | Facility: HOSPITAL | Age: 81
DRG: 200 | End: 2021-10-31
Payer: COMMERCIAL

## 2021-10-31 VITALS
SYSTOLIC BLOOD PRESSURE: 186 MMHG | OXYGEN SATURATION: 96 % | HEART RATE: 63 BPM | TEMPERATURE: 98.7 F | BODY MASS INDEX: 30.8 KG/M2 | HEIGHT: 71 IN | RESPIRATION RATE: 20 BRPM | DIASTOLIC BLOOD PRESSURE: 83 MMHG | WEIGHT: 220 LBS

## 2021-10-31 LAB
CREAT SERPL-MCNC: 1.34 MG/DL (ref 0.7–1.3)
ERYTHROCYTE [DISTWIDTH] IN BLOOD BY AUTOMATED COUNT: 12.7 % (ref 10–15)
GFR SERPL CREATININE-BSD FRML MDRD: 50 ML/MIN/1.73M2
GLUCOSE BLDC GLUCOMTR-MCNC: 213 MG/DL (ref 70–99)
GLUCOSE BLDC GLUCOMTR-MCNC: 96 MG/DL (ref 70–99)
HCT VFR BLD AUTO: 31.1 % (ref 40–53)
HGB BLD-MCNC: 10 G/DL (ref 13.3–17.7)
MCH RBC QN AUTO: 30.1 PG (ref 26.5–33)
MCHC RBC AUTO-ENTMCNC: 32.2 G/DL (ref 31.5–36.5)
MCV RBC AUTO: 94 FL (ref 78–100)
PLATELET # BLD AUTO: 273 10E3/UL (ref 150–450)
RBC # BLD AUTO: 3.32 10E6/UL (ref 4.4–5.9)
WBC # BLD AUTO: 6.9 10E3/UL (ref 4–11)

## 2021-10-31 PROCEDURE — 97535 SELF CARE MNGMENT TRAINING: CPT | Mod: GO

## 2021-10-31 PROCEDURE — 97530 THERAPEUTIC ACTIVITIES: CPT | Mod: GP

## 2021-10-31 PROCEDURE — 250N000013 HC RX MED GY IP 250 OP 250 PS 637: Performed by: STUDENT IN AN ORGANIZED HEALTH CARE EDUCATION/TRAINING PROGRAM

## 2021-10-31 PROCEDURE — 99231 SBSQ HOSP IP/OBS SF/LOW 25: CPT | Performed by: PHYSICIAN ASSISTANT

## 2021-10-31 PROCEDURE — 82565 ASSAY OF CREATININE: CPT | Performed by: STUDENT IN AN ORGANIZED HEALTH CARE EDUCATION/TRAINING PROGRAM

## 2021-10-31 PROCEDURE — 73000 X-RAY EXAM OF COLLAR BONE: CPT | Mod: RT,FY

## 2021-10-31 PROCEDURE — 85027 COMPLETE CBC AUTOMATED: CPT | Performed by: STUDENT IN AN ORGANIZED HEALTH CARE EDUCATION/TRAINING PROGRAM

## 2021-10-31 PROCEDURE — 36415 COLL VENOUS BLD VENIPUNCTURE: CPT | Performed by: STUDENT IN AN ORGANIZED HEALTH CARE EDUCATION/TRAINING PROGRAM

## 2021-10-31 PROCEDURE — 97165 OT EVAL LOW COMPLEX 30 MIN: CPT | Mod: GO

## 2021-10-31 PROCEDURE — 99238 HOSP IP/OBS DSCHRG MGMT 30/<: CPT | Mod: GC | Performed by: STUDENT IN AN ORGANIZED HEALTH CARE EDUCATION/TRAINING PROGRAM

## 2021-10-31 PROCEDURE — 71045 X-RAY EXAM CHEST 1 VIEW: CPT | Mod: FY

## 2021-10-31 PROCEDURE — 97116 GAIT TRAINING THERAPY: CPT | Mod: GP

## 2021-10-31 RX ADMIN — ATORVASTATIN CALCIUM 40 MG: 40 TABLET, FILM COATED ORAL at 08:42

## 2021-10-31 RX ADMIN — METOPROLOL SUCCINATE 200 MG: 100 TABLET, FILM COATED, EXTENDED RELEASE ORAL at 08:43

## 2021-10-31 RX ADMIN — LISINOPRIL 40 MG: 20 TABLET ORAL at 08:43

## 2021-10-31 RX ADMIN — METFORMIN HYDROCHLORIDE 500 MG: 500 TABLET ORAL at 09:52

## 2021-10-31 RX ADMIN — AMLODIPINE BESYLATE 10 MG: 5 TABLET ORAL at 08:43

## 2021-10-31 RX ADMIN — ACETAMINOPHEN 975 MG: 325 TABLET ORAL at 06:34

## 2021-10-31 RX ADMIN — INSULIN ASPART 2 UNITS: 100 INJECTION, SOLUTION INTRAVENOUS; SUBCUTANEOUS at 13:09

## 2021-10-31 RX ADMIN — ASPIRIN 81 MG CHEWABLE TABLET 81 MG: 81 TABLET CHEWABLE at 08:42

## 2021-10-31 RX ADMIN — HUMAN INSULIN 24 UNITS: 100 INJECTION, SUSPENSION SUBCUTANEOUS at 09:54

## 2021-10-31 ASSESSMENT — ACTIVITIES OF DAILY LIVING (ADL)
ADLS_ACUITY_SCORE: 16
IADL_COMMENTS: INDEP. ADLS
ADLS_ACUITY_SCORE: 16

## 2021-10-31 NOTE — PROVIDER NOTIFICATION
Pt called desk requesting blanket and PRN tylenol for pain. When entered room pt reported his pain increased to a 6/10- given tylenol and then pt reported his chest tube had come out while he stood at the bedside-- chest tube was out. No drainage or bleeding from site. Securing device still intact. Covered with vaseline gauze, gauze and tape. Surgery contacted and spoke with Dr. Miller-- will schedule x-ray for later today and have surgeon follow up. Plan had been to remove today. Pt lung sounds diminished left side, clear right side. Denies shortness of breath or increased pressure.
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
## 2021-10-31 NOTE — PLAN OF CARE
Problem: Pain (Orthopaedic Fracture)  Goal: Acceptable Pain Control  Outcome: Improving     Problem: Gas Exchange Impaired  Goal: Optimal Gas Exchange  Outcome: Improving     Slept well. Reported minor ribcage and shoulder pain- declines PRN's. Fentanyl patch in place. Surgery following- chest tube to water seal, serosanguinous output, daily x-rays. Per surgery note may be removed today. Pt denies shortness of breath, VSS.

## 2021-10-31 NOTE — PROGRESS NOTES
ORTHOPEDIC CONSULTATION    IMPRESSION:   Comminuted right clavicle fracture     PLAN:  1.  Obtain dedicated right clavicle x-rays as only partially seen on CT scan.  2.  Sling for comfort, active range of motion as tolerated.  3.  No urgent orthopedic care indicated.  Likely nonoperative management.  Okay to be discharged with outpatient follow-up if otherwise stable.  Will await formal x-rays.    Thank you for this consultation allowing [facility] to participate in the care of your patient.  _____________________________________________________    CHIEF COMPLAINT: Right clavicle fracture      HISTORY OF PRESENT ILLNESS:  The patient is seen in orthopedic consultation at the request of Lino Akins MD.  The patient is a 81 year old male who sustained a crush injury from a riding lawnmower 3 days ago.  Sustained multiple rib fractures and pneumothorax.  Chest tube was placed.  Clavicle fracture was partially noted on initial imaging but not followed up with.  Patient is improving.  Chest tube now out.  Breathing well on room air.  Having right shoulder pain which is mild at rest and gets a little more severe with active shoulder movement.      PAST MEDICAL HISTORY:   Past Medical History:   Diagnosis Date     CAD (coronary artery disease)      CKD (chronic kidney disease)      Diabetes (H)      HLD (hyperlipidemia)      Hypertension        ALLERGIES:   Pioglitazone    MEDICATIONS ON ADMISSION:  Medications were reviewed.  They do include:   Medications Prior to Admission   Medication Sig Dispense Refill Last Dose     amLODIPine (NORVASC) 10 MG tablet Take 1 tablet (10 mg) by mouth daily 90 tablet 1 10/28/2021 at Unknown time     aspirin (ASA) 325 MG tablet Take 1 tablet (325 mg) by mouth every other day (Patient taking differently: Take 162.5 mg by mouth daily ) 90 tablet 1 10/28/2021 at Unknown time     atorvastatin (LIPITOR) 40 MG tablet TAKE 1 TABLET BY MOUTH EVERY DAY 90 tablet 1 10/28/2021 at  Unknown time     insulin NPH-insulin regular MIX (NOVOLIN MIX VIAL) (70-30) 100 UNIT/ML Inject 30 Units Subcutaneous 2 times daily (with meals)    10/28/2021 at Unknown time     lisinopril (ZESTRIL) 40 MG tablet Take 1 tablet (40 mg) by mouth daily 90 tablet 1 10/28/2021 at Unknown time     magnesium 250 MG tablet Take 1 tablet by mouth At Bedtime   10/27/2021 at Unknown time     metFORMIN (GLUCOPHAGE) 500 MG tablet Take 1 tablet (500 mg) by mouth 2 times daily (with meals) 180 tablet 1 10/28/2021 at Unknown time     metoprolol succinate ER (TOPROL-XL) 100 MG 24 hr tablet TAKE 2 TABLETS BY MOUTH IN THE MORNING AND 1 TABLET AT NIGHT 270 tablet 0 10/28/2021 at Unknown time     blood glucose (ONETOUCH ULTRA) test strip Use up to 3 times daily 300 strip 1      insulin pen needle (BD ULTRA-FINE) 29G X 12.7MM miscellaneous Use twice daily 180 each 1      magnesium chloride 535 (64 Mg) MG TBEC CR tablet Take 1 tablet (535 mg) by mouth daily (Patient not taking: Reported on 10/29/2021) 90 tablet 1 Not Taking at Unknown time       SOCIAL HISTORY:    reports that he has never smoked. He has never used smokeless tobacco. He reports current alcohol use. He reports that he does not use drugs.     FAMILY HISTORY:  Family History   Problem Relation Age of Onset     Hypertension Mother      Diabetes Mother      Breast Cancer Mother      Cerebrovascular Disease Father        REVIEW OF SYSTEMS:   See Admission History and Physical     PHYSICAL EXAMINATION:  General: On examination, the patient is A&Ox3  SKIN/HAIR/NAILS: Mild swelling in the right clavicle area.  No tenting, abrasions or open wounds.  Pulses: Radial pulse intact.  Sensation: Distal  sensation intact.  Tenderness: Mild tenderness palpation of her right clavicle  ROM: Active right shoulder range of motion to 90 degrees with minimal pain.  Motor: Cardinal hand movements grossly intact.    Temp:  [97.7  F (36.5  C)-98.7  F (37.1  C)] 98.7  F (37.1  C)  Pulse:  [63-79]  63  Resp:  [20] 20  BP: (129-186)/(73-83) 186/83  SpO2:  [93 %-96 %] 96 %    RADIOGRAPHIC EVALUATION:   Multiple checks x-rays and CT chest do show a comminuted distal third clavicle fracture however the distal aspect of the fracture is not fully visualized.  Multiple rib fractures as well.    LABORATORY DATA:   CBC RESULTS:   Recent Labs   Lab Test 10/31/21  0738   WBC 6.9   RBC 3.32*   HGB 10.0*   HCT 31.1*   MCV 94   MCH 30.1   MCHC 32.2   RDW 12.7        Last Comprehensive Metabolic Panel:  Sodium   Date Value Ref Range Status   10/30/2021 139 136 - 145 mmol/L Final   06/03/2021 139.8 132.0 - 142.0 mmol/L Final     Potassium   Date Value Ref Range Status   10/30/2021 4.0 3.5 - 5.0 mmol/L Final   06/03/2021 5.6 (H) 3.2 - 4.6 mmol/L Final     Chloride   Date Value Ref Range Status   10/30/2021 106 98 - 107 mmol/L Final   06/03/2021 105.3 98.0 - 110.0 mmol/L Final     Carbon Dioxide   Date Value Ref Range Status   06/03/2021 27.2 20.0 - 32.0 mmol/L Final     Carbon Dioxide (CO2)   Date Value Ref Range Status   10/30/2021 23 22 - 31 mmol/L Final     Anion Gap   Date Value Ref Range Status   10/30/2021 10 5 - 18 mmol/L Final     Glucose   Date Value Ref Range Status   10/30/2021 141 (H) 70 - 125 mg/dL Final   06/03/2021 133.0 (H) 70.0 - 99.0 mg'dL Final     GLUCOSE BY METER POCT   Date Value Ref Range Status   10/31/2021 96 70 - 99 mg/dL Final     Urea Nitrogen   Date Value Ref Range Status   10/30/2021 27 8 - 28 mg/dL Final   06/03/2021 36.2 (H) 7.0 - 21.0 mg/dL Final     Creatinine   Date Value Ref Range Status   10/31/2021 1.34 (H) 0.70 - 1.30 mg/dL Final   06/03/2021 1.8 (H) 0.7 - 1.3 mg/dL Final     GFR Estimate   Date Value Ref Range Status   10/31/2021 50 (L) >60 mL/min/1.73m2 Final     Comment:     As of July 11, 2021, eGFR is calculated by the CKD-EPI creatinine equation, without race adjustment. eGFR can be influenced by muscle mass, exercise, and diet. The reported eGFR is an estimation only and is  only applicable if the renal function is stable.   06/03/2021 39.8 (L) >60.0 mL/min/1.7 m2 Final     Comment:     eGFR is calculated by the CKD-EPI creatinine equation, without race   adjustment. eGFR can be influenced by muscle mass, exercise, and diet. The   reported eGFR is an estimation only and is only applicable if the renal   function is stable.       Calcium   Date Value Ref Range Status   10/30/2021 9.1 8.5 - 10.5 mg/dL Final   06/03/2021 9.2 8.5 - 10.1 mg/dL Final     INR   Date Value Ref Range Status   10/28/2021 1.11 0.85 - 1.15 Final            Jarrell Hoang, DO

## 2021-10-31 NOTE — PLAN OF CARE
Occupational Therapy Discharge Summary    Reason for therapy discharge:    All goals and outcomes met, no further needs identified.    Progress towards therapy goal(s). See goals on Care Plan in [facility] electronic health record for goal details.  Goals met    Therapy recommendation(s):    No further therapy is recommended.  Mary Reyes, OTR/L  10/31/2021

## 2021-10-31 NOTE — PROGRESS NOTES
General Surgery Progress Note:    Hospital Day # 3    ASSESSMENT:   1. Closed fracture of multiple ribs of right side, initial encounter    2. Traumatic pneumothorax, initial encounter    3. Fall, initial encounter    4. Contact with powered lawnmower as cause of accidental injury, initial encounter    5. Closed nondisplaced fracture of acromial end of right clavicle, initial encounter        Senthil Castillo is a 81 year old male with right rib fx and pneumo, chest tube fell out this AM    CXR still pending    PLAN:   -If no pneumo on CXR, surgery will likely sign off. Will follow up on results once done.   -Pain control  -IS    Scott Francois PA-C  Pager - [phone]  Phone - [phone]   General Surgery    SUBJECTIVE:   Senthil Castillo is doing ok, pain improving, no SOB, chest tube fell out this AM, no change in symptoms    Patient Vitals for the past 24 hrs:   BP Temp Temp src Pulse Resp SpO2   10/31/21 0800 (!) 186/83 98.7  F (37.1  C) Oral 63 20 96 %   10/30/21 2358 (!) 164/77 97.7  F (36.5  C) Oral 79 20 93 %   10/30/21 2117 (!) 154/74 -- -- 68 -- 94 %   10/30/21 1552 129/73 98.3  F (36.8  C) Oral 74 20 93 %       Physical Exam:  General: NAD, pleasant  Chest; chest tube out, dressing in place    Lab Results   Component Value Date    WBC 8.0 10/30/2021    HGB 10.5 10/30/2021    HGB 11.9 01/13/2021    HCT 33.4 10/30/2021    HCT 40.5 01/13/2021    MCV 95 10/30/2021    .1 01/13/2021     10/30/2021     INR/Prothrombin Time  Recent Labs   Lab 10/30/21  0737      CO2 23   BUN 27     Lab Results   Component Value Date    ALT 18.7 11/07/2019    AST 18.7 11/07/2019    ALKPHOS 76.4 11/07/2019

## 2021-10-31 NOTE — PROGRESS NOTES
10/31/21 1350   Quick Adds   Type of Visit Initial Occupational Therapy Evaluation   Living Environment   People in home spouse   Living Environment Comments see PT note 10/31   Self-Care   Usual Activity Tolerance good   Current Activity Tolerance good   Equipment Currently Used at Home grab bar, tub/shower;raised toilet seat  (ledge by toilet)   Activity/Exercise/Self-Care Comment currently sling for R UE   Instrumental Activities of Daily Living (IADL)   IADL Comments indep. ADLs   Disability/Function   Hearing Difficulty or Deaf no   Concentrating, Remembering or Making Decisions Difficulty no   Difficulty Communicating no   Difficulty Eating/Swallowing no   Walking or Climbing Stairs Difficulty no   Dressing/Bathing Difficulty no   Toileting issues no   Fall history within last six months yes   General Information   Onset of Illness/Injury or Date of Surgery 10/28/21   Patient/Family Therapy Goal Statement (OT) home    Existing Precautions/Restrictions fall  (sling)   Right Upper Extremity (Weight-bearing Status)   (in sling, most likely NWB)   Cognitive Status Examination   Affect/Mental Status (Cognitive) WFL   Visual Perception   Visual Impairment/Limitations WFL   Sensory   Sensory Quick Adds No deficits were identified   Pain Assessment   Patient Currently in Pain Yes, see Vital Sign flowsheet   Range of Motion Comprehensive   General Range of Motion no range of motion deficits identified  (for L UE)   Strength Comprehensive (MMT)   General Manual Muscle Testing (MMT) Assessment no strength deficits identified  (for L UE)   Coordination   Upper Extremity Coordination No deficits were identified  (for L UE ,  R  UE NT)   Fine Motor Coordination B hand tremors, stated not new   Transfers   Transfers toilet transfer;bed-chair transfer   Transfer Skill: Bed to Chair/Chair to Bed   Assistive Device (Bed-Chair Transfers)   (none)   Transfer Comments chair trsf indep.    Toilet Transfer   Type (Toilet  Transfer) sit-stand;stand-sit   [place] Level (Toilet Transfer) independent   Assistive Device (Toilet Transfer)   (none)   Balance   Balance Assessment standing balance: static   Standing Balance: Static WFL   Upper Body Dressing Assessment   Comment (Upper Body Dressing) declined UE drsg trial stated sp can A    Lower Body Dressing Assessment   [place] Level (Lower Body Dressing) independent;other (see comments)   Position (Lower Body Dressing) unsupported sitting   Comment (Lower Body Dressing) pants min A, shoes indep.    Clinical Impression   Criteria for Skilled Therapeutic Interventions Met (OT) yes   OT Diagnosis decreased ADLs   OT Problem List-Impairments impacting ADL pain;mobility   Assessment of Occupational Performance 1-3 Performance Deficits   Identified Performance Deficits shin, trsfs   Planned Therapy Interventions (OT) ADL retraining;transfer training   Clinical Decision Making Complexity (OT) moderate complexity   Therapy Frequency (OT) Daily   Predicted Duration of Therapy 1 day   Anticipated Equipment Needs Upon Discharge (OT) shower chair  (rec. shower chair for tub)   Risk & Benefits of therapy have been explained patient;spouse/significant other   OT Discharge Planning    OT Discharge Recommendation (DC Rec) Home with assist   OT Rationale for DC Rec assist w/ drsg, bathing   Total Evaluation Time (Minutes)   Total Evaluation Time (Minutes) 10

## 2021-10-31 NOTE — PLAN OF CARE
Problem: Pain (Orthopaedic Fracture)  Goal: Acceptable Pain Control  Outcome: Improving   Pt. Declined any as needed pain medication.       Problem: Gas Exchange Impaired  Goal: Optimal Gas Exchange  Outcome: Improving   Pts. Oxygen saturations adequate while on room air. Pt. Encouraged to use incentive spirometer. Chest tube to water seal. Chest x-ray ordered for in the morning.     Cristina Horn RN

## 2021-10-31 NOTE — PLAN OF CARE
Physical Therapy Discharge Summary    Reason for therapy discharge:    All goals and outcomes met, no further needs identified.    Progress towards therapy goal(s). See goals on Care Plan in [facility] electronic health record for goal details.  Goals met    Therapy recommendation(s):    Patient to amb with nursing staff while remains in hospital.

## 2021-10-31 NOTE — SIGNIFICANT EVENT
Significant Event Note    Time of event: 7:12 AM October 31, 2021    Description of event:  House officer paged for accidental removal of chest tube. Patient stood at his bedside and the tube fell out. Denies increased work of breathing or change to shortness of breath. On exam, patient has good breath sounds throughout lungs. No increased work of breathing when I saw him. He says he is feeling the same and prior to the chest tube being removed.    Plan:  General surgery was called as they are managing tube -- leave tube out and they placed order for repeat cxr in 4 hours.       Sulema Galindo MD

## 2021-11-01 ENCOUNTER — PATIENT OUTREACH (OUTPATIENT)
Dept: CARE COORDINATION | Facility: CLINIC | Age: 81
End: 2021-11-01

## 2021-11-01 DIAGNOSIS — Z71.89 OTHER SPECIFIED COUNSELING: ICD-10-CM

## 2021-11-01 NOTE — PROGRESS NOTES
Clinic Care Coordination Contact  [facility]  SITUATION                                                      Admission:    Admission Date: 10/28/21   Reason for Admission: Trauma after  accident  Discharge:   Discharge Date: 10/31/21    BACKGROUND                                                      Senthil Castillo is a 81 year old male admitted on 10/28/2021 for PTX s/p chest tube, pain management of multiple rib fractures.    ASSESSMENT      Enrollment  Primary Care Care Coordination Status: Unable to Reach                 Follow up with primary care provider, Mo Lovett, within 7 days for hospital follow- up.          Activity   Order Comments: Your activity upon discharge: activity as tolerated         PLAN                                                        Future Appointments   Date Time Provider Department Center   11/5/2021 10:00 AM Mo Lovett MD [facility]         For any urgent concerns, please contact our 24 hour nurse triage line: [phone] ([phone])         Tonja Sahni MA

## 2021-11-05 ENCOUNTER — OFFICE VISIT (OUTPATIENT)
Dept: FAMILY MEDICINE | Facility: CLINIC | Age: 81
End: 2021-11-05
Payer: COMMERCIAL

## 2021-11-05 VITALS
HEART RATE: 78 BPM | TEMPERATURE: 98.5 F | SYSTOLIC BLOOD PRESSURE: 136 MMHG | RESPIRATION RATE: 18 BRPM | WEIGHT: 225 LBS | DIASTOLIC BLOOD PRESSURE: 89 MMHG | BODY MASS INDEX: 31.38 KG/M2 | OXYGEN SATURATION: 97 %

## 2021-11-05 DIAGNOSIS — S42.021D CLOSED DISPLACED FRACTURE OF SHAFT OF RIGHT CLAVICLE WITH ROUTINE HEALING, SUBSEQUENT ENCOUNTER: Primary | ICD-10-CM

## 2021-11-05 DIAGNOSIS — S22.41XD CLOSED FRACTURE OF MULTIPLE RIBS OF RIGHT SIDE WITH ROUTINE HEALING, SUBSEQUENT ENCOUNTER: ICD-10-CM

## 2021-11-05 DIAGNOSIS — S27.0XXD TRAUMATIC PNEUMOTHORAX, SUBSEQUENT ENCOUNTER: ICD-10-CM

## 2021-11-05 PROCEDURE — 99495 TRANSJ CARE MGMT MOD F2F 14D: CPT | Performed by: FAMILY MEDICINE

## 2021-11-05 RX ORDER — OXYCODONE AND ACETAMINOPHEN 5; 325 MG/1; MG/1
1 TABLET ORAL EVERY 6 HOURS PRN
Qty: 28 TABLET | Refills: 0 | Status: SHIPPED | OUTPATIENT
Start: 2021-11-05 | End: 2021-11-12

## 2021-11-05 ASSESSMENT — PAIN SCALES - GENERAL: PAINLEVEL: MODERATE PAIN (4)

## 2021-11-05 NOTE — PATIENT INSTRUCTIONS
[facility]  Phone: [phone]  Fax: [phone]    [place] [address]    Appointment: Thursday November 11th  Arrival Time:  10:00am  Provider:  Dr. Coyne

## 2021-11-05 NOTE — PROGRESS NOTES
Hospitalization Follow-up Visit         [facility]       Hospital Follow-up Visit:    Hospital:  [facility]   Date of Admission: 10/28/21  Date of Discharge: 10/31/21  Reason(s) for Admission: Traumatic pneumothorax, closed fracture of multiple ribs of right side, fractured right clavicle, contact with powered  as cause of accidental injury            Problems taking medications regularly:  None       Post Discharge Medication Reconciliation: discharge medications reconciled and changed, per note/orders.       Problems adhering to non-medication therapy:  None       Medications reviewed by: by myself. Findings include no change from usual home medications.  Patient not taking narcotics although is in pain.  Summary of hospitalization:  [facility] discharge summary reviewed  Diagnostic Tests/Treatments reviewed.  Follow up needed: I reviewed actual x-ray images and showed these to patient and his wife who had not yet seen his fractures.  Other Healthcare Providers Involved in Patient s Care:         Surgical follow-up appointment - Needs to be scheduled with orthopedics  Update since discharge: stable.   Plan of care communicated with patient and family                   Review of Systems:   RESP: No worsened difficulty in breathing although continues to have to brace himself when he coughs or sneezes  CV: No new chest pains  MUSCULOSKELETAL: Has difficulty sleeping due to pain when he changes position            Physical Exam:     Vitals:    11/05/21 1004   BP: 136/89   BP Location: Left arm   Patient Position: Sitting   Cuff Size: Adult Regular   Pulse: 78   Resp: 18   Temp: 98.5  F (36.9  C)   TempSrc: Oral   SpO2: 97%   Weight: 102.1 kg (225 lb)     Body mass index is 31.38 kg/m .    Has visible deformity at right clavicle.  Review of original clavicle x-ray indicates that the ends are overlapped and shortened and that there is a spicule of bone in between both ends.  To me this appears to  require surgical fixation.    He has a plastic cap still adherent to the right chest wall apparently around the location of the prior chest tube.  I remove this without difficulty and there is no significant entry wound into the chest visible.  I applied a dressing.    Lungs are generally clear to auscultation.  We looked at the chest x-ray and could identify several of the posterior rib fractures.         Results:       Assessment and Plan      Senthil was seen today for hospital f/u.    Diagnoses and all orders for this visit:    Closed displaced fracture of shaft of right clavicle with routine healing, subsequent encounter  -     oxyCODONE-acetaminophen (PERCOCET) 5-325 MG tablet; Take 1 tablet by mouth every 6 hours as needed for pain  -     Orthopedic  Referral; Future    Traumatic pneumothorax, subsequent encounter    Closed fracture of multiple ribs of right side with routine healing, subsequent encounter      I have placed a referral to orthopedics to evaluate whether surgery is needed for the right clavicle fracture.    I strongly encouraged him to take some stronger pain medications especially nightly and he agrees.    He will continue with his usual diabetes and other medications for now.  He can follow-up with me as needed in the future.      E&M code to be billed if TCM cannot be: 03615    Type of decision making: Moderate complexity (62479)      Options for treatment and follow-up care were reviewed with the patient  Senthil Castillo   engaged in the decision making process and verbalized understanding of the options discussed and agreed with the final plan.      oM Lovett MD

## 2021-11-05 NOTE — LETTER
11/5/2021         RE: Senthil Castillo  [address]  [place] [address]      Hospitalization Follow-up Visit         [facility]       Hospital Follow-up Visit:    Hospital:  [facility]   Date of Admission: 10/28/21  Date of Discharge: 10/31/21  Reason(s) for Admission: Traumatic pneumothorax, closed fracture of multiple ribs of right side, fractured right clavicle, contact with powered  as cause of accidental injury            Problems taking medications regularly:  None       Post Discharge Medication Reconciliation: discharge medications reconciled and changed, per note/orders.       Problems adhering to non-medication therapy:  None       Medications reviewed by: by myself. Findings include no change from usual home medications.  Patient not taking narcotics although is in pain.  Summary of hospitalization:  [facility] discharge summary reviewed  Diagnostic Tests/Treatments reviewed.  Follow up needed: I reviewed actual x-ray images and showed these to patient and his wife who had not yet seen his fractures.  Other Healthcare Providers Involved in Patient s Care:         Surgical follow-up appointment - Needs to be scheduled with orthopedics  Update since discharge: stable.   Plan of care communicated with patient and family                   Review of Systems:   RESP: No worsened difficulty in breathing although continues to have to brace himself when he coughs or sneezes  CV: No new chest pains  MUSCULOSKELETAL: Has difficulty sleeping due to pain when he changes position            Physical Exam:     Vitals:    11/05/21 1004   BP: 136/89   BP Location: Left arm   Patient Position: Sitting   Cuff Size: Adult Regular   Pulse: 78   Resp: 18   Temp: 98.5  F (36.9  C)   TempSrc: Oral   SpO2: 97%   Weight: 102.1 kg (225 lb)     Body mass index is 31.38 kg/m .    Has visible deformity at right clavicle.  Review of original clavicle x-ray indicates that the ends are overlapped and shortened  and that there is a spicule of bone in between both ends.  To me this appears to require surgical fixation.    He has a plastic cap still adherent to the right chest wall apparently around the location of the prior chest tube.  I remove this without difficulty and there is no significant entry wound into the chest visible.  I applied a dressing.    Lungs are generally clear to auscultation.  We looked at the chest x-ray and could identify several of the posterior rib fractures.         Results:       Assessment and Plan      Senthil was seen today for hospital f/u.    Diagnoses and all orders for this visit:    Closed displaced fracture of shaft of right clavicle with routine healing, subsequent encounter  -     oxyCODONE-acetaminophen (PERCOCET) 5-325 MG tablet; Take 1 tablet by mouth every 6 hours as needed for pain  -     Orthopedic  Referral; Future    Traumatic pneumothorax, subsequent encounter    Closed fracture of multiple ribs of right side with routine healing, subsequent encounter      I have placed a referral to orthopedics to evaluate whether surgery is needed for the right clavicle fracture.    I strongly encouraged him to take some stronger pain medications especially nightly and he agrees.    He will continue with his usual diabetes and other medications for now.  He can follow-up with me as needed in the future.      E&M code to be billed if TCM cannot be: 24787    Type of decision making: Moderate complexity (66501)      Options for treatment and follow-up care were reviewed with the patient  Senthil Castillo   engaged in the decision making process and verbalized understanding of the options discussed and agreed with the final plan.      MD Mo Hurley MD

## 2021-11-11 ENCOUNTER — TRANSFERRED RECORDS (OUTPATIENT)
Dept: HEALTH INFORMATION MANAGEMENT | Facility: CLINIC | Age: 81
End: 2021-11-11
Payer: COMMERCIAL

## 2021-12-09 DIAGNOSIS — I12.9 RENAL HYPERTENSION: ICD-10-CM

## 2021-12-09 RX ORDER — METOPROLOL SUCCINATE 100 MG/1
TABLET, EXTENDED RELEASE ORAL
Qty: 270 TABLET | Refills: 1 | Status: SHIPPED | OUTPATIENT
Start: 2021-12-09 | End: 2022-02-11

## 2021-12-15 ENCOUNTER — TRANSFERRED RECORDS (OUTPATIENT)
Dept: HEALTH INFORMATION MANAGEMENT | Facility: CLINIC | Age: 81
End: 2021-12-15
Payer: COMMERCIAL

## 2021-12-19 DIAGNOSIS — R80.1 PERSISTENT PROTEINURIA: ICD-10-CM

## 2021-12-19 DIAGNOSIS — Z79.4 TYPE 2 DIABETES MELLITUS WITH STAGE 3A CHRONIC KIDNEY DISEASE, WITH LONG-TERM CURRENT USE OF INSULIN (H): ICD-10-CM

## 2021-12-19 DIAGNOSIS — E11.22 TYPE 2 DIABETES MELLITUS WITH STAGE 3A CHRONIC KIDNEY DISEASE, WITH LONG-TERM CURRENT USE OF INSULIN (H): ICD-10-CM

## 2021-12-19 DIAGNOSIS — I10 ESSENTIAL HYPERTENSION: ICD-10-CM

## 2021-12-19 DIAGNOSIS — N18.31 TYPE 2 DIABETES MELLITUS WITH STAGE 3A CHRONIC KIDNEY DISEASE, WITH LONG-TERM CURRENT USE OF INSULIN (H): ICD-10-CM

## 2021-12-20 RX ORDER — LISINOPRIL 40 MG/1
TABLET ORAL
Qty: 90 TABLET | Refills: 1 | Status: SHIPPED | OUTPATIENT
Start: 2021-12-20 | End: 2022-02-11

## 2021-12-20 RX ORDER — AMLODIPINE BESYLATE 10 MG/1
TABLET ORAL
Qty: 90 TABLET | Refills: 1 | Status: SHIPPED | OUTPATIENT
Start: 2021-12-20 | End: 2022-02-11

## 2021-12-20 RX ORDER — ATORVASTATIN CALCIUM 40 MG/1
TABLET, FILM COATED ORAL
Qty: 90 TABLET | Refills: 1 | Status: SHIPPED | OUTPATIENT
Start: 2021-12-20 | End: 2022-02-11

## 2022-01-02 ENCOUNTER — HEALTH MAINTENANCE LETTER (OUTPATIENT)
Age: 82
End: 2022-01-02

## 2022-01-26 ENCOUNTER — TRANSFERRED RECORDS (OUTPATIENT)
Dept: HEALTH INFORMATION MANAGEMENT | Facility: CLINIC | Age: 82
End: 2022-01-26
Payer: COMMERCIAL

## 2022-02-05 DIAGNOSIS — R80.1 PERSISTENT PROTEINURIA: ICD-10-CM

## 2022-02-05 DIAGNOSIS — Z79.4 TYPE 2 DIABETES MELLITUS WITH STAGE 3A CHRONIC KIDNEY DISEASE, WITH LONG-TERM CURRENT USE OF INSULIN (H): Primary | ICD-10-CM

## 2022-02-05 DIAGNOSIS — E11.22 TYPE 2 DIABETES MELLITUS WITH STAGE 3A CHRONIC KIDNEY DISEASE, WITH LONG-TERM CURRENT USE OF INSULIN (H): Primary | ICD-10-CM

## 2022-02-05 DIAGNOSIS — N18.31 TYPE 2 DIABETES MELLITUS WITH STAGE 3A CHRONIC KIDNEY DISEASE, WITH LONG-TERM CURRENT USE OF INSULIN (H): Primary | ICD-10-CM

## 2022-02-09 ENCOUNTER — LAB (OUTPATIENT)
Dept: LAB | Facility: CLINIC | Age: 82
End: 2022-02-09
Payer: COMMERCIAL

## 2022-02-09 DIAGNOSIS — Z79.4 TYPE 2 DIABETES MELLITUS WITH STAGE 3A CHRONIC KIDNEY DISEASE, WITH LONG-TERM CURRENT USE OF INSULIN (H): ICD-10-CM

## 2022-02-09 DIAGNOSIS — R80.1 PERSISTENT PROTEINURIA: ICD-10-CM

## 2022-02-09 DIAGNOSIS — N18.31 TYPE 2 DIABETES MELLITUS WITH STAGE 3A CHRONIC KIDNEY DISEASE, WITH LONG-TERM CURRENT USE OF INSULIN (H): ICD-10-CM

## 2022-02-09 DIAGNOSIS — E11.22 TYPE 2 DIABETES MELLITUS WITH STAGE 3A CHRONIC KIDNEY DISEASE, WITH LONG-TERM CURRENT USE OF INSULIN (H): ICD-10-CM

## 2022-02-09 LAB
ANION GAP SERPL CALCULATED.3IONS-SCNC: 10 MMOL/L (ref 5–18)
BASOPHILS # BLD AUTO: 0 10E3/UL (ref 0–0.2)
BASOPHILS NFR BLD AUTO: 0 %
BUN SERPL-MCNC: 21 MG/DL (ref 8–28)
CALCIUM SERPL-MCNC: 9.3 MG/DL (ref 8.5–10.5)
CHLORIDE BLD-SCNC: 105 MMOL/L (ref 98–107)
CHOLEST SERPL-MCNC: 162 MG/DL
CO2 SERPL-SCNC: 25 MMOL/L (ref 22–31)
CREAT SERPL-MCNC: 1.65 MG/DL (ref 0.7–1.3)
CREAT UR-MCNC: 94 MG/DL
EOSINOPHIL # BLD AUTO: 0.3 10E3/UL (ref 0–0.7)
EOSINOPHIL NFR BLD AUTO: 5 %
ERYTHROCYTE [DISTWIDTH] IN BLOOD BY AUTOMATED COUNT: 13.3 % (ref 10–15)
FASTING STATUS PATIENT QL REPORTED: ABNORMAL
GFR SERPL CREATININE-BSD FRML MDRD: 42 ML/MIN/1.73M2
GLUCOSE BLD-MCNC: 175 MG/DL (ref 70–125)
HBA1C MFR BLD: 8.5 % (ref 0–5.6)
HCT VFR BLD AUTO: 33.5 % (ref 40–53)
HDLC SERPL-MCNC: 48 MG/DL
HGB BLD-MCNC: 10.9 G/DL (ref 13.3–17.7)
IMM GRANULOCYTES # BLD: 0 10E3/UL
IMM GRANULOCYTES NFR BLD: 0 %
LDLC SERPL CALC-MCNC: 68 MG/DL
LYMPHOCYTES # BLD AUTO: 1.4 10E3/UL (ref 0.8–5.3)
LYMPHOCYTES NFR BLD AUTO: 26 %
MCH RBC QN AUTO: 30.1 PG (ref 26.5–33)
MCHC RBC AUTO-ENTMCNC: 32.5 G/DL (ref 31.5–36.5)
MCV RBC AUTO: 93 FL (ref 78–100)
MICROALBUMIN UR-MCNC: 83.69 MG/DL (ref 0–1.99)
MICROALBUMIN/CREAT UR: 890.3 MG/G CR
MONOCYTES # BLD AUTO: 0.4 10E3/UL (ref 0–1.3)
MONOCYTES NFR BLD AUTO: 8 %
NEUTROPHILS # BLD AUTO: 3.4 10E3/UL (ref 1.6–8.3)
NEUTROPHILS NFR BLD AUTO: 61 %
PLATELET # BLD AUTO: 291 10E3/UL (ref 150–450)
POTASSIUM BLD-SCNC: 5.1 MMOL/L (ref 3.5–5)
RBC # BLD AUTO: 3.62 10E6/UL (ref 4.4–5.9)
SODIUM SERPL-SCNC: 140 MMOL/L (ref 136–145)
TRIGL SERPL-MCNC: 231 MG/DL
WBC # BLD AUTO: 5.5 10E3/UL (ref 4–11)

## 2022-02-09 PROCEDURE — 82043 UR ALBUMIN QUANTITATIVE: CPT

## 2022-02-09 PROCEDURE — 36415 COLL VENOUS BLD VENIPUNCTURE: CPT

## 2022-02-09 PROCEDURE — 80048 BASIC METABOLIC PNL TOTAL CA: CPT

## 2022-02-09 PROCEDURE — 83036 HEMOGLOBIN GLYCOSYLATED A1C: CPT

## 2022-02-09 PROCEDURE — 85025 COMPLETE CBC W/AUTO DIFF WBC: CPT

## 2022-02-09 PROCEDURE — 80061 LIPID PANEL: CPT

## 2022-02-10 ENCOUNTER — OFFICE VISIT (OUTPATIENT)
Dept: FAMILY MEDICINE | Facility: CLINIC | Age: 82
End: 2022-02-10
Payer: COMMERCIAL

## 2022-02-10 VITALS
BODY MASS INDEX: 31.35 KG/M2 | SYSTOLIC BLOOD PRESSURE: 161 MMHG | WEIGHT: 224.8 LBS | RESPIRATION RATE: 18 BRPM | DIASTOLIC BLOOD PRESSURE: 82 MMHG | HEART RATE: 65 BPM | OXYGEN SATURATION: 98 % | TEMPERATURE: 98.3 F

## 2022-02-10 DIAGNOSIS — R80.1 PERSISTENT PROTEINURIA: ICD-10-CM

## 2022-02-10 DIAGNOSIS — N18.31 TYPE 2 DIABETES MELLITUS WITH STAGE 3A CHRONIC KIDNEY DISEASE, WITH LONG-TERM CURRENT USE OF INSULIN (H): ICD-10-CM

## 2022-02-10 DIAGNOSIS — Z79.4 TYPE 2 DIABETES MELLITUS WITH STAGE 3B CHRONIC KIDNEY DISEASE, WITH LONG-TERM CURRENT USE OF INSULIN (H): Primary | ICD-10-CM

## 2022-02-10 DIAGNOSIS — I10 ESSENTIAL HYPERTENSION: ICD-10-CM

## 2022-02-10 DIAGNOSIS — L82.1 SEBORRHEIC KERATOSES: ICD-10-CM

## 2022-02-10 DIAGNOSIS — E11.22 TYPE 2 DIABETES MELLITUS WITH STAGE 3A CHRONIC KIDNEY DISEASE, WITH LONG-TERM CURRENT USE OF INSULIN (H): ICD-10-CM

## 2022-02-10 DIAGNOSIS — Z79.4 TYPE 2 DIABETES MELLITUS WITH STAGE 3A CHRONIC KIDNEY DISEASE, WITH LONG-TERM CURRENT USE OF INSULIN (H): ICD-10-CM

## 2022-02-10 DIAGNOSIS — I12.9 RENAL HYPERTENSION: ICD-10-CM

## 2022-02-10 DIAGNOSIS — N18.32 STAGE 3B CHRONIC KIDNEY DISEASE (H): ICD-10-CM

## 2022-02-10 DIAGNOSIS — N18.32 TYPE 2 DIABETES MELLITUS WITH STAGE 3B CHRONIC KIDNEY DISEASE, WITH LONG-TERM CURRENT USE OF INSULIN (H): Primary | ICD-10-CM

## 2022-02-10 DIAGNOSIS — E11.22 TYPE 2 DIABETES MELLITUS WITH STAGE 3B CHRONIC KIDNEY DISEASE, WITH LONG-TERM CURRENT USE OF INSULIN (H): Primary | ICD-10-CM

## 2022-02-10 DIAGNOSIS — E83.42 HYPOMAGNESEMIA: ICD-10-CM

## 2022-02-10 PROCEDURE — 99215 OFFICE O/P EST HI 40 MIN: CPT | Performed by: FAMILY MEDICINE

## 2022-02-10 NOTE — PROGRESS NOTES
[facility] - Office Visit    ASSESSMENT & PLAN   Senthil was seen today for diabetes and medication refill.    Diagnoses and all orders for this visit:    Type 2 diabetes mellitus with stage 3b chronic kidney disease, with long-term current use of insulin (H)  -     Adult Nephrology  Referral; Future    Stage 3b chronic kidney disease (H)  -     Adult Nephrology  Referral; Future    Essential hypertension    Seborrheic keratoses      Senthil was seen today for diabetes and medication refill.    Diagnoses and all orders for this visit:    Type 2 diabetes mellitus:  Describes increased difficulty with diet and exercise over the holidays, has had intermittent episodes of dizziness and faintness mostly in the mornings. Hemoglobin A1c of 8.5% today (up from 8.0%). His A1c has increased, possibly due to temporary difficulty managing lifestyle, but he is not concerned about worsening symptoms and not interested in adjusting medications at this time.  -     Continue using medications as prescribed even though he is on a very dated regimen and is taking the wrong form of insulin for his lifestyle (not eating for most of the day).  I had previously prescribed empagliflozin but he would not pay the cost of this medication.    Stage 3b chronic kidney disease (H)  History of CKD related to DM, had been referred to nephrology previously but did not see them. Today eGFR was 42, microalbumin 890.3 mg/g Cr. His most recent labs put him in a moderate-to-severe CKD risk category, increased from prior visits, after discussing this with him he is amenable to meeting with nephrology to discuss his options moving forward.  He is ambivalent about duplicating care and I suggested he discuss with the nephrologist the possibility of following with me regularly and checking in with the nephrologist, say, once per year.  -     Adult Nephrology  Referral; Future    Hypertension  Measuring his blood pressure  at home when possible (ie when at drugstore, grocery store), says he has been consistently in the mid-to-high 130s/80s, not surprised his pressures are higher today and believes it is due to being at the doctors. Pressures taken today were 183/82, then 4 minutes later 161/82. Concerning for progression of hypertension, but his home measurements have been consistently in a safer zone, this could be transient. He is not interested in changing his blood pressure medications at this time.          -     Continue to monitor at home, return for follow-up as needed.  Confirmed with the patient that he understands goal is under 140/90 at all times.    Skin lesions  Patient does appear to have several other skin lesions insufficient time to evaluate today.  Offered a referral to Dermatology for comprehensive skin exam - but patient is not concerned and declines.  Reassured that left temple lesions appear SKs and not melanocytic.  Could shave biopsy lesion on hand which may be a wart but is not a nevus.         Patient Instructions       02/10/22   NEPHROLOGY REFERRAL  [facility]  [address] [place] [address], [address]  [place] [address]  Phone: [phone]  Fax: [phone]    Referral, demographics, office visit and medication list faxed to [phone]    Mandy Soto        There are no discontinued medications.    Follow-up: Return in about 3 months (around 5/10/2022), or if symptoms worsen or fail to improve.    -----  Fly Alarcon, MS3 working with Dr.David Lovett    Total visit time with patient was 40 mins, all of which was face to face MD time, and over 50% of this time was spent in counseling and coordination of care.  Including post-encounter documentation and orders, total encounter time was 45 mins.      Preceptor Attestation:   I was present with the medical student who participated in the service and in the documentation of this note. I have verified the history and personally performed the  physical exam and medical decision making. I have verified the content of the note, which accurately reflects my assessment of the patient and the plan of care.   Supervising Physician:  Mo Lovett MD.         SUBJECTIVE       Senthil Castillo is a 81 year old  male who presents to clinic today for the following:  Chief Complaint   Patient presents with     Diabetes     RECHECK; Blood sugars up/down, minor dizziness here and there      Medication Refill     all meds, heading out of town        DM Type II with stage 3b CKD: Feels his diabetes control has been somewhat worse over the past 2-3 months. Some difficulty controlling diet over the holidays, had an upper respiratory infection for a few weeks in December, and is still recovering from traumatic accident in October. Has had spells of dizziness and faintness, particularly early in the morning, has not fallen or passed out. Taking his medications as prescribed without issue. Not interested in changing or adding medications at this point. Hesitant about nephrology referral because he is unsure what they will do for him.     Mole check: His wife is concerned about a number of different dermal changes on his left side. She notes a non-pigmented thickened are on the top of his left hand, three dark plaques on the his left temple, and a small bump at the top of his left ear. None of these are new, but there has been some undefined growth in the past months. Pt himself is not concerned about these changes.    Fractured clavicle and ribs: Fractured collarbone last October, no surgery was recommended at that time. It seems to be healing well. He has returned to daily activities and is able to lift heavy objects and shovel snow without issue.    Review of Systems   Constitutional: Negative for chills and fever.   HENT: Negative for congestion, ear pain, hearing loss and sore throat.    Eyes: Negative for pain and visual disturbance.   Respiratory: Negative for cough and  shortness of breath.    Cardiovascular: Negative for chest pain, palpitations and peripheral edema.   Gastrointestinal: Negative for abdominal pain, constipation, diarrhea, heartburn, hematochezia and nausea.   Genitourinary: Negative for dysuria, frequency, hematuria  Musculoskeletal: Negative for arthralgias, joint swelling and myalgias.   Neurological: Negative for weakness, headaches and paresthesias.   Psychiatric/Behavioral: Negative for mood changes.           OBJECTIVE   BP (!) 161/82 (BP Location: Left arm, Patient Position: Sitting, Cuff Size: Adult Large)   Pulse 65   Temp 98.3  F (36.8  C) (Oral)   Resp 18   Wt 102 kg (224 lb 12.8 oz)   SpO2 98%   BMI 31.35 kg/m       BP is elevated - reports that it is usually under 140/90 whenever he measures it at home - he is sure he has white coat HTN  General:  Normal appearance, no apparent distress, pleasant and conversational  Cardiovascular: Normal rate and regular rhythm, normal heart sounds  Lungs: Pulmonary effort is normal, normal breath sounds  Extremities: no lower extremity edema  Pulses:  2+ radial, dorsalis pedis  Skin: Warm and dry. Small, non-pigmented hyperkeratotic lesion dorsum of L hand overlying 3rd MCP joint - not a nevus. Multiple dark, waxy papules on left side of face.  Dermoscopy consistent with all being seborrheic keratoses.  MSK: Large bulge over R clavicle where it was fractured, no warmth or tenderness on palpation.  Neurologic: No focal deficit, alert and oriented x3  Psychiatric: normal mood and affect, cooperative     Results for orders placed or performed in visit on 02/09/22 (from the past 48 hour(s))   Albumin Random Urine Quantitative with Creat Ratio   Result Value Ref Range    Microalbumin Urine mg/dL 83.69 (H) 0.00 - 1.99 mg/dL    Creatinine Urine mg/dL 94 mg/dL    Microalbumin Urine mg/g Cr 890.3 (H) <=19.9 mg/g Cr    Narrative    Microalbumin, Random Urine   <2.0 mg/dL . . . . . . . . Normal   3.0-30.0 mg/dL . . . . .  . Microalbuminuria   >30.0 mg/dL . . . . . .  . Clinical Proteinuria     Microalbumin/Creatinine Ratio, Random Urine   <20 mg/g . . . . .. . . . Normal    mg/g . . . . . . . Microalbuminuria   >300 mg/g . . . . . . . . Clinical Proteinuria   Hemoglobin A1c   Result Value Ref Range    Hemoglobin A1C 8.5 (H) 0.0 - 5.6 %   Lipid Panel   Result Value Ref Range    Cholesterol 162 <=199 mg/dL    Triglycerides 231 (H) <=149 mg/dL    Direct Measure HDL 48 >=40 mg/dL    LDL Cholesterol Calculated 68 <=129 mg/dL    Patient Fasting > 8hrs? Unknown    Basic metabolic panel   Result Value Ref Range    Sodium 140 136 - 145 mmol/L    Potassium 5.1 (H) 3.5 - 5.0 mmol/L    Chloride 105 98 - 107 mmol/L    Carbon Dioxide (CO2) 25 22 - 31 mmol/L    Anion Gap 10 5 - 18 mmol/L    Urea Nitrogen 21 8 - 28 mg/dL    Creatinine 1.65 (H) 0.70 - 1.30 mg/dL    Calcium 9.3 8.5 - 10.5 mg/dL    Glucose 175 (H) 70 - 125 mg/dL    GFR Estimate 42 (L) >60 mL/min/1.73m2   CBC with Platelets & Differential    Narrative    The following orders were created for panel order CBC with Platelets & Differential.  Procedure                               Abnormality         Status                     ---------                               -----------         ------                     CBC with platelets and d...[[id number]]  Abnormal            Final result                 Please view results for these tests on the individual orders.   CBC with platelets and differential   Result Value Ref Range    WBC Count 5.5 4.0 - 11.0 10e3/uL    RBC Count 3.62 (L) 4.40 - 5.90 10e6/uL    Hemoglobin 10.9 (L) 13.3 - 17.7 g/dL    Hematocrit 33.5 (L) 40.0 - 53.0 %    MCV 93 78 - 100 fL    MCH 30.1 26.5 - 33.0 pg    MCHC 32.5 31.5 - 36.5 g/dL    RDW 13.3 10.0 - 15.0 %    Platelet Count 291 150 - 450 10e3/uL    % Neutrophils 61 %    % Lymphocytes 26 %    % Monocytes 8 %    % Eosinophils 5 %    % Basophils 0 %    % Immature Granulocytes 0 %    Absolute Neutrophils 3.4 1.6 -  8.3 10e3/uL    Absolute Lymphocytes 1.4 0.8 - 5.3 10e3/uL    Absolute Monocytes 0.4 0.0 - 1.3 10e3/uL    Absolute Eosinophils 0.3 0.0 - 0.7 10e3/uL    Absolute Basophils 0.0 0.0 - 0.2 10e3/uL    Absolute Immature Granulocytes 0.0 <=0.4 10e3/uL     Discussed lab results - CKD G3b A3    Reviewed medications and refilled all.

## 2022-02-10 NOTE — PATIENT INSTRUCTIONS
02/10/22   NEPHROLOGY REFERRAL  [facility]  [address] [facility], [address]  [place] [address]  Phone: [phone]  Fax: [phone]    Referral, demographics, office visit and medication list faxed to [phone]    Mandy Soto

## 2022-02-10 NOTE — LETTER
2/10/2022         RE: Senthil PICKARD Anna  [address]  [place] [address]      [facility] - Office Visit    ASSESSMENT & PLAN   Senthil was seen today for diabetes and medication refill.    Diagnoses and all orders for this visit:    Type 2 diabetes mellitus with stage 3b chronic kidney disease, with long-term current use of insulin (H)  -     Adult Nephrology  Referral; Future    Stage 3b chronic kidney disease (H)  -     Adult Nephrology  Referral; Future    Essential hypertension    Seborrheic keratoses      Senthil was seen today for diabetes and medication refill.    Diagnoses and all orders for this visit:    Type 2 diabetes mellitus:  Describes increased difficulty with diet and exercise over the holidays, has had intermittent episodes of dizziness and faintness mostly in the mornings. Hemoglobin A1c of 8.5% today (up from 8.0%). His A1c has increased, possibly due to temporary difficulty managing lifestyle, but he is not concerned about worsening symptoms and not interested in adjusting medications at this time.  -     Continue using medications as prescribed.     Stage 3b chronic kidney disease (H)  History of CKD related to DM, had been referred to nephrology previously but did not see them. Today eGFR was 42, microalbumin 890.3 mg/g Cr. His most recent labs put him in a moderate-to-severe CKD risk category, increased from prior visits, after discussing this with him he is amenable to meeting with nephrology to discuss his options moving forward.  -     Adult Nephrology  Referral; Future    Hypertension  Measuring his blood pressure at home when possible (ie when at drugstore, grocery store), says he has been consistently in the mid-to-high 130s/80s, not surprised his pressures are higher today and believes it is due to being at the doctors. Pressures taken today were 183/82, then 4 minutes later 161/82. Concerning for progression of hypertension, but his home  measurements have been consistently in a safer zone, this could be transient. He is not interested in changing his blood pressure medications at this time.          -     Continue to monitor at home, return for follow-up as needed    Skin lesions  Patient does appear to have several other skin lesions insufficient time to evaluate today.  Offered a referral to Dermatology for comprehensive skin exam - but patient is not concerned and declines.  Reassured that left temple lesions appear SKs and not melanocytic.  Could shave biopsy lesion on hand which may be a wart but is not a nevus.         Patient Instructions       02/10/22   NEPHROLOGY REFERRAL  [facility]  [address], [address]  [place] [address]  Phone: [phone]  Fax: [phone]    Referral, demographics, office visit and medication list faxed to [phone]    Mandy Soto        There are no discontinued medications.    Follow-up: Return in about 3 months (around 5/10/2022), or if symptoms worsen or fail to improve.    -----  Fly Alarcon, MS3 working with Dr.David Lovett    Total visit time with patient was 40 mins, all of which was face to face MD time, and over 50% of this time was spent in counseling and coordination of care.  Including post-encounter documentation and orders, total encounter time was 45 mins.      Preceptor Attestation:   I was present with the medical student who participated in the service and in the documentation of this note. I have verified the history and personally performed the physical exam and medical decision making. I have verified the content of the note, which accurately reflects my assessment of the patient and the plan of care.   Supervising Physician:  Mo Lovett MD.         SUBJECTIVE       Senthil Castillo is a 81 year old  male who presents to clinic today for the following:  Chief Complaint   Patient presents with     Diabetes     RECHECK; Blood sugars up/down, minor dizziness  here and there      Medication Refill     all meds, heading out of town        DM Type II with stage 3b CKD: Feels his diabetes control has been somewhat worse over the past 2-3 months. Some difficulty controlling diet over the holidays, had an upper respiratory infection for a few weeks in December, and is still recovering from traumatic accident in October. Has had spells of dizziness and faintness, particularly early in the morning, has not fallen or passed out. Taking his medications as prescribed without issue. Not interested in changing or adding medications at this point. Hesitant about nephrology referral because he is unsure what they will do for him.     Mole check: His wife is concerned about a number of different dermal changes on his left side. She notes a non-pigmented thickened are on the top of his left hand, three dark plaques on the his left temple, and a small bump at the top of his left ear. None of these are new, but there has been some undefined growth in the past months. Pt himself is not concerned about these changes.    Fractured clavicle and ribs: Fractured collarbone last October, no surgery was recommended at that time. It seems to be healing well. He has returned to daily activities and is able to lift heavy objects and shovel snow without issue.    Review of Systems   Constitutional: Negative for chills and fever.   HENT: Negative for congestion, ear pain, hearing loss and sore throat.    Eyes: Negative for pain and visual disturbance.   Respiratory: Negative for cough and shortness of breath.    Cardiovascular: Negative for chest pain, palpitations and peripheral edema.   Gastrointestinal: Negative for abdominal pain, constipation, diarrhea, heartburn, hematochezia and nausea.   Genitourinary: Negative for dysuria, frequency, hematuria  Musculoskeletal: Negative for arthralgias, joint swelling and myalgias.   Neurological: Negative for weakness, headaches and paresthesias.    Psychiatric/Behavioral: Negative for mood changes.           OBJECTIVE   BP (!) 161/82 (BP Location: Left arm, Patient Position: Sitting, Cuff Size: Adult Large)   Pulse 65   Temp 98.3  F (36.8  C) (Oral)   Resp 18   Wt 102 kg (224 lb 12.8 oz)   SpO2 98%   BMI 31.35 kg/m       BP is elevated - reports that it is usually under 140/90 whenever he measures it at home - he is sure he has white coat HTN  General:  Normal appearance, no apparent distress, pleasant and conversational  Cardiovascular: Normal rate and regular rhythm, normal heart sounds  Lungs: Pulmonary effort is normal, normal breath sounds  Extremities: no lower extremity edema  Pulses:  2+ radial, dorsalis pedis  Skin: Warm and dry. Small, non-pigmented hyperkeratotic lesion dorsum of L hand overlying 3rd MCP joint - not a nevus. Multiple dark, waxy papules on left side of face.  Dermoscopy consistent with all being seborrheic keratoses.  MSK: Large bulge over R clavicle where it was fractured, no warmth or tenderness on palpation.  Neurologic: No focal deficit, alert and oriented x3  Psychiatric: normal mood and affect, cooperative     Results for orders placed or performed in visit on 02/09/22 (from the past 48 hour(s))   Albumin Random Urine Quantitative with Creat Ratio   Result Value Ref Range    Microalbumin Urine mg/dL 83.69 (H) 0.00 - 1.99 mg/dL    Creatinine Urine mg/dL 94 mg/dL    Microalbumin Urine mg/g Cr 890.3 (H) <=19.9 mg/g Cr    Narrative    Microalbumin, Random Urine   <2.0 mg/dL . . . . . . . . Normal   3.0-30.0 mg/dL . . . . . . Microalbuminuria   >30.0 mg/dL . . . . . .  . Clinical Proteinuria     Microalbumin/Creatinine Ratio, Random Urine   <20 mg/g . . . . .. . . . Normal    mg/g . . . . . . . Microalbuminuria   >300 mg/g . . . . . . . . Clinical Proteinuria   Hemoglobin A1c   Result Value Ref Range    Hemoglobin A1C 8.5 (H) 0.0 - 5.6 %   Lipid Panel   Result Value Ref Range    Cholesterol 162 <=199 mg/dL     Triglycerides 231 (H) <=149 mg/dL    Direct Measure HDL 48 >=40 mg/dL    LDL Cholesterol Calculated 68 <=129 mg/dL    Patient Fasting > 8hrs? Unknown    Basic metabolic panel   Result Value Ref Range    Sodium 140 136 - 145 mmol/L    Potassium 5.1 (H) 3.5 - 5.0 mmol/L    Chloride 105 98 - 107 mmol/L    Carbon Dioxide (CO2) 25 22 - 31 mmol/L    Anion Gap 10 5 - 18 mmol/L    Urea Nitrogen 21 8 - 28 mg/dL    Creatinine 1.65 (H) 0.70 - 1.30 mg/dL    Calcium 9.3 8.5 - 10.5 mg/dL    Glucose 175 (H) 70 - 125 mg/dL    GFR Estimate 42 (L) >60 mL/min/1.73m2   CBC with Platelets & Differential    Narrative    The following orders were created for panel order CBC with Platelets & Differential.  Procedure                               Abnormality         Status                     ---------                               -----------         ------                     CBC with platelets and d...[[id number]]  Abnormal            Final result                 Please view results for these tests on the individual orders.   CBC with platelets and differential   Result Value Ref Range    WBC Count 5.5 4.0 - 11.0 10e3/uL    RBC Count 3.62 (L) 4.40 - 5.90 10e6/uL    Hemoglobin 10.9 (L) 13.3 - 17.7 g/dL    Hematocrit 33.5 (L) 40.0 - 53.0 %    MCV 93 78 - 100 fL    MCH 30.1 26.5 - 33.0 pg    MCHC 32.5 31.5 - 36.5 g/dL    RDW 13.3 10.0 - 15.0 %    Platelet Count 291 150 - 450 10e3/uL    % Neutrophils 61 %    % Lymphocytes 26 %    % Monocytes 8 %    % Eosinophils 5 %    % Basophils 0 %    % Immature Granulocytes 0 %    Absolute Neutrophils 3.4 1.6 - 8.3 10e3/uL    Absolute Lymphocytes 1.4 0.8 - 5.3 10e3/uL    Absolute Monocytes 0.4 0.0 - 1.3 10e3/uL    Absolute Eosinophils 0.3 0.0 - 0.7 10e3/uL    Absolute Basophils 0.0 0.0 - 0.2 10e3/uL    Absolute Immature Granulocytes 0.0 <=0.4 10e3/uL     Discussed lab results - CKD G3b A3    Reviewed medications and refilled all.        [facility] - Office Visit    ASSESSMENT & PLAN   Senthil  was seen today for diabetes and medication refill.    Diagnoses and all orders for this visit:    Type 2 diabetes mellitus with stage 3b chronic kidney disease, with long-term current use of insulin (H)  -     Adult Nephrology  Referral; Future    Stage 3b chronic kidney disease (H)  -     Adult Nephrology  Referral; Future    Essential hypertension    Seborrheic keratoses      Senthil was seen today for diabetes and medication refill.    Diagnoses and all orders for this visit:    Type 2 diabetes mellitus:  Describes increased difficulty with diet and exercise over the holidays, has had intermittent episodes of dizziness and faintness mostly in the mornings. Hemoglobin A1c of 8.5% today (up from 8.0%). His A1c has increased, possibly due to temporary difficulty managing lifestyle, but he is not concerned about worsening symptoms and not interested in adjusting medications at this time.  -     Continue using medications as prescribed even though he is on a very dated regimen and is taking the wrong form of insulin for his lifestyle (not eating for most of the day).  I had previously prescribed empagliflozin but he would not pay the cost of this medication.    Stage 3b chronic kidney disease (H)  History of CKD related to DM, had been referred to nephrology previously but did not see them. Today eGFR was 42, microalbumin 890.3 mg/g Cr. His most recent labs put him in a moderate-to-severe CKD risk category, increased from prior visits, after discussing this with him he is amenable to meeting with nephrology to discuss his options moving forward.  He is ambivalent about duplicating care and I suggested he discuss with the nephrologist the possibility of following with me regularly and checking in with the nephrologist, say, once per year.  -     Adult Nephrology  Referral; Future    Hypertension  Measuring his blood pressure at home when possible (ie when at drugstore, grocery store), says he  has been consistently in the mid-to-high 130s/80s, not surprised his pressures are higher today and believes it is due to being at the doctors. Pressures taken today were 183/82, then 4 minutes later 161/82. Concerning for progression of hypertension, but his home measurements have been consistently in a safer zone, this could be transient. He is not interested in changing his blood pressure medications at this time.          -     Continue to monitor at home, return for follow-up as needed.  Confirmed with the patient that he understands goal is under 140/90 at all times.    Skin lesions  Patient does appear to have several other skin lesions insufficient time to evaluate today.  Offered a referral to Dermatology for comprehensive skin exam - but patient is not concerned and declines.  Reassured that left temple lesions appear SKs and not melanocytic.  Could shave biopsy lesion on hand which may be a wart but is not a nevus.         Patient Instructions       02/10/22   NEPHROLOGY REFERRAL  [facility]  [address], [address]  [place] [address]  Phone: [phone]  Fax: [phone]    Referral, demographics, office visit and medication list faxed to [phone]    Mandy Soto        There are no discontinued medications.    Follow-up: Return in about 3 months (around 5/10/2022), or if symptoms worsen or fail to improve.    -----  Fly Alarcon, MS3 working with Dr.David Lovett    Total visit time with patient was 40 mins, all of which was face to face MD time, and over 50% of this time was spent in counseling and coordination of care.  Including post-encounter documentation and orders, total encounter time was 45 mins.      Preceptor Attestation:   I was present with the medical student who participated in the service and in the documentation of this note. I have verified the history and personally performed the physical exam and medical decision making. I have verified the content of  the note, which accurately reflects my assessment of the patient and the plan of care.   Supervising Physician:  Mo Lovett MD.         SUBJECTIVE       Senthil Castillo is a 81 year old  male who presents to clinic today for the following:  Chief Complaint   Patient presents with     Diabetes     RECHECK; Blood sugars up/down, minor dizziness here and there      Medication Refill     all meds, heading out of town        DM Type II with stage 3b CKD: Feels his diabetes control has been somewhat worse over the past 2-3 months. Some difficulty controlling diet over the holidays, had an upper respiratory infection for a few weeks in December, and is still recovering from traumatic accident in October. Has had spells of dizziness and faintness, particularly early in the morning, has not fallen or passed out. Taking his medications as prescribed without issue. Not interested in changing or adding medications at this point. Hesitant about nephrology referral because he is unsure what they will do for him.     Mole check: His wife is concerned about a number of different dermal changes on his left side. She notes a non-pigmented thickened are on the top of his left hand, three dark plaques on the his left temple, and a small bump at the top of his left ear. None of these are new, but there has been some undefined growth in the past months. Pt himself is not concerned about these changes.    Fractured clavicle and ribs: Fractured collarbone last October, no surgery was recommended at that time. It seems to be healing well. He has returned to daily activities and is able to lift heavy objects and shovel snow without issue.    Review of Systems   Constitutional: Negative for chills and fever.   HENT: Negative for congestion, ear pain, hearing loss and sore throat.    Eyes: Negative for pain and visual disturbance.   Respiratory: Negative for cough and shortness of breath.    Cardiovascular: Negative for chest pain,  palpitations and peripheral edema.   Gastrointestinal: Negative for abdominal pain, constipation, diarrhea, heartburn, hematochezia and nausea.   Genitourinary: Negative for dysuria, frequency, hematuria  Musculoskeletal: Negative for arthralgias, joint swelling and myalgias.   Neurological: Negative for weakness, headaches and paresthesias.   Psychiatric/Behavioral: Negative for mood changes.           OBJECTIVE   BP (!) 161/82 (BP Location: Left arm, Patient Position: Sitting, Cuff Size: Adult Large)   Pulse 65   Temp 98.3  F (36.8  C) (Oral)   Resp 18   Wt 102 kg (224 lb 12.8 oz)   SpO2 98%   BMI 31.35 kg/m       BP is elevated - reports that it is usually under 140/90 whenever he measures it at home - he is sure he has white coat HTN  General:  Normal appearance, no apparent distress, pleasant and conversational  Cardiovascular: Normal rate and regular rhythm, normal heart sounds  Lungs: Pulmonary effort is normal, normal breath sounds  Extremities: no lower extremity edema  Pulses:  2+ radial, dorsalis pedis  Skin: Warm and dry. Small, non-pigmented hyperkeratotic lesion dorsum of L hand overlying 3rd MCP joint - not a nevus. Multiple dark, waxy papules on left side of face.  Dermoscopy consistent with all being seborrheic keratoses.  MSK: Large bulge over R clavicle where it was fractured, no warmth or tenderness on palpation.  Neurologic: No focal deficit, alert and oriented x3  Psychiatric: normal mood and affect, cooperative     Results for orders placed or performed in visit on 02/09/22 (from the past 48 hour(s))   Albumin Random Urine Quantitative with Creat Ratio   Result Value Ref Range    Microalbumin Urine mg/dL 83.69 (H) 0.00 - 1.99 mg/dL    Creatinine Urine mg/dL 94 mg/dL    Microalbumin Urine mg/g Cr 890.3 (H) <=19.9 mg/g Cr    Narrative    Microalbumin, Random Urine   <2.0 mg/dL . . . . . . . . Normal   3.0-30.0 mg/dL . . . . . . Microalbuminuria   >30.0 mg/dL . . . . . .  . Clinical  Proteinuria     Microalbumin/Creatinine Ratio, Random Urine   <20 mg/g . . . . .. . . . Normal    mg/g . . . . . . . Microalbuminuria   >300 mg/g . . . . . . . . Clinical Proteinuria   Hemoglobin A1c   Result Value Ref Range    Hemoglobin A1C 8.5 (H) 0.0 - 5.6 %   Lipid Panel   Result Value Ref Range    Cholesterol 162 <=199 mg/dL    Triglycerides 231 (H) <=149 mg/dL    Direct Measure HDL 48 >=40 mg/dL    LDL Cholesterol Calculated 68 <=129 mg/dL    Patient Fasting > 8hrs? Unknown    Basic metabolic panel   Result Value Ref Range    Sodium 140 136 - 145 mmol/L    Potassium 5.1 (H) 3.5 - 5.0 mmol/L    Chloride 105 98 - 107 mmol/L    Carbon Dioxide (CO2) 25 22 - 31 mmol/L    Anion Gap 10 5 - 18 mmol/L    Urea Nitrogen 21 8 - 28 mg/dL    Creatinine 1.65 (H) 0.70 - 1.30 mg/dL    Calcium 9.3 8.5 - 10.5 mg/dL    Glucose 175 (H) 70 - 125 mg/dL    GFR Estimate 42 (L) >60 mL/min/1.73m2   CBC with Platelets & Differential    Narrative    The following orders were created for panel order CBC with Platelets & Differential.  Procedure                               Abnormality         Status                     ---------                               -----------         ------                     CBC with platelets and d...[[id number]]  Abnormal            Final result                 Please view results for these tests on the individual orders.   CBC with platelets and differential   Result Value Ref Range    WBC Count 5.5 4.0 - 11.0 10e3/uL    RBC Count 3.62 (L) 4.40 - 5.90 10e6/uL    Hemoglobin 10.9 (L) 13.3 - 17.7 g/dL    Hematocrit 33.5 (L) 40.0 - 53.0 %    MCV 93 78 - 100 fL    MCH 30.1 26.5 - 33.0 pg    MCHC 32.5 31.5 - 36.5 g/dL    RDW 13.3 10.0 - 15.0 %    Platelet Count 291 150 - 450 10e3/uL    % Neutrophils 61 %    % Lymphocytes 26 %    % Monocytes 8 %    % Eosinophils 5 %    % Basophils 0 %    % Immature Granulocytes 0 %    Absolute Neutrophils 3.4 1.6 - 8.3 10e3/uL    Absolute Lymphocytes 1.4 0.8 - 5.3 10e3/uL     Absolute Monocytes 0.4 0.0 - 1.3 10e3/uL    Absolute Eosinophils 0.3 0.0 - 0.7 10e3/uL    Absolute Basophils 0.0 0.0 - 0.2 10e3/uL    Absolute Immature Granulocytes 0.0 <=0.4 10e3/uL     Discussed lab results - CKD G3b A3    Reviewed medications and refilled all.            Mo Lovett MD

## 2022-02-11 RX ORDER — PEN NEEDLE, DIABETIC 29 G X1/2"
NEEDLE, DISPOSABLE MISCELLANEOUS
Qty: 180 EACH | Refills: 1 | Status: SHIPPED | OUTPATIENT
Start: 2022-02-11 | End: 2022-07-01

## 2022-02-11 RX ORDER — METOPROLOL SUCCINATE 100 MG/1
TABLET, EXTENDED RELEASE ORAL
Qty: 270 TABLET | Refills: 1 | Status: SHIPPED | OUTPATIENT
Start: 2022-02-11 | End: 2022-07-01

## 2022-02-11 RX ORDER — ATORVASTATIN CALCIUM 40 MG/1
40 TABLET, FILM COATED ORAL DAILY
Qty: 90 TABLET | Refills: 1 | Status: SHIPPED | OUTPATIENT
Start: 2022-02-11 | End: 2022-07-01

## 2022-02-11 RX ORDER — AMLODIPINE BESYLATE 10 MG/1
10 TABLET ORAL DAILY
Qty: 90 TABLET | Refills: 1 | Status: SHIPPED | OUTPATIENT
Start: 2022-02-11 | End: 2022-07-01

## 2022-02-11 RX ORDER — MULTIVITAMIN WITH IRON
1 TABLET ORAL AT BEDTIME
Qty: 90 TABLET | Refills: 1 | Status: SHIPPED | OUTPATIENT
Start: 2022-02-11 | End: 2022-07-01

## 2022-02-11 RX ORDER — ASPIRIN 325 MG
325 TABLET ORAL EVERY OTHER DAY
Qty: 90 TABLET | Refills: 1 | Status: SHIPPED | OUTPATIENT
Start: 2022-02-11 | End: 2022-07-01

## 2022-02-11 RX ORDER — LISINOPRIL 40 MG/1
40 TABLET ORAL DAILY
Qty: 90 TABLET | Refills: 1 | Status: SHIPPED | OUTPATIENT
Start: 2022-02-11 | End: 2022-07-01

## 2022-04-24 ENCOUNTER — HEALTH MAINTENANCE LETTER (OUTPATIENT)
Age: 82
End: 2022-04-24

## 2022-06-09 ENCOUNTER — TRANSFERRED RECORDS (OUTPATIENT)
Dept: HEALTH INFORMATION MANAGEMENT | Facility: CLINIC | Age: 82
End: 2022-06-09
Payer: COMMERCIAL

## 2022-06-19 ENCOUNTER — HEALTH MAINTENANCE LETTER (OUTPATIENT)
Age: 82
End: 2022-06-19

## 2022-07-01 ENCOUNTER — OFFICE VISIT (OUTPATIENT)
Dept: FAMILY MEDICINE | Facility: CLINIC | Age: 82
End: 2022-07-01
Payer: COMMERCIAL

## 2022-07-01 VITALS
BODY MASS INDEX: 33.36 KG/M2 | TEMPERATURE: 98.5 F | SYSTOLIC BLOOD PRESSURE: 145 MMHG | DIASTOLIC BLOOD PRESSURE: 76 MMHG | OXYGEN SATURATION: 99 % | HEART RATE: 66 BPM | WEIGHT: 225.2 LBS | HEIGHT: 69 IN | RESPIRATION RATE: 18 BRPM

## 2022-07-01 DIAGNOSIS — E83.42 HYPOMAGNESEMIA: ICD-10-CM

## 2022-07-01 DIAGNOSIS — Z11.4 SCREENING FOR HIV WITHOUT PRESENCE OF RISK FACTORS: ICD-10-CM

## 2022-07-01 DIAGNOSIS — Z79.4 TYPE 2 DIABETES MELLITUS WITH STAGE 3A CHRONIC KIDNEY DISEASE, WITH LONG-TERM CURRENT USE OF INSULIN (H): ICD-10-CM

## 2022-07-01 DIAGNOSIS — E11.22 TYPE 2 DIABETES MELLITUS WITH STAGE 3A CHRONIC KIDNEY DISEASE, WITH LONG-TERM CURRENT USE OF INSULIN (H): ICD-10-CM

## 2022-07-01 DIAGNOSIS — Z79.4 TYPE 2 DIABETES MELLITUS WITH STAGE 3B CHRONIC KIDNEY DISEASE, WITH LONG-TERM CURRENT USE OF INSULIN (H): Primary | ICD-10-CM

## 2022-07-01 DIAGNOSIS — H61.21 IMPACTED CERUMEN OF RIGHT EAR: ICD-10-CM

## 2022-07-01 DIAGNOSIS — I12.9 RENAL HYPERTENSION: ICD-10-CM

## 2022-07-01 DIAGNOSIS — N18.32 TYPE 2 DIABETES MELLITUS WITH STAGE 3B CHRONIC KIDNEY DISEASE, WITH LONG-TERM CURRENT USE OF INSULIN (H): Primary | ICD-10-CM

## 2022-07-01 DIAGNOSIS — E11.22 TYPE 2 DIABETES MELLITUS WITH STAGE 3B CHRONIC KIDNEY DISEASE, WITH LONG-TERM CURRENT USE OF INSULIN (H): ICD-10-CM

## 2022-07-01 DIAGNOSIS — E11.22 TYPE 2 DIABETES MELLITUS WITH STAGE 3B CHRONIC KIDNEY DISEASE, WITH LONG-TERM CURRENT USE OF INSULIN (H): Primary | ICD-10-CM

## 2022-07-01 DIAGNOSIS — N18.32 TYPE 2 DIABETES MELLITUS WITH STAGE 3B CHRONIC KIDNEY DISEASE, WITH LONG-TERM CURRENT USE OF INSULIN (H): ICD-10-CM

## 2022-07-01 DIAGNOSIS — I10 ESSENTIAL HYPERTENSION: ICD-10-CM

## 2022-07-01 DIAGNOSIS — Z11.59 ENCOUNTER FOR HEPATITIS C SCREENING TEST FOR LOW RISK PATIENT: ICD-10-CM

## 2022-07-01 DIAGNOSIS — Z00.00 MEDICARE ANNUAL WELLNESS VISIT, SUBSEQUENT: Primary | ICD-10-CM

## 2022-07-01 DIAGNOSIS — R80.1 PERSISTENT PROTEINURIA: ICD-10-CM

## 2022-07-01 DIAGNOSIS — N18.31 TYPE 2 DIABETES MELLITUS WITH STAGE 3A CHRONIC KIDNEY DISEASE, WITH LONG-TERM CURRENT USE OF INSULIN (H): ICD-10-CM

## 2022-07-01 DIAGNOSIS — Z79.4 TYPE 2 DIABETES MELLITUS WITH STAGE 3B CHRONIC KIDNEY DISEASE, WITH LONG-TERM CURRENT USE OF INSULIN (H): ICD-10-CM

## 2022-07-01 LAB
BASOPHILS # BLD AUTO: 0 10E3/UL (ref 0–0.2)
BASOPHILS NFR BLD AUTO: 0 %
EOSINOPHIL # BLD AUTO: 0.2 10E3/UL (ref 0–0.7)
EOSINOPHIL NFR BLD AUTO: 2 %
ERYTHROCYTE [DISTWIDTH] IN BLOOD BY AUTOMATED COUNT: 13.3 % (ref 10–15)
HBA1C MFR BLD: 8.1 % (ref 0–5.6)
HCT VFR BLD AUTO: 32 % (ref 40–53)
HGB BLD-MCNC: 10.6 G/DL (ref 13.3–17.7)
IMM GRANULOCYTES # BLD: 0 10E3/UL
IMM GRANULOCYTES NFR BLD: 0 %
LYMPHOCYTES # BLD AUTO: 1.7 10E3/UL (ref 0.8–5.3)
LYMPHOCYTES NFR BLD AUTO: 25 %
MAGNESIUM SERPL-MCNC: 2.4 MG/DL (ref 1.7–2.3)
MCH RBC QN AUTO: 30.3 PG (ref 26.5–33)
MCHC RBC AUTO-ENTMCNC: 33.1 G/DL (ref 31.5–36.5)
MCV RBC AUTO: 91 FL (ref 78–100)
MONOCYTES # BLD AUTO: 0.4 10E3/UL (ref 0–1.3)
MONOCYTES NFR BLD AUTO: 6 %
NEUTROPHILS # BLD AUTO: 4.6 10E3/UL (ref 1.6–8.3)
NEUTROPHILS NFR BLD AUTO: 66 %
PLATELET # BLD AUTO: 262 10E3/UL (ref 150–450)
RBC # BLD AUTO: 3.5 10E6/UL (ref 4.4–5.9)
WBC # BLD AUTO: 6.9 10E3/UL (ref 4–11)

## 2022-07-01 PROCEDURE — 83735 ASSAY OF MAGNESIUM: CPT | Performed by: FAMILY MEDICINE

## 2022-07-01 PROCEDURE — 86803 HEPATITIS C AB TEST: CPT | Performed by: FAMILY MEDICINE

## 2022-07-01 PROCEDURE — 85025 COMPLETE CBC W/AUTO DIFF WBC: CPT | Performed by: FAMILY MEDICINE

## 2022-07-01 PROCEDURE — 87389 HIV-1 AG W/HIV-1&-2 AB AG IA: CPT | Performed by: FAMILY MEDICINE

## 2022-07-01 PROCEDURE — G0439 PPPS, SUBSEQ VISIT: HCPCS | Performed by: FAMILY MEDICINE

## 2022-07-01 PROCEDURE — 83036 HEMOGLOBIN GLYCOSYLATED A1C: CPT | Performed by: FAMILY MEDICINE

## 2022-07-01 PROCEDURE — 36415 COLL VENOUS BLD VENIPUNCTURE: CPT | Performed by: FAMILY MEDICINE

## 2022-07-01 PROCEDURE — 80048 BASIC METABOLIC PNL TOTAL CA: CPT | Performed by: FAMILY MEDICINE

## 2022-07-01 RX ORDER — MULTIVITAMIN WITH IRON
1 TABLET ORAL AT BEDTIME
Qty: 90 TABLET | Refills: 1 | Status: SHIPPED | OUTPATIENT
Start: 2022-07-01 | End: 2023-02-13

## 2022-07-01 RX ORDER — METOPROLOL SUCCINATE 100 MG/1
TABLET, EXTENDED RELEASE ORAL
Qty: 270 TABLET | Refills: 1 | Status: SHIPPED | OUTPATIENT
Start: 2022-07-01 | End: 2023-02-13

## 2022-07-01 RX ORDER — PEN NEEDLE, DIABETIC 29 G X1/2"
NEEDLE, DISPOSABLE MISCELLANEOUS
Qty: 180 EACH | Refills: 1 | Status: SHIPPED | OUTPATIENT
Start: 2022-07-01 | End: 2023-02-13

## 2022-07-01 RX ORDER — LISINOPRIL 40 MG/1
40 TABLET ORAL DAILY
Qty: 90 TABLET | Refills: 1 | Status: SHIPPED | OUTPATIENT
Start: 2022-07-01 | End: 2023-01-30

## 2022-07-01 RX ORDER — ASPIRIN 325 MG
325 TABLET ORAL EVERY OTHER DAY
Qty: 90 TABLET | Refills: 1 | Status: SHIPPED | OUTPATIENT
Start: 2022-07-01 | End: 2023-02-13

## 2022-07-01 RX ORDER — ATORVASTATIN CALCIUM 40 MG/1
40 TABLET, FILM COATED ORAL DAILY
Qty: 90 TABLET | Refills: 1 | Status: SHIPPED | OUTPATIENT
Start: 2022-07-01 | End: 2023-01-30

## 2022-07-01 RX ORDER — AMLODIPINE BESYLATE 10 MG/1
10 TABLET ORAL DAILY
Qty: 90 TABLET | Refills: 1 | Status: SHIPPED | OUTPATIENT
Start: 2022-07-01 | End: 2023-01-30

## 2022-07-01 ASSESSMENT — ENCOUNTER SYMPTOMS
PARESTHESIAS: 0
MYALGIAS: 0
DYSURIA: 0
CHILLS: 0
COUGH: 0
JOINT SWELLING: 0
FREQUENCY: 0
HEMATURIA: 0
SORE THROAT: 0
EYE PAIN: 0
ABDOMINAL PAIN: 0
NAUSEA: 0
HEMATOCHEZIA: 0
CONSTIPATION: 0
HEADACHES: 0
WEAKNESS: 0
NERVOUS/ANXIOUS: 0
FEVER: 0
DIARRHEA: 0
PALPITATIONS: 0
HEARTBURN: 0
ARTHRALGIAS: 1
DIZZINESS: 1
SHORTNESS OF BREATH: 0

## 2022-07-01 ASSESSMENT — ACTIVITIES OF DAILY LIVING (ADL): CURRENT_FUNCTION: NO ASSISTANCE NEEDED

## 2022-07-01 NOTE — PROGRESS NOTES
"SUBJECTIVE:   Senthil Castillo is a 81 year old male who presents for Preventive Visit.    Patient has been advised of split billing requirements and indicates understanding: Yes  Are you in the first 12 months of your Medicare coverage?  No    Healthy Habits:     In general, how would you rate your overall health?  Good    Frequency of exercise:  1 day/week    Duration of exercise:  Other    Do you usually eat at least 4 servings of fruit and vegetables a day, include whole grains    & fiber and avoid regularly eating high fat or \"junk\" foods?  Yes    Taking medications regularly:  Yes    Medication side effects:  None    Ability to successfully perform activities of daily living:  No assistance needed    Home Safety:  No safety concerns identified    Hearing Impairment:  No hearing concerns    In the past 6 months, have you been bothered by leaking of urine?  No    In general, how would you rate your overall mental or emotional health?  Good      PHQ-2 Total Score: 0    Additional concerns today:  Yes    Do you feel safe in your environment? Yes    Have you ever done Advance Care Planning? (For example, a Health Directive, POLST, or a discussion with a medical provider or your loved ones about your wishes): No, advance care planning information given to patient to review.  Patient plans to discuss their wishes with loved ones or provider.      Fall risk  Fallen 2 or more times in the past year?: No  Any fall with injury in the past year?: No    Cognitive Screening   1) Repeat 3 items (Leader, Season, Table)    2) Clock draw: NORMAL  3) 3 item recall: Recalls 3 objects  Results: 3 items recalled: COGNITIVE IMPAIRMENT LESS LIKELY      Do you have sleep apnea, excessive snoring or daytime drowsiness?: no    Reviewed and updated as needed this visit by clinical staff   Tobacco  Allergies  Meds   Med Hx  Surg Hx  Fam Hx  Soc Hx          Reviewed and updated as needed this visit by Provider                   Social " History     Tobacco Use     Smoking status: Never Smoker     Smokeless tobacco: Never Used   Substance Use Topics     Alcohol use: Yes     Comment: beer once in a while       Alcohol Use 7/1/2022   Prescreen: >3 drinks/day or >7 drinks/week? No     Diabetes Follow-up    How often are you checking your blood sugar? One time daily  What time of day are you checking your blood sugars (select all that apply)?  Before meals  Have you had any blood sugars above 200?  Yes   Have you had any blood sugars below 70?  No    What symptoms do you notice when your blood sugar is low?  Shaky, Dizzy and Weak    What concerns do you have today about your diabetes? None     Do you have any of these symptoms? (Select all that apply)  Numbness in feet    Hyperlipidemia Follow-Up      Are you regularly taking any medication or supplement to lower your cholesterol?   Yes- daily    Are you having muscle aches or other side effects that you think could be caused by your cholesterol lowering medication?  No    Hypertension Follow-up      Do you check your blood pressure regularly outside of the clinic? Yes     Are you following a low salt diet? Yes    Are your blood pressures ever more than 140 on the top number (systolic) OR more   than 90 on the bottom number (diastolic), for example 140/90? No    BP Readings from Last 2 Encounters:   07/01/22 (!) 145/76   02/10/22 (!) 161/82     Hemoglobin A1C POCT (%)   Date Value   06/03/2021 8.5 (H)   01/13/2021 8.7 (H)     Hemoglobin A1C (%)   Date Value   02/09/2022 8.5 (H)   09/29/2021 8.0 (H)     LDL Cholesterol Calculated (mg/dL)   Date Value   02/09/2022 68   09/29/2021 59   01/13/2021 52   11/07/2019 88         Current providers sharing in care for this patient include:   Patient Care Team:  Mo Lovett MD as PCP - General (Family Practice)  Mo Lovett MD as Assigned PCP  Reagan Jackson MD as Assigned Neuroscience Provider  Hay Conner RPH as Pharmacist (Pharmacist)    The  "following health maintenance items are reviewed in Epic and correct as of today:  Health Maintenance Due   Topic Date Due     HEPATITIS C SCREENING  Never done     ZOSTER IMMUNIZATION (1 of 2) Never done     COVID-19 Vaccine (4 - Booster for Pfizer series) 02/01/2022     A1C  05/09/2022       Review of Systems   Constitutional: Negative for chills and fever.   HENT: Positive for hearing loss. Negative for ear pain and sore throat.    Eyes: Negative for pain and visual disturbance.   Respiratory: Negative for cough and shortness of breath.    Cardiovascular: Negative for chest pain, palpitations and peripheral edema.   Gastrointestinal: Negative for abdominal pain, constipation, diarrhea, heartburn, hematochezia and nausea.   Genitourinary: Positive for impotence. Negative for dysuria, frequency, genital sores, hematuria, penile discharge and urgency.   Musculoskeletal: Positive for arthralgias. Negative for joint swelling and myalgias.   Skin: Negative for rash.   Neurological: Positive for dizziness. Negative for weakness, headaches and paresthesias.   Psychiatric/Behavioral: Negative for mood changes. The patient is not nervous/anxious.        OBJECTIVE:   BP (!) 145/76 (BP Location: Left arm, Patient Position: Sitting, Cuff Size: Adult Large)   Pulse 66   Temp 98.5  F (36.9  C) (Oral)   Resp 18   Ht 1.758 m (5' 9.21\")   Wt 102.2 kg (225 lb 3.2 oz)   SpO2 99%   BMI 33.05 kg/m   Estimated body mass index is 33.05 kg/m  as calculated from the following:    Height as of this encounter: 1.758 m (5' 9.21\").    Weight as of this encounter: 102.2 kg (225 lb 3.2 oz).  Physical Exam  GENERAL: healthy, alert and no distress  EYES: Eyes grossly normal to inspection, PERRL and conjunctivae and sclerae normal  HENT: normal cephalic/atraumatic, nose and mouth without ulcers or lesions, oropharynx clear, oral mucous membranes moist and cerumen impaction right ear  NECK: no adenopathy, no asymmetry, masses, or scars and " thyroid normal to palpation  RESP: lungs clear to auscultation - no rales, rhonchi or wheezes  CV: regular rate and rhythm, normal S1 S2, no S3 or S4, no murmur, click or rub, no peripheral edema and peripheral pulses strong  ABDOMEN: soft, nontender, no hepatosplenomegaly, no masses and bowel sounds normal  MS: no gross musculoskeletal defects noted, no edema  NEURO: Normal strength and tone, mentation intact and speech normal  PSYCH: mentation appears normal, affect normal/bright  Diabetic foot exam: normal DP and PT pulses, no trophic changes or ulcerative lesions, normal sensory exam and normal monofilament exam    Diagnostic Test Results:  Labs reviewed in Epic    ASSESSMENT / PLAN:   Senthil was seen today for wellness visit.    Diagnoses and all orders for this visit:    Medicare annual wellness visit, subsequent    Type 2 diabetes mellitus with stage 3b chronic kidney disease, with long-term current use of insulin (H)  -     Basic metabolic panel  -     Cancel: Hemoglobin A1c  -     Cancel: CBC with platelets    Encounter for hepatitis C screening test for low risk patient  -     Hepatitis C antibody    Screening for HIV without presence of risk factors  -     HIV Antigen Antibody Combo    Essential hypertension  -     amLODIPine (NORVASC) 10 MG tablet; Take 1 tablet (10 mg) by mouth daily  -     lisinopril (ZESTRIL) 40 MG tablet; Take 1 tablet (40 mg) by mouth daily    Type 2 diabetes mellitus with stage 3a chronic kidney disease, with long-term current use of insulin (H)  -     aspirin (ASA) 325 MG tablet; Take 1 tablet (325 mg) by mouth every other day  -     atorvastatin (LIPITOR) 40 MG tablet; Take 1 tablet (40 mg) by mouth daily  -     blood glucose (ONETOUCH ULTRA) test strip; Use up to 3 times daily  -     insulin pen needle (BD ULTRA-FINE) 29G X 12.7MM miscellaneous; Use twice daily  -     lisinopril (ZESTRIL) 40 MG tablet; Take 1 tablet (40 mg) by mouth daily  -     metFORMIN (GLUCOPHAGE) 500 MG  "tablet; TAKE 1 TABLET BY MOUTH TWICE A DAY WITH MEALS    Persistent proteinuria  -     lisinopril (ZESTRIL) 40 MG tablet; Take 1 tablet (40 mg) by mouth daily    Hypomagnesemia  -     magnesium 250 MG tablet; Take 1 tablet (250 mg) by mouth At Bedtime  -     Magnesium    Renal hypertension  -     metoprolol succinate ER (TOPROL XL) 100 MG 24 hr tablet; TAKE 2 TABLETS BY MOUTH IN THE MORNING AND 1 TABLET AT NIGHT.    Impacted cerumen of right ear  -     REMOVE IMPACTED CERUMEN        Patient has been advised of split billing requirements and indicates understanding: Yes    COUNSELING:  Reviewed preventive health counseling, as reflected in patient instructions       Regular exercise       Healthy diet/nutrition       Vision screening       Dental care       Hepatitis C screening    Estimated body mass index is 33.05 kg/m  as calculated from the following:    Height as of this encounter: 1.758 m (5' 9.21\").    Weight as of this encounter: 102.2 kg (225 lb 3.2 oz).    Weight management plan: Discussed healthy diet and exercise guidelines    He reports that he has never smoked. He has never used smokeless tobacco.      Appropriate preventive services were discussed with this patient, including applicable screening as appropriate for cardiovascular disease, diabetes, osteopenia/osteoporosis, and glaucoma.  As appropriate for age/gender, discussed screening for colorectal cancer, prostate cancer, breast cancer, and cervical cancer. Checklist reviewing preventive services available has been given to the patient.    Reviewed patients plan of care and provided an AVS. The Basic Care Plan (routine screening as documented in Health Maintenance) for Senthil meets the Care Plan requirement. This Care Plan has been established and reviewed with the Patient.    Mo Lovett MD  [facility]    "

## 2022-07-01 NOTE — LETTER
"July 5, 2022      Senthil Castillo  [address]  [place] [address]        Dear ,    We are writing to inform you of your test results.    Jose Guadalupe Shin and Yaneth - great to have seen you Kip.  Sorry for any \"snafu\" with the labs - I gather you may have had to come back in for the A1c - don't know what happened there - again, sorry about that (I had ordered them for before the visit - but that had not happened).     As you can see your A1c is a little better at 8.1 - nice work!  Your magnesium is actually a little high.  Maybe cutting the dose in half would be sufficient - OK?       Your kidney function has gone slightly down since last test - you are in CKD3b and the recommendation is to see a nephrologist at that level.  OK?  I am going to ask our clinic scheduler to specifically get you in with Sg Chairez at this address:   02/10/22   NEPHROLOGY REFERRAL   [facility]   [address] [place] [address], [address]   [place] [address]   Phone: [phone]   Fax: [phone]       One of the problems previously is that the referral may have gone to central [place] scheduling who try to get you in with a [place] provider.     OK?  Take care!  See you in about 3 months.  Mo Lovett       Resulted Orders   Hepatitis C antibody   Result Value Ref Range    Hepatitis C Antibody Nonreactive Nonreactive    Narrative    Assay performance characteristics have not been established for newborns, infants, and children.   HIV Antigen Antibody Combo   Result Value Ref Range    HIV Antigen Antibody Combo Nonreactive Nonreactive      Comment:      HIV-1 p24 Ag & HIV-1/HIV-2 Ab Not Detected   Magnesium   Result Value Ref Range    Magnesium 2.4 (H) 1.7 - 2.3 mg/dL   Hemoglobin A1c   Result Value Ref Range    Hemoglobin A1C 8.1 (H) 0.0 - 5.6 %      Comment:      Normal <5.7%   Prediabetes 5.7-6.4%    Diabetes 6.5% or higher     Note: Adopted from ADA consensus guidelines.   CBC with platelets and differential "   Result Value Ref Range    WBC Count 6.9 4.0 - 11.0 10e3/uL    RBC Count 3.50 (L) 4.40 - 5.90 10e6/uL    Hemoglobin 10.6 (L) 13.3 - 17.7 g/dL    Hematocrit 32.0 (L) 40.0 - 53.0 %    MCV 91 78 - 100 fL    MCH 30.3 26.5 - 33.0 pg    MCHC 33.1 31.5 - 36.5 g/dL    RDW 13.3 10.0 - 15.0 %    Platelet Count 262 150 - 450 10e3/uL    % Neutrophils 66 %    % Lymphocytes 25 %    % Monocytes 6 %    % Eosinophils 2 %    % Basophils 0 %    % Immature Granulocytes 0 %    Absolute Neutrophils 4.6 1.6 - 8.3 10e3/uL    Absolute Lymphocytes 1.7 0.8 - 5.3 10e3/uL    Absolute Monocytes 0.4 0.0 - 1.3 10e3/uL    Absolute Eosinophils 0.2 0.0 - 0.7 10e3/uL    Absolute Basophils 0.0 0.0 - 0.2 10e3/uL    Absolute Immature Granulocytes 0.0 <=0.4 10e3/uL   Basic metabolic panel   Result Value Ref Range    Creatinine 1.84 (H) 0.67 - 1.17 mg/dL    Sodium 146 (H) 136 - 145 mmol/L    Potassium 5.3 3.4 - 5.3 mmol/L    Urea Nitrogen 39.1 (H) 8.0 - 23.0 mg/dL    Chloride 107 98 - 107 mmol/L    Carbon Dioxide (CO2) 15 (L) 22 - 29 mmol/L    Anion Gap 24 (H) 7 - 15 mmol/L    Glucose 62 (L) 70 - 99 mg/dL    GFR Estimate 37 (L) >60 mL/min/1.73m2      Comment:      Effective December 21, 2021 eGFRcr in adults is calculated using the 2021 CKD-EPI creatinine equation which includes age and gender (Ruth et al., NEJM, DOI: 10.1056/XGLEou4593856)    Calcium 9.7 8.8 - 10.2 mg/dL       If you have any questions or concerns, please call the clinic at the number listed above.       Sincerely,      Mo Lovett MD

## 2022-07-02 LAB
ANION GAP SERPL CALCULATED.3IONS-SCNC: 24 MMOL/L (ref 7–15)
BUN SERPL-MCNC: 39.1 MG/DL (ref 8–23)
CALCIUM SERPL-MCNC: 9.7 MG/DL (ref 8.8–10.2)
CHLORIDE SERPL-SCNC: 107 MMOL/L (ref 98–107)
CREAT SERPL-MCNC: 1.84 MG/DL (ref 0.67–1.17)
DEPRECATED HCO3 PLAS-SCNC: 15 MMOL/L (ref 22–29)
GFR SERPL CREATININE-BSD FRML MDRD: 37 ML/MIN/1.73M2
GLUCOSE SERPL-MCNC: 62 MG/DL (ref 70–99)
HCV AB SERPL QL IA: NONREACTIVE
HIV 1+2 AB+HIV1 P24 AG SERPL QL IA: NONREACTIVE
POTASSIUM SERPL-SCNC: 5.3 MMOL/L (ref 3.4–5.3)
SODIUM SERPL-SCNC: 146 MMOL/L (ref 136–145)

## 2022-07-05 NOTE — RESULT ENCOUNTER NOTE
"Jose Guadalupe Shin and Yaneth - great to have seen you Kip.  Sorry for any \"snafu\" with the labs - I gather you may have had to come back in for the A1c - don't know what happened there - again, sorry about that (I had ordered them for before the visit - but that had not happened).    As you can see your A1c is a little better at 8.1 - nice work!  Your magnesium is actually a little high.  Maybe cutting the dose in half would be sufficient - OK?      Your kidney function has gone slightly down since last test - you are in CKD3b and the recommendation is to see a nephrologist at that level.  OK?  I am going to ask our clinic scheduler to specifically get you in with Sg Chairez at this address:  02/10/22   NEPHROLOGY REFERRAL  [facility]  [address] [place] [address], [address]  [place] [address]  Phone: [phone]  Fax: [phone]     One of the problems previously is that the referral may have gone to central [place] scheduling who try to get you in with a [place] provider.    OK?  Take care!  See you in about 3 months.  Mo Lovett"

## 2022-07-22 ENCOUNTER — TRANSFERRED RECORDS (OUTPATIENT)
Dept: HEALTH INFORMATION MANAGEMENT | Facility: CLINIC | Age: 82
End: 2022-07-22

## 2022-10-30 ENCOUNTER — HEALTH MAINTENANCE LETTER (OUTPATIENT)
Age: 82
End: 2022-10-30

## 2022-12-14 ENCOUNTER — TELEPHONE (OUTPATIENT)
Dept: FAMILY MEDICINE | Facility: CLINIC | Age: 82
End: 2022-12-14

## 2022-12-14 ENCOUNTER — VIRTUAL VISIT (OUTPATIENT)
Dept: FAMILY MEDICINE | Facility: CLINIC | Age: 82
End: 2022-12-14
Payer: COMMERCIAL

## 2022-12-14 DIAGNOSIS — U07.1 INFECTION DUE TO 2019 NOVEL CORONAVIRUS: Primary | ICD-10-CM

## 2022-12-14 PROCEDURE — 99214 OFFICE O/P EST MOD 30 MIN: CPT | Mod: 95

## 2022-12-14 RX ORDER — NIRMATRELVIR AND RITONAVIR 150-100 MG
2 KIT ORAL 2 TIMES DAILY
Status: CANCELLED | OUTPATIENT
Start: 2022-12-14 | End: 2023-01-23

## 2022-12-14 NOTE — TELEPHONE ENCOUNTER
COVID Positive/Requesting COVID treatment    Patient is positive for COVID and requesting treatment options.    Date of positive COVID test (PCR or at home)? 12/14/2022  Current COVID symptoms: fever or chills and muscle or body aches  Date COVID symptoms began: 12/12/2022    Message should be routed to clinic RN pool. Best phone number to use for call back: [phone]

## 2022-12-14 NOTE — PATIENT INSTRUCTIONS
COVID-19 Outpatient Treatments  Your care team can help you find the best treatments for COVID-19. Talk to a health care provider or refer to the FDA medicine fact sheets below.    Important: You can't have Paxlovid or molnupiravir if you're starting the medicine more than 5 days after your symptoms have started.  Paxlovid: https://www.fda.gov/media/575435/download  Molnupiravir (Lagevrio): https://www.fda.gov/media/664537/download  Paxlovid (nimatrelvir and ritonavir)  How it works  Two medicines (nirmatrelvir and ritonavir) are taken together. They stop the virus from growing. Less amount of virus is easier for your body to fight.  Benefits  Lowers risk of a hospital stay or death from COVID-19.  How to take    Medicine comes in a daily container with both medicine tablets. Take by mouth twice daily (once in the morning, once at night) for 5 days.    The number of tablets to take varies by patient.    Don't chew or break capsules. Swallow whole.  When to take  Take as soon as possible after positive COVID-19 test result, and within 5 days of your first symptoms.  Who can take it  Patients must be 12 years or older, weigh at least 88 pounds, and have tested positive for COVID-19. Paxlovid is the preferred treatment for pregnant patients.  Possible side effects  Can cause altered sense of taste, diarrhea (loose, watery stools), high blood pressure, muscle aches.  Medicine conflicts    Some medicines may conflict with Paxlovid and may cause serious side effects.    Tell your care team about all the medicines you take, including prescription and over-the-counter medicines, vitamins, and herbal supplements.    Your care team will review your medicines to make sure that you can safely take Paxlovid.  Cautions    Paxlovid is not advised for patients with severe kidney or liver disease. If you have kidney or liver problems, the dose may need to be changed.    If you're pregnant or breastfeeding, talk to your care team  about your options.    If you take hormonal birth control (such as the Pill), then you or your partner should also use a non-hormonal form of birth control (such as a condom). Keep doing this for 1 menstrual cycle after your last dose of Paxlovid.  Molnupiravir (Lagevrio)  How it works  Stops the virus from growing. Less amount of virus is easier for your body to fight.  Benefits  Lowers risk of a hospital stay or death from COVID-19.  How to take  Take 4 capsules by mouth every 12 hours (4 in the morning and 4 at night) for 5 days. Don't chew or break capsules. Swallow whole.  When to take  Take as soon as possible after positive COVID-19 test result, and within 5 days of your first symptoms.  Who can take it  Patients must be 18 years or older and have tested positive for COVID-19.  Possible side effects  Diarrhea (loose, watery stools), nausea (feeling sick to your stomach), dizziness, headaches.  Medicine conflicts  Right now, there are no known conflicts with other drugs. But tell your care team about all medicines you take.  Cautions    This medicine is not advised for patients who are pregnant.    If you are someone who could become pregnant, use trusted birth control until 4 days after your last dose of molnupiravir.    If your partner could become pregnant, you should use trusted birth control until 3 months after your last dose of molnupiravir.  For informational purposes only. Not to replace the advice of your health care provider. Copyright   2022 [facility]. All rights reserved. Clinically reviewed by Meli Donovan, PharmD, BCACP. [employer] [id number] - REV 12/22.

## 2022-12-14 NOTE — PROGRESS NOTES
Senthil is a 82 year old who is being evaluated via a billable telephone visit.      What phone number would you like to be contacted at? [phone]  How would you like to obtain your AVS? Silvia    Distant Location (provider location):  Off-site    Assessment & Plan     Infection due to 2019 novel coronavirus  Symptoms onsent 12/12, worsening, no SOB or chest pain. Hx of CKD, last GFR in 07/2022 was 37. Will provide renally dosed paxlovid, counseled patient to stop taking atorvastatin and take half dose of amlodipine until 3 days after completing paxlovid therapy.  - nirmatrelvir and ritonavir (PAXLOVID) therapy pack; Take 2 tablets by mouth 2 times daily for 5 days (Take one tablet of Nirmatelvir and 1 tablet of Ritonavir twice daily for 5 days)       RTC as needed or if symptoms do not improve.       Perico Olguin, [facility]   Senthil is a 82 year old presenting for the following health issues:  COVID-19 infection.  Symptoms onset 12/12 with myalgias, worsening since.  Dry cough.  No shortness of breath or chest pain.  Diarrhea intermittently, no more than 1-2 episodes.  Got third COVID booster but has not received by bivalent booster.    HPI       COVID-19 Symptom Review  How many days ago did these symptoms start? Monday     Are any of the following symptoms significant for you?    New or worsening difficulty breathing? No    Worsening cough? Yes, it's a dry cough.     Fever or chills? Yes, the highest temperature was 100.1    Headache: No    Sore throat: No    Chest pain: No    Diarrhea: YES, few episodes    Body aches? YES    What treatments has patient tried? Acetaminophen   Does patient live in a nursing home, group home, or shelter? No  Does patient have a way to get food/medications during quarantined? Yes, I have a friend or family member who can help me.      Review of Systems   Constitutional, HEENT, cardiovascular, pulmonary, gi and gu systems are negative,  except as otherwise noted.      Objective          Vitals:  No vitals were obtained today due to virtual visit.    Physical Exam   healthy, alert and no distress  PSYCH: Alert and oriented times 3; coherent speech, normal   rate and volume, able to articulate logical thoughts, able   to abstract reason, no tangential thoughts, no hallucinations   or delusions  His affect is normal  RESP: No cough, no audible wheezing, able to talk in full sentences  Remainder of exam unable to be completed due to telephone visits        Phone call duration: 30 minutes

## 2022-12-14 NOTE — PROGRESS NOTES
Preceptor Attestation:   I discussed the patient with the resident. I talked to the patient on the phone. I have verified the content of the note, which accurately reflects my assessment of the patient and the plan of care.   Supervising Physician:  Archie Marshall MD.

## 2022-12-14 NOTE — TELEPHONE ENCOUNTER
No SOB,     Aspirin help w/ fever    Fever today 101.    RN COVID TREATMENT VISIT  12/14/22    Senthil Castillo  82 year old  Current weight? 223 lbs    Has the patient been seen by a primary care provider at an [facility] or [facility] within the past two years? Yes.   Have you been in close proximity to/do you have a known exposure to a person with a confirmed case of influenza? No.     Date of positive COVID test (PCR or at home)?  12/14/22 at home    Current COVID symptoms: fever or chills, cough and muscle or body aches    Date COVID symptoms began: 12/12/22    Do you have any of the following conditions that place you at risk of being very sick from COVID-19? 65 years or older, chronic kidney disease, diabetes and heart conditions    Is patient eligible to continue? Yes, established patient, 12 years or older weighing at least 88.2 lbs, who has COVID symptoms that started in the past 5 days and is at risk for being very sick from COVID-19.       Have you received monoclonal antibodies or oral antiviral medications since testing positive to COVID-19? No    Are you currently hospitalized for COVID-19? No    Do you have a history of hepatitis? No    Are you currently pregnant or nursing? No    Do you have a clinically significant hypersensitivity to nirmatrelvir, ritonavir, or molnupiravir? No    Do you have any history of severe renal impairment (eGFR < 30mL/min)? No    Do you have any history of hepatic impairment or abnormalities (e.g. hepatic panel, ALT, AST, ALK Phos, bilirubin)? No    Have you had a coronary stent placed in the previous 6 months? No    Is patient eligible to continue?   Yes, patient meets all eligibility requirements for the RN COVID treatment (as denoted by all no responses above).     Current Outpatient Medications   Medication Sig Dispense Refill     amLODIPine (NORVASC) 10 MG tablet Take 1 tablet (10 mg) by mouth daily 90 tablet 1     aspirin (ASA) 325 MG  tablet Take 1 tablet (325 mg) by mouth every other day 90 tablet 1     atorvastatin (LIPITOR) 40 MG tablet Take 1 tablet (40 mg) by mouth daily 90 tablet 1     blood glucose (ONETOUCH ULTRA) test strip Use up to 3 times daily 300 strip 1     insulin NPH-insulin regular MIX (NOVOLIN MIX VIAL) (70-30) 100 UNIT/ML Inject 30 Units Subcutaneous 2 times daily (with meals)        insulin pen needle (BD ULTRA-FINE) 29G X 12.7MM miscellaneous Use twice daily 180 each 1     lisinopril (ZESTRIL) 40 MG tablet Take 1 tablet (40 mg) by mouth daily 90 tablet 1     magnesium 250 MG tablet Take 1 tablet (250 mg) by mouth At Bedtime 90 tablet 1     metFORMIN (GLUCOPHAGE) 500 MG tablet TAKE 1 TABLET BY MOUTH TWICE A DAY WITH MEALS 180 tablet 1     metoprolol succinate ER (TOPROL XL) 100 MG 24 hr tablet TAKE 2 TABLETS BY MOUTH IN THE MORNING AND 1 TABLET AT NIGHT. 270 tablet 1     No magnesium     Medications from List 1 of the standing order (on medications that exclude the use of Paxlovid) that patient is taking: NONE. Is patient taking Gorge's Wort? No  Is patient taking [place]'s Wort or any meds from List 1? No.   Medications from List 2 of the standing order (on meds that provider needs to adjust) that patient is taking: amlodipine (Norvasc), explained a provider visit is necessary to discuss medication adjustments while taking Paxlovid. Is patient on any of the meds from List 2? Yes. Patient will be transferred to a  at the end of this call. Alyx Oshea RN     Routing to provider as FYI as to why pt is being seen today via televisit. Pt advised to go to ER if he feels worse before appointment time.    Alyx Oshea RN on 12/14/2022 at 12:20 PM

## 2023-01-29 DIAGNOSIS — E11.22 TYPE 2 DIABETES MELLITUS WITH STAGE 3A CHRONIC KIDNEY DISEASE, WITH LONG-TERM CURRENT USE OF INSULIN (H): ICD-10-CM

## 2023-01-29 DIAGNOSIS — R80.1 PERSISTENT PROTEINURIA: ICD-10-CM

## 2023-01-29 DIAGNOSIS — N18.31 TYPE 2 DIABETES MELLITUS WITH STAGE 3A CHRONIC KIDNEY DISEASE, WITH LONG-TERM CURRENT USE OF INSULIN (H): ICD-10-CM

## 2023-01-29 DIAGNOSIS — Z79.4 TYPE 2 DIABETES MELLITUS WITH STAGE 3A CHRONIC KIDNEY DISEASE, WITH LONG-TERM CURRENT USE OF INSULIN (H): ICD-10-CM

## 2023-01-29 DIAGNOSIS — I10 ESSENTIAL HYPERTENSION: ICD-10-CM

## 2023-01-30 RX ORDER — AMLODIPINE BESYLATE 10 MG/1
10 TABLET ORAL DAILY
Qty: 90 TABLET | Refills: 1 | Status: SHIPPED | OUTPATIENT
Start: 2023-01-30 | End: 2023-02-13

## 2023-01-30 RX ORDER — ATORVASTATIN CALCIUM 40 MG/1
TABLET, FILM COATED ORAL
Qty: 90 TABLET | Refills: 1 | Status: SHIPPED | OUTPATIENT
Start: 2023-01-30 | End: 2023-02-13

## 2023-01-30 RX ORDER — LISINOPRIL 40 MG/1
TABLET ORAL
Qty: 90 TABLET | Refills: 1 | Status: SHIPPED | OUTPATIENT
Start: 2023-01-30 | End: 2023-02-13

## 2023-02-13 ENCOUNTER — OFFICE VISIT (OUTPATIENT)
Dept: FAMILY MEDICINE | Facility: CLINIC | Age: 83
End: 2023-02-13
Payer: COMMERCIAL

## 2023-02-13 VITALS
HEIGHT: 71 IN | HEART RATE: 78 BPM | RESPIRATION RATE: 12 BRPM | BODY MASS INDEX: 31.84 KG/M2 | SYSTOLIC BLOOD PRESSURE: 154 MMHG | DIASTOLIC BLOOD PRESSURE: 81 MMHG | TEMPERATURE: 97.8 F | WEIGHT: 227.4 LBS | OXYGEN SATURATION: 98 %

## 2023-02-13 DIAGNOSIS — N18.31 TYPE 2 DIABETES MELLITUS WITH STAGE 3A CHRONIC KIDNEY DISEASE, WITH LONG-TERM CURRENT USE OF INSULIN (H): Primary | ICD-10-CM

## 2023-02-13 DIAGNOSIS — I12.9 RENAL HYPERTENSION: ICD-10-CM

## 2023-02-13 DIAGNOSIS — E11.22 TYPE 2 DIABETES MELLITUS WITH STAGE 3A CHRONIC KIDNEY DISEASE, WITH LONG-TERM CURRENT USE OF INSULIN (H): Primary | ICD-10-CM

## 2023-02-13 DIAGNOSIS — E83.42 HYPOMAGNESEMIA: ICD-10-CM

## 2023-02-13 DIAGNOSIS — R80.1 PERSISTENT PROTEINURIA: ICD-10-CM

## 2023-02-13 DIAGNOSIS — Z79.4 TYPE 2 DIABETES MELLITUS WITH STAGE 3A CHRONIC KIDNEY DISEASE, WITH LONG-TERM CURRENT USE OF INSULIN (H): Primary | ICD-10-CM

## 2023-02-13 DIAGNOSIS — I10 ESSENTIAL HYPERTENSION: ICD-10-CM

## 2023-02-13 LAB
ANION GAP SERPL CALCULATED.3IONS-SCNC: 13 MMOL/L (ref 7–15)
BUN SERPL-MCNC: 33.7 MG/DL (ref 8–23)
CALCIUM SERPL-MCNC: 9.7 MG/DL (ref 8.8–10.2)
CHLORIDE SERPL-SCNC: 107 MMOL/L (ref 98–107)
CHOLEST SERPL-MCNC: 163 MG/DL
CREAT SERPL-MCNC: 1.67 MG/DL (ref 0.67–1.17)
CREAT UR-MCNC: 143 MG/DL
DEPRECATED HCO3 PLAS-SCNC: 23 MMOL/L (ref 22–29)
ERYTHROCYTE [DISTWIDTH] IN BLOOD BY AUTOMATED COUNT: 13.3 % (ref 10–15)
GFR SERPL CREATININE-BSD FRML MDRD: 41 ML/MIN/1.73M2
GLUCOSE SERPL-MCNC: 143 MG/DL (ref 70–99)
HBA1C MFR BLD: 8.5 % (ref 0–5.6)
HCT VFR BLD AUTO: 34 % (ref 40–53)
HDLC SERPL-MCNC: 52 MG/DL
HGB BLD-MCNC: 11 G/DL (ref 13.3–17.7)
LDLC SERPL CALC-MCNC: 81 MG/DL
MCH RBC QN AUTO: 30.3 PG (ref 26.5–33)
MCHC RBC AUTO-ENTMCNC: 32.4 G/DL (ref 31.5–36.5)
MCV RBC AUTO: 94 FL (ref 78–100)
MICROALBUMIN UR-MCNC: 1046 MG/L
MICROALBUMIN/CREAT UR: 731.47 MG/G CR (ref 0–17)
NONHDLC SERPL-MCNC: 111 MG/DL
PLATELET # BLD AUTO: 314 10E3/UL (ref 150–450)
POTASSIUM SERPL-SCNC: 5.3 MMOL/L (ref 3.4–5.3)
RBC # BLD AUTO: 3.63 10E6/UL (ref 4.4–5.9)
SODIUM SERPL-SCNC: 143 MMOL/L (ref 136–145)
TRIGL SERPL-MCNC: 151 MG/DL
WBC # BLD AUTO: 6.3 10E3/UL (ref 4–11)

## 2023-02-13 PROCEDURE — 82570 ASSAY OF URINE CREATININE: CPT | Performed by: FAMILY MEDICINE

## 2023-02-13 PROCEDURE — 36415 COLL VENOUS BLD VENIPUNCTURE: CPT | Performed by: FAMILY MEDICINE

## 2023-02-13 PROCEDURE — 85027 COMPLETE CBC AUTOMATED: CPT | Performed by: FAMILY MEDICINE

## 2023-02-13 PROCEDURE — 80061 LIPID PANEL: CPT | Performed by: FAMILY MEDICINE

## 2023-02-13 PROCEDURE — 99214 OFFICE O/P EST MOD 30 MIN: CPT | Performed by: FAMILY MEDICINE

## 2023-02-13 PROCEDURE — 83036 HEMOGLOBIN GLYCOSYLATED A1C: CPT | Performed by: FAMILY MEDICINE

## 2023-02-13 PROCEDURE — 82043 UR ALBUMIN QUANTITATIVE: CPT | Performed by: FAMILY MEDICINE

## 2023-02-13 PROCEDURE — 80048 BASIC METABOLIC PNL TOTAL CA: CPT | Performed by: FAMILY MEDICINE

## 2023-02-13 RX ORDER — AMLODIPINE BESYLATE 10 MG/1
10 TABLET ORAL DAILY
Qty: 90 TABLET | Refills: 1 | Status: SHIPPED | OUTPATIENT
Start: 2023-02-13 | End: 2023-11-02

## 2023-02-13 RX ORDER — PEN NEEDLE, DIABETIC 29 G X1/2"
NEEDLE, DISPOSABLE MISCELLANEOUS
Qty: 180 EACH | Refills: 1 | Status: SHIPPED | OUTPATIENT
Start: 2023-02-13

## 2023-02-13 RX ORDER — METOPROLOL SUCCINATE 100 MG/1
TABLET, EXTENDED RELEASE ORAL
Qty: 270 TABLET | Refills: 1 | Status: SHIPPED | OUTPATIENT
Start: 2023-02-13 | End: 2023-11-02

## 2023-02-13 RX ORDER — MULTIVITAMIN WITH IRON
1 TABLET ORAL AT BEDTIME
Qty: 90 TABLET | Refills: 1 | Status: SHIPPED | OUTPATIENT
Start: 2023-02-13 | End: 2023-11-02

## 2023-02-13 RX ORDER — ASPIRIN 325 MG
325 TABLET ORAL EVERY OTHER DAY
Qty: 90 TABLET | Refills: 1 | Status: SHIPPED | OUTPATIENT
Start: 2023-02-13 | End: 2024-05-16

## 2023-02-13 RX ORDER — LISINOPRIL 40 MG/1
40 TABLET ORAL DAILY
Qty: 90 TABLET | Refills: 1 | Status: SHIPPED | OUTPATIENT
Start: 2023-02-13 | End: 2023-11-02

## 2023-02-13 RX ORDER — ATORVASTATIN CALCIUM 40 MG/1
40 TABLET, FILM COATED ORAL DAILY
Qty: 90 TABLET | Refills: 1 | Status: SHIPPED | OUTPATIENT
Start: 2023-02-13 | End: 2023-11-02

## 2023-02-13 NOTE — PROGRESS NOTES
Senthil was seen today for diabetes and medication reconciliation.    Diagnoses and all orders for this visit:    Type 2 diabetes mellitus with stage 3a chronic kidney disease, with long-term current use of insulin (H)  -     Albumin Random Urine Quantitative with Creat Ratio  -     Lipid Profile  -     Hemoglobin A1c  -     Basic metabolic panel  -     aspirin (ASA) 325 MG tablet; Take 1 tablet (325 mg) by mouth every other day  -     atorvastatin (LIPITOR) 40 MG tablet; Take 1 tablet (40 mg) by mouth daily  -     blood glucose (ONETOUCH ULTRA) test strip; Use up to 3 times daily  -     insulin pen needle (BD ULTRA-FINE) 29G X 12.7MM miscellaneous; Use twice daily  -     lisinopril (ZESTRIL) 40 MG tablet; Take 1 tablet (40 mg) by mouth daily  -     metFORMIN (GLUCOPHAGE) 500 MG tablet; Take 1 tablet (500 mg) by mouth 2 times daily (with meals)  -     empagliflozin (JARDIANCE) 10 MG TABS tablet; Take 1 tablet (10 mg) by mouth daily  -     Basic metabolic panel; Future  -     Hemoglobin A1c; Future  -     CBC with platelets; Future  -     CBC with platelets; Future  -     CBC with platelets    Persistent proteinuria  -     Albumin Random Urine Quantitative with Creat Ratio  -     lisinopril (ZESTRIL) 40 MG tablet; Take 1 tablet (40 mg) by mouth daily    Essential hypertension  -     amLODIPine (NORVASC) 10 MG tablet; Take 1 tablet (10 mg) by mouth daily  -     lisinopril (ZESTRIL) 40 MG tablet; Take 1 tablet (40 mg) by mouth daily    Hypomagnesemia  -     magnesium 250 MG tablet; Take 1 tablet (250 mg) by mouth At Bedtime    Renal hypertension  -     metoprolol succinate ER (TOPROL XL) 100 MG 24 hr tablet; TAKE 2 TABLETS BY MOUTH IN THE MORNING AND 1 TABLET AT NIGHT.      After discussion he consents for me to send a prescription for empagliflozin to [place] [facility] who hopefully can assist him in getting this covered through his insurance.  He indicates that he will not spend an exorbitant amount on the  prescription however    I refilled his other medications.  He purchases insulin over-the-counter and has not changed this practice for many years.    I will notify him of his lab results and we will also copy these to his nephrologist.  I'll indicate whether he needs to follow with his nephrologist based on these lab results.  I have also placed future orders for labs in about 6 months time again.    Total visit time with patient was 25 mins, all of which was face to face MD time, and over 50% of this time was spent in counseling and coordination of care.  Including post-encounter documentation and orders, total encounter time was 32 mins.      Subjective:  Kip is an 82-year-old who attends today for a routine 6 monthly diabetes follow-up.  He did have COVID in December 2022 and reports that he remained unwell for about 6 weeks after that infection.  He is now doing okay.    He did visit with nephrology about 6 months ago.  He recalls that an SGLT2 inhibitor was recommended.  I had prescribed this previously about 2 years ago and due to the price, patient had not purchased a month supply for $400.  This seems completely reasonable but I suggested that we could try represcribing it today with the assistance of [facility] [facility].  In addition several years ago he had needed iron supplementation and we therefore agreed to recheck labs today and I shall notify him of the results.    ROS:  Neuro: He acknowledges that his tremor may have worsened and in fact spills a glass of water over himself as he attempts to drink it.  He does not want to visit with neurology however.  He says that he is able to mobilize without any difficulty apart from the hand tremor.  In addition, he gets occasional numbness or tingling in his extremities but this is not a consistent sensation and he is able to feel things with his feet.  We agreed to check his feet today.    Eyes: He goes in every year for a diabetic eye  "check.    Objective:  BP (!) 154/81 (BP Location: Left arm, Patient Position: Sitting, Cuff Size: Adult Large)   Pulse 78   Temp 97.8  F (36.6  C) (Oral)   Resp 12   Ht 1.803 m (5' 11\")   Wt 103.1 kg (227 lb 6.4 oz)   SpO2 98%   BMI 31.72 kg/m    His systolic BP is mildly elevated but he has not yet taken today's antihypertensives because he has run out of metoprolol.  His BMI is just over the obese range.  He is in no acute distress.  As noted he has a moderately severe hand tremor which has previously been diagnosed as benign essential tremor.  His pulse is regular.  He has no lower extremity edema.  His feet are examined and are in very good condition.  Microfilament testing is negative for neuropathy and he has good dorsalis pedis pulses bilaterally and has no sores nor calluses.    Results for orders placed or performed in visit on 02/13/23   Albumin Random Urine Quantitative with Creat Ratio     Status: Abnormal   Result Value Ref Range    Creatinine Urine mg/dL 143.0 mg/dL    Albumin Urine mg/L 1,046.0 mg/L    Albumin Urine mg/g Cr 731.47 (H) 0.00 - 17.00 mg/g Cr   Lipid Profile     Status: Abnormal   Result Value Ref Range    Cholesterol 163 <200 mg/dL    Triglycerides 151 (H) <150 mg/dL    Direct Measure HDL 52 >=40 mg/dL    LDL Cholesterol Calculated 81 <=100 mg/dL    Non HDL Cholesterol 111 <130 mg/dL    Narrative    Cholesterol  Desirable:  <200 mg/dL    Triglycerides  Normal:  Less than 150 mg/dL  Borderline High:  150-199 mg/dL  High:  200-499 mg/dL  Very High:  Greater than or equal to 500 mg/dL    Direct Measure HDL  Female:  Greater than or equal to 50 mg/dL   Male:  Greater than or equal to 40 mg/dL    LDL Cholesterol  Desirable:  <100mg/dL  Above Desirable:  100-129 mg/dL   Borderline High:  130-159 mg/dL   High:  160-189 mg/dL   Very High:  >= 190 mg/dL    Non HDL Cholesterol  Desirable:  130 mg/dL  Above Desirable:  130-159 mg/dL  Borderline High:  160-189 mg/dL  High:  190-219 " mg/dL  Very High:  Greater than or equal to 220 mg/dL   Hemoglobin A1c     Status: Abnormal   Result Value Ref Range    Hemoglobin A1C 8.5 (H) 0.0 - 5.6 %   Basic metabolic panel     Status: Abnormal   Result Value Ref Range    Sodium 143 136 - 145 mmol/L    Potassium 5.3 3.4 - 5.3 mmol/L    Chloride 107 98 - 107 mmol/L    Carbon Dioxide (CO2) 23 22 - 29 mmol/L    Anion Gap 13 7 - 15 mmol/L    Urea Nitrogen 33.7 (H) 8.0 - 23.0 mg/dL    Creatinine 1.67 (H) 0.67 - 1.17 mg/dL    Calcium 9.7 8.8 - 10.2 mg/dL    Glucose 143 (H) 70 - 99 mg/dL    GFR Estimate 41 (L) >60 mL/min/1.73m2   CBC with platelets     Status: Abnormal   Result Value Ref Range    WBC Count 6.3 4.0 - 11.0 10e3/uL    RBC Count 3.63 (L) 4.40 - 5.90 10e6/uL    Hemoglobin 11.0 (L) 13.3 - 17.7 g/dL    Hematocrit 34.0 (L) 40.0 - 53.0 %    MCV 94 78 - 100 fL    MCH 30.3 26.5 - 33.0 pg    MCHC 32.4 31.5 - 36.5 g/dL    RDW 13.3 10.0 - 15.0 %    Platelet Count 314 150 - 450 10e3/uL

## 2023-02-14 NOTE — RESULT ENCOUNTER NOTE
Jose Guadalupe Shin and Yaneth - as you can see, overall your labs are not significantly changed.  Your creatinine and GFR (markers of kidney function) are a little better than last time but still reduced.  You do lose protein in your urine.  Your bad cholesterol LDL is low but could be lower - do you want me to increase Atorvastatin to the maximum dose which is 80mgs?  Let me know by reply - thanks!  Your hemoglobin is stable so you don't necessarily need iron or booster injections again just yet.  I will copy the kidney doctor on the results too.  Up to you if you want to see him - or just follow up in about 6 months with repeat labs.  I've placed the orders so you just need to call to schedule a lab appointment some time before our visit.  OK?  Take care!  oM Lovett

## 2023-05-18 ENCOUNTER — OFFICE VISIT (OUTPATIENT)
Dept: FAMILY MEDICINE | Facility: CLINIC | Age: 83
End: 2023-05-18
Payer: COMMERCIAL

## 2023-05-18 ENCOUNTER — TELEPHONE (OUTPATIENT)
Dept: FAMILY MEDICINE | Facility: CLINIC | Age: 83
End: 2023-05-18

## 2023-05-18 VITALS
HEART RATE: 65 BPM | TEMPERATURE: 97.7 F | BODY MASS INDEX: 32.42 KG/M2 | WEIGHT: 231.6 LBS | SYSTOLIC BLOOD PRESSURE: 171 MMHG | DIASTOLIC BLOOD PRESSURE: 79 MMHG | RESPIRATION RATE: 12 BRPM | HEIGHT: 71 IN | OXYGEN SATURATION: 99 %

## 2023-05-18 DIAGNOSIS — N18.32 TYPE 2 DIABETES MELLITUS WITH STAGE 3B CHRONIC KIDNEY DISEASE, WITH LONG-TERM CURRENT USE OF INSULIN (H): ICD-10-CM

## 2023-05-18 DIAGNOSIS — N18.32 STAGE 3B CHRONIC KIDNEY DISEASE (H): ICD-10-CM

## 2023-05-18 DIAGNOSIS — N18.31 TYPE 2 DIABETES MELLITUS WITH STAGE 3A CHRONIC KIDNEY DISEASE, WITH LONG-TERM CURRENT USE OF INSULIN (H): Primary | ICD-10-CM

## 2023-05-18 DIAGNOSIS — Z79.4 TYPE 2 DIABETES MELLITUS WITH STAGE 3B CHRONIC KIDNEY DISEASE, WITH LONG-TERM CURRENT USE OF INSULIN (H): ICD-10-CM

## 2023-05-18 DIAGNOSIS — K40.90 NON-RECURRENT UNILATERAL INGUINAL HERNIA WITHOUT OBSTRUCTION OR GANGRENE: ICD-10-CM

## 2023-05-18 DIAGNOSIS — E11.22 TYPE 2 DIABETES MELLITUS WITH STAGE 3A CHRONIC KIDNEY DISEASE, WITH LONG-TERM CURRENT USE OF INSULIN (H): Primary | ICD-10-CM

## 2023-05-18 DIAGNOSIS — Z79.4 TYPE 2 DIABETES MELLITUS WITH STAGE 3A CHRONIC KIDNEY DISEASE, WITH LONG-TERM CURRENT USE OF INSULIN (H): Primary | ICD-10-CM

## 2023-05-18 DIAGNOSIS — E11.22 TYPE 2 DIABETES MELLITUS WITH STAGE 3B CHRONIC KIDNEY DISEASE, WITH LONG-TERM CURRENT USE OF INSULIN (H): ICD-10-CM

## 2023-05-18 LAB
ANION GAP SERPL CALCULATED.3IONS-SCNC: 10 MMOL/L (ref 7–15)
BUN SERPL-MCNC: 36.3 MG/DL (ref 8–23)
CALCIUM SERPL-MCNC: 9.5 MG/DL (ref 8.8–10.2)
CHLORIDE SERPL-SCNC: 106 MMOL/L (ref 98–107)
CHOLEST SERPL-MCNC: 144 MG/DL
CREAT SERPL-MCNC: 1.68 MG/DL (ref 0.67–1.17)
DEPRECATED HCO3 PLAS-SCNC: 23 MMOL/L (ref 22–29)
ERYTHROCYTE [DISTWIDTH] IN BLOOD BY AUTOMATED COUNT: 12.8 % (ref 10–15)
GFR SERPL CREATININE-BSD FRML MDRD: 41 ML/MIN/1.73M2
GLUCOSE SERPL-MCNC: 254 MG/DL (ref 70–99)
HBA1C MFR BLD: 8.6 % (ref 0–5.6)
HCT VFR BLD AUTO: 31.6 % (ref 40–53)
HDLC SERPL-MCNC: 48 MG/DL
HGB BLD-MCNC: 10.4 G/DL (ref 13.3–17.7)
LDLC SERPL CALC-MCNC: 61 MG/DL
MCH RBC QN AUTO: 31 PG (ref 26.5–33)
MCHC RBC AUTO-ENTMCNC: 32.9 G/DL (ref 31.5–36.5)
MCV RBC AUTO: 94 FL (ref 78–100)
NONHDLC SERPL-MCNC: 96 MG/DL
PLATELET # BLD AUTO: 279 10E3/UL (ref 150–450)
POTASSIUM SERPL-SCNC: 5.5 MMOL/L (ref 3.4–5.3)
RBC # BLD AUTO: 3.36 10E6/UL (ref 4.4–5.9)
SODIUM SERPL-SCNC: 139 MMOL/L (ref 136–145)
TRIGL SERPL-MCNC: 175 MG/DL
WBC # BLD AUTO: 5.6 10E3/UL (ref 4–11)

## 2023-05-18 PROCEDURE — 36415 COLL VENOUS BLD VENIPUNCTURE: CPT | Performed by: FAMILY MEDICINE

## 2023-05-18 PROCEDURE — 83036 HEMOGLOBIN GLYCOSYLATED A1C: CPT | Performed by: FAMILY MEDICINE

## 2023-05-18 PROCEDURE — 80048 BASIC METABOLIC PNL TOTAL CA: CPT | Performed by: FAMILY MEDICINE

## 2023-05-18 PROCEDURE — 80061 LIPID PANEL: CPT | Performed by: FAMILY MEDICINE

## 2023-05-18 PROCEDURE — 99214 OFFICE O/P EST MOD 30 MIN: CPT | Performed by: FAMILY MEDICINE

## 2023-05-18 PROCEDURE — 85027 COMPLETE CBC AUTOMATED: CPT | Performed by: FAMILY MEDICINE

## 2023-05-18 NOTE — PROGRESS NOTES
"Assessment & Plan   Problem List Items Addressed This Visit        Medium    Type 2 diabetes mellitus with diabetic chronic kidney disease (H) - Primary    Relevant Orders    CBC with platelets (Completed)    Inguinal hernia    Chronic kidney disease, stage III (moderate) (H)    Relevant Medications    ferrous sulfate (FEROSUL) 325 (65 Fe) MG tablet (Start on 5/20/2023)        Review of the result(s) of each unique test - CT Abdomen Pelvis 2010  Revealed bilateral fat containing inguinal hernias.    BMI:   Estimated body mass index is 32.3 kg/m  as calculated from the following:    Height as of this encounter: 1.803 m (5' 11\").    Weight as of this encounter: 105.1 kg (231 lb 9.6 oz).   Weight management plan: Discussed healthy diet and exercise guidelines    Work on weight loss  Regular exercise  Return 3-6 months for follow up.    Offered referral to General Surgery - declined for now due to relatively asymptomatic.  Wife thought it had to be repaired.    Will see if pharmacy team can assist in making empagliglozin more affordable.  Otherwise will continue with usual medications.      Will notify of remaining lab results.      Total visit time with patient was 25 mins, all of which was face to face MD time, and over 50% of this time was spent in counseling and coordination of care.  Including post-encounter documentation and orders, total encounter time was 32 mins.      Return in about 3 months (around 8/18/2023), or if symptoms worsen or fail to improve.    Mo Lovett MD  [facility] JEANNETTE Ndiaye is a 82 year old, presenting for the following health issues:  Hernia (Like to get hernia check) and Medication Reconciliation (Med reviewed)        5/18/2023     9:36 AM   Additional Questions   Roomed by Machelle   Accompanied by patient(self)     HPI     Diabetes Follow-up    How often are you checking your blood sugar? Two times daily  Blood sugar testing frequency justification:  " "Patient modifying lifestyle changes (diet, exercise) with blood sugars  What time of day are you checking your blood sugars (select all that apply)?  Before and after meals  Have you had any blood sugars above 200?  Yes   Have you had any blood sugars below 70?  No     Have you had a diabetic eye exam in the last 12 months? Yes-  Location: 6/9/22 [facility]    Hyperlipidemia Follow-Up      Are you regularly taking any medication or supplement to lower your cholesterol?   Yes- .    Are you having muscle aches or other side effects that you think could be caused by your cholesterol lowering medication?  No    Hypertension Follow-up      Do you check your blood pressure regularly outside of the clinic? Yes     Are you following a low salt diet? Yes    Are your blood pressures ever more than 140 on the top number (systolic) OR more   than 90 on the bottom number (diastolic), for example 140/90? No    BP Readings from Last 2 Encounters:   05/18/23 (!) 171/79   02/13/23 (!) 154/81     Hemoglobin A1C (%)   Date Value   05/18/2023 8.6 (H)   02/13/2023 8.5 (H)   06/03/2021 8.5 (H)   01/13/2021 8.7 (H)     LDL Cholesterol Calculated (mg/dL)   Date Value   05/18/2023 61   02/13/2023 81   01/13/2021 52   11/07/2019 88       Chronic Kidney Disease Follow-up      Do you take any over the counter pain medicine?: No      Review of Systems   Neuro: Continues to have resting tremor, maybe has slightly worsened.  Not interested in a referral for this and does not want to restart taking treatment or meet medications which previously caused side effects.  GI: Has a right inguinal hernia for over 10 years that extends into his scrotum.  Is mildly uncomfortable.  However is ambivalent about having surgery to repair at.        Objective    BP (!) 171/79 (BP Location: Left arm, Patient Position: Sitting, Cuff Size: Adult Large)   Pulse 65   Temp 97.7  F (36.5  C) (Oral)   Resp 12   Ht 1.803 m (5' 11\")   Wt 105.1 kg (231 lb 9.6 " oz)   SpO2 99%   BMI 32.30 kg/m    Body mass index is 32.3 kg/m .  Physical Exam   Patient reports BP reflects whitecoat hypertension, BMI is stable.  GENERAL: healthy, alert and no distress  ABDOMEN: soft, nontender, no hepatosplenomegaly, no masses and bowel sounds normal   (male): testicles normal without atrophy or masses, large indirect inguinal hernia right side extending into scrotum.    MS: no gross musculoskeletal defects noted, no edema    Results for orders placed or performed in visit on 05/18/23   Hemoglobin A1c     Status: Abnormal   Result Value Ref Range    Hemoglobin A1C 8.6 (H) 0.0 - 5.6 %   Basic metabolic panel     Status: Abnormal   Result Value Ref Range    Sodium 139 136 - 145 mmol/L    Potassium 5.5 (H) 3.4 - 5.3 mmol/L    Chloride 106 98 - 107 mmol/L    Carbon Dioxide (CO2) 23 22 - 29 mmol/L    Anion Gap 10 7 - 15 mmol/L    Urea Nitrogen 36.3 (H) 8.0 - 23.0 mg/dL    Creatinine 1.68 (H) 0.67 - 1.17 mg/dL    Calcium 9.5 8.8 - 10.2 mg/dL    Glucose 254 (H) 70 - 99 mg/dL    GFR Estimate 41 (L) >60 mL/min/1.73m2   Lipid Profile     Status: Abnormal   Result Value Ref Range    Cholesterol 144 <200 mg/dL    Triglycerides 175 (H) <150 mg/dL    Direct Measure HDL 48 >=40 mg/dL    LDL Cholesterol Calculated 61 <=100 mg/dL    Non HDL Cholesterol 96 <130 mg/dL    Narrative    Cholesterol  Desirable:  <200 mg/dL    Triglycerides  Normal:  Less than 150 mg/dL  Borderline High:  150-199 mg/dL  High:  200-499 mg/dL  Very High:  Greater than or equal to 500 mg/dL    Direct Measure HDL  Female:  Greater than or equal to 50 mg/dL   Male:  Greater than or equal to 40 mg/dL    LDL Cholesterol  Desirable:  <100mg/dL  Above Desirable:  100-129 mg/dL   Borderline High:  130-159 mg/dL   High:  160-189 mg/dL   Very High:  >= 190 mg/dL    Non HDL Cholesterol  Desirable:  130 mg/dL  Above Desirable:  130-159 mg/dL  Borderline High:  160-189 mg/dL  High:  190-219 mg/dL  Very High:  Greater than or equal to 220 mg/dL    CBC with platelets     Status: Abnormal   Result Value Ref Range    WBC Count 5.6 4.0 - 11.0 10e3/uL    RBC Count 3.36 (L) 4.40 - 5.90 10e6/uL    Hemoglobin 10.4 (L) 13.3 - 17.7 g/dL    Hematocrit 31.6 (L) 40.0 - 53.0 %    MCV 94 78 - 100 fL    MCH 31.0 26.5 - 33.0 pg    MCHC 32.9 31.5 - 36.5 g/dL    RDW 12.8 10.0 - 15.0 %    Platelet Count 279 150 - 450 10e3/uL

## 2023-05-18 NOTE — TELEPHONE ENCOUNTER
Hello,    Does the patient have an upcoming appointment? If so, I can fax the application to the provider's office and both the patient and provider can complete the application. If not, I will MyChart the patient and see what's the best way to get the patient their section of the application. However, I still need a fax number/email to send the provider's section of the application.    Thank You!    Juwan Dias [facility] Liaison  [facility]  [email or web address]  Phone: [phone]  Fax: [phone]

## 2023-05-18 NOTE — TELEPHONE ENCOUNTER
Hello,    I have contact the patient via [employer] and I have faxed you the provider's section of the application. Pleae fill, complete and sign the application. Once completed the application can either be email/faxed back to me or else faxed directly to [facility] at [phone].    Thank You!    Juwan Dias [facility] Liaison  [facility] [email or web address]  Phone: [phone]  Fax: [phone]

## 2023-05-19 RX ORDER — FERROUS SULFATE 325(65) MG
325 TABLET ORAL
Qty: 36 TABLET | Refills: 3 | Status: SHIPPED | OUTPATIENT
Start: 2023-05-20 | End: 2023-11-02

## 2023-05-19 NOTE — RESULT ENCOUNTER NOTE
"Jose Guadalupe Shin and Yaneth - labs remain in a similar ballpark -   We discussed the A1c (and I already filled out a form to help you get reduced price Jardiance - I hope that materializes).  Otherwise, kidneys are stable.  Your potassium was mildly up - cut back on potassium in diet if you can (long list of these foods is below).  Bad cholesterol LDL is at a good number  And your hemoglobin is not low enough to need EPO.  You could be taking Iron every other day like the kidney specialist suggested - I will send in a script for you.    OK?  Take care!  Dr Mo Lovett      Foods that have the highest concentrations of potassium include cantaloupe, watermelons, grapefruit, all dried fruit and fruit juices, avocadoes, tomatoes, potatoes (plain and sweet), [place] sprouts, milk, yogurt, lentils, and most nuts (except peanuts) - see table below. It is possible to remove some of the potassium in certain vegetables with high potassium levels. Leaching is a process of soaking raw or frozen vegetables in water for at least two hours before cooking to \"pull\" some of the potassium out of the food and into the water. You should not eat these vegetables frequently because there is still a lot of potassium in the food after leaching.    Foods with high levels of potassium - LIMIT consuming these:  Grains Whole-grain breads, wheat bran, granola and granola bars  Beverages Sports drinks (Gatorade, etc), instant breakfast mix, soy milk, coffee/tea (limit to 16 fluid ounces).  Some sparkling mcleod.  Snack foods/sweets Fig cookies, chocolate (1.5 to 2 ounces), molasses (1 tablespoon)  Fruits Apricots, avocado (  whole), bananas (  whole), coconut, melon (cantaloupe and honeydew), kiwi, pao, nectarines, oranges, orange juice, papaya, pears (fresh), plantains, pomegranate (and juice), dried fruits (apricots [5 halves], dates [5], figs, prunes, raisins), prune juice, yams  Vegetables Bamboo shoots, baked or refried beans, beets, broccoli " "(cooked), [place] sprouts, cabbage (raw), carrots (raw), chard, greens (except kale), kohlrabi, olives, mushrooms (canned), potatoes (white and sweet), parsnips, pickles, pumpkin, rutabaga, sauerkraut, spinach (cooked), squash (acorn, butternut, valladares), tomato, tomato sauce, tomato juice, and vegetable juice cocktail  Dairy products Milk and milk products, buttermilk, yogurt  Proteins (3-ounce serving) Clams, sardines, scallops, lobster, whitefish, salmon (and most other fish), ground beef, sirloin steak (and most other beef products), fleming beans, kidney beans, black beans, navy beans (and most other peas and beans, serving size is   cup)  Soups Read label for potassium as many low-sodium soups and bouillon cubes or broth may have added potassium  Condiments Imitation escobedo bits, salt substitutes, or \"lite\" salt made with potassium  Nuts, seeds, and legumes Tofu, lentils, adzuki beans, most legumes, peanut butter, most nuts, and most seeds (including sunflower seeds)      "

## 2023-06-02 NOTE — TELEPHONE ENCOUNTER
"Hello,    I contacted the patient and they said they have not completed the application because they have \"decided to hold off for now\".    Thank You!    Juwan Dias [facility] Liaison  [facility] Keturah [email or web address]  Phone: [phone]  Fax: [phone]      "

## 2023-07-11 ENCOUNTER — TRANSFERRED RECORDS (OUTPATIENT)
Dept: HEALTH INFORMATION MANAGEMENT | Facility: CLINIC | Age: 83
End: 2023-07-11
Payer: COMMERCIAL

## 2023-07-11 LAB — RETINOPATHY: NEGATIVE

## 2023-09-09 ENCOUNTER — HEALTH MAINTENANCE LETTER (OUTPATIENT)
Age: 83
End: 2023-09-09

## 2023-11-01 ENCOUNTER — LAB (OUTPATIENT)
Dept: LAB | Facility: CLINIC | Age: 83
End: 2023-11-01
Payer: COMMERCIAL

## 2023-11-01 DIAGNOSIS — Z23 NEED FOR PROPHYLACTIC VACCINATION AGAINST HEPATITIS B VIRUS: ICD-10-CM

## 2023-11-01 DIAGNOSIS — Z79.4 TYPE 2 DIABETES MELLITUS WITH STAGE 3A CHRONIC KIDNEY DISEASE, WITH LONG-TERM CURRENT USE OF INSULIN (H): ICD-10-CM

## 2023-11-01 DIAGNOSIS — N18.31 TYPE 2 DIABETES MELLITUS WITH STAGE 3A CHRONIC KIDNEY DISEASE, WITH LONG-TERM CURRENT USE OF INSULIN (H): ICD-10-CM

## 2023-11-01 DIAGNOSIS — E11.22 TYPE 2 DIABETES MELLITUS WITH STAGE 3A CHRONIC KIDNEY DISEASE, WITH LONG-TERM CURRENT USE OF INSULIN (H): ICD-10-CM

## 2023-11-01 LAB
ANION GAP SERPL CALCULATED.3IONS-SCNC: 11 MMOL/L (ref 7–15)
BUN SERPL-MCNC: 29.5 MG/DL (ref 8–23)
CALCIUM SERPL-MCNC: 9.3 MG/DL (ref 8.8–10.2)
CHLORIDE SERPL-SCNC: 108 MMOL/L (ref 98–107)
CREAT SERPL-MCNC: 1.59 MG/DL (ref 0.67–1.17)
DEPRECATED HCO3 PLAS-SCNC: 25 MMOL/L (ref 22–29)
EGFRCR SERPLBLD CKD-EPI 2021: 43 ML/MIN/1.73M2
ERYTHROCYTE [DISTWIDTH] IN BLOOD BY AUTOMATED COUNT: 13.2 % (ref 10–15)
GLUCOSE SERPL-MCNC: 73 MG/DL (ref 70–99)
HBA1C MFR BLD: 8.1 % (ref 0–5.6)
HCT VFR BLD AUTO: 33.1 % (ref 40–53)
HGB BLD-MCNC: 10.8 G/DL (ref 13.3–17.7)
MCH RBC QN AUTO: 30.7 PG (ref 26.5–33)
MCHC RBC AUTO-ENTMCNC: 32.6 G/DL (ref 31.5–36.5)
MCV RBC AUTO: 94 FL (ref 78–100)
PLATELET # BLD AUTO: 320 10E3/UL (ref 150–450)
POTASSIUM SERPL-SCNC: 4.7 MMOL/L (ref 3.4–5.3)
RBC # BLD AUTO: 3.52 10E6/UL (ref 4.4–5.9)
SODIUM SERPL-SCNC: 144 MMOL/L (ref 135–145)
WBC # BLD AUTO: 6.4 10E3/UL (ref 4–11)

## 2023-11-01 PROCEDURE — 83036 HEMOGLOBIN GLYCOSYLATED A1C: CPT

## 2023-11-01 PROCEDURE — 80048 BASIC METABOLIC PNL TOTAL CA: CPT

## 2023-11-01 PROCEDURE — 36415 COLL VENOUS BLD VENIPUNCTURE: CPT

## 2023-11-01 PROCEDURE — 86706 HEP B SURFACE ANTIBODY: CPT

## 2023-11-01 PROCEDURE — 85027 COMPLETE CBC AUTOMATED: CPT

## 2023-11-02 ENCOUNTER — OFFICE VISIT (OUTPATIENT)
Dept: FAMILY MEDICINE | Facility: CLINIC | Age: 83
End: 2023-11-02
Payer: COMMERCIAL

## 2023-11-02 VITALS
OXYGEN SATURATION: 97 % | WEIGHT: 226 LBS | TEMPERATURE: 98.2 F | DIASTOLIC BLOOD PRESSURE: 77 MMHG | BODY MASS INDEX: 31.52 KG/M2 | HEART RATE: 70 BPM | RESPIRATION RATE: 16 BRPM | SYSTOLIC BLOOD PRESSURE: 143 MMHG

## 2023-11-02 DIAGNOSIS — I10 ESSENTIAL HYPERTENSION: ICD-10-CM

## 2023-11-02 DIAGNOSIS — E11.22 TYPE 2 DIABETES MELLITUS WITH STAGE 3A CHRONIC KIDNEY DISEASE, WITH LONG-TERM CURRENT USE OF INSULIN (H): Primary | ICD-10-CM

## 2023-11-02 DIAGNOSIS — N18.31 TYPE 2 DIABETES MELLITUS WITH STAGE 3A CHRONIC KIDNEY DISEASE, WITH LONG-TERM CURRENT USE OF INSULIN (H): Primary | ICD-10-CM

## 2023-11-02 DIAGNOSIS — N18.32 STAGE 3B CHRONIC KIDNEY DISEASE (H): ICD-10-CM

## 2023-11-02 DIAGNOSIS — I12.9 RENAL HYPERTENSION: ICD-10-CM

## 2023-11-02 DIAGNOSIS — R80.1 PERSISTENT PROTEINURIA: ICD-10-CM

## 2023-11-02 DIAGNOSIS — Z23 NEED FOR PROPHYLACTIC VACCINATION AGAINST HEPATITIS B VIRUS: ICD-10-CM

## 2023-11-02 DIAGNOSIS — E83.42 HYPOMAGNESEMIA: ICD-10-CM

## 2023-11-02 DIAGNOSIS — Z79.4 TYPE 2 DIABETES MELLITUS WITH STAGE 3A CHRONIC KIDNEY DISEASE, WITH LONG-TERM CURRENT USE OF INSULIN (H): Primary | ICD-10-CM

## 2023-11-02 LAB
HBV SURFACE AB SERPL IA-ACNC: 0.43 M[IU]/ML
HBV SURFACE AB SERPL IA-ACNC: NONREACTIVE M[IU]/ML

## 2023-11-02 PROCEDURE — 99214 OFFICE O/P EST MOD 30 MIN: CPT | Mod: 25 | Performed by: FAMILY MEDICINE

## 2023-11-02 PROCEDURE — 90480 ADMN SARSCOV2 VAC 1/ONLY CMP: CPT | Performed by: FAMILY MEDICINE

## 2023-11-02 PROCEDURE — G0008 ADMIN INFLUENZA VIRUS VAC: HCPCS | Performed by: FAMILY MEDICINE

## 2023-11-02 PROCEDURE — 90662 IIV NO PRSV INCREASED AG IM: CPT | Performed by: FAMILY MEDICINE

## 2023-11-02 PROCEDURE — 91320 SARSCV2 VAC 30MCG TRS-SUC IM: CPT | Performed by: FAMILY MEDICINE

## 2023-11-02 RX ORDER — FERROUS SULFATE 325(65) MG
325 TABLET ORAL
Qty: 36 TABLET | Refills: 3 | Status: SHIPPED | OUTPATIENT
Start: 2023-11-02 | End: 2024-05-16

## 2023-11-02 RX ORDER — MULTIVITAMIN WITH IRON
1 TABLET ORAL AT BEDTIME
Qty: 90 TABLET | Refills: 1 | Status: SHIPPED | OUTPATIENT
Start: 2023-11-02 | End: 2024-05-16

## 2023-11-02 RX ORDER — AMLODIPINE BESYLATE 10 MG/1
10 TABLET ORAL DAILY
Qty: 90 TABLET | Refills: 1 | Status: SHIPPED | OUTPATIENT
Start: 2023-11-02 | End: 2024-05-16

## 2023-11-02 RX ORDER — ATORVASTATIN CALCIUM 40 MG/1
40 TABLET, FILM COATED ORAL DAILY
Qty: 90 TABLET | Refills: 1 | Status: SHIPPED | OUTPATIENT
Start: 2023-11-02 | End: 2024-05-16

## 2023-11-02 RX ORDER — LISINOPRIL 40 MG/1
40 TABLET ORAL DAILY
Qty: 90 TABLET | Refills: 1 | Status: SHIPPED | OUTPATIENT
Start: 2023-11-02 | End: 2024-05-16

## 2023-11-02 RX ORDER — METOPROLOL SUCCINATE 100 MG/1
TABLET, EXTENDED RELEASE ORAL
Qty: 270 TABLET | Refills: 1 | Status: SHIPPED | OUTPATIENT
Start: 2023-11-02 | End: 2024-05-16

## 2023-11-02 RX ORDER — RESPIRATORY SYNCYTIAL VIRUS VACCINE 120MCG/0.5
0.5 KIT INTRAMUSCULAR ONCE
Qty: 1 EACH | Refills: 0 | Status: CANCELLED | OUTPATIENT
Start: 2023-11-02 | End: 2023-11-02

## 2023-11-02 NOTE — PATIENT INSTRUCTIONS
Consider starting a medication like Brenzavvy that is now available online for approximately $145 for a 3 month supply.      Available only from [email or web address]    Step 1: Make account with [facility] to set up address and payment   Step 2: Request prescription from Dr. Lovett  Step 3: Medication is mailed to you

## 2023-11-03 NOTE — PROGRESS NOTES
Senthil was seen today for results.    Diagnoses and all orders for this visit:    Type 2 diabetes mellitus with stage 3a chronic kidney disease, with long-term current use of insulin (H)  -     atorvastatin (LIPITOR) 40 MG tablet; Take 1 tablet (40 mg) by mouth daily  -     blood glucose (ONETOUCH ULTRA) test strip; Use up to 3 times daily  -     lisinopril (ZESTRIL) 40 MG tablet; Take 1 tablet (40 mg) by mouth daily  -     metFORMIN (GLUCOPHAGE) 500 MG tablet; Take 1 tablet (500 mg) by mouth 2 times daily (with meals)    Need for prophylactic vaccination against hepatitis B virus  -     Hepatitis B Surface Antibody; Future    Essential hypertension  -     amLODIPine (NORVASC) 10 MG tablet; Take 1 tablet (10 mg) by mouth daily  -     lisinopril (ZESTRIL) 40 MG tablet; Take 1 tablet (40 mg) by mouth daily    Stage 3b chronic kidney disease (H)  -     ferrous sulfate (FEROSUL) 325 (65 Fe) MG tablet; Take 1 tablet (325 mg) by mouth three times a week    Persistent proteinuria  -     lisinopril (ZESTRIL) 40 MG tablet; Take 1 tablet (40 mg) by mouth daily    Hypomagnesemia  -     magnesium 250 MG tablet; Take 1 tablet (250 mg) by mouth at bedtime    Renal hypertension  -     metoprolol succinate ER (TOPROL XL) 100 MG 24 hr tablet; TAKE 2 TABLETS BY MOUTH IN THE MORNING AND 1 TABLET AT NIGHT.    Other orders  -     INFLUENZA VACCINE 65+ (FLUZONE HD)  -     COVID-19 12+ (2023-24) (PFIZER)      Medications refilled, no changes made.  Immunizations given.    Patient Instructions   Consider starting a medication like Brenzavvy that is now available online for approximately $145 for a 3 month supply.      Available only from [email or web address]    Step 1: Make account with [facility] to set up address and payment   Step 2: Request prescription from Dr. Lovett  Step 3: Medication is mailed to you     Total visit time with patient was 25 mins, all of which was face to face MD time, and over 50% of this time was spent  in counseling and coordination of care.  Including post-encounter documentation and orders, total encounter time was 32 mins.            Subjective:  This is an 83-year-old who is well-known to me who attends for his usual routine follow-up for his diabetes.  He feels well and is happy to continue with his current treatments.  He checks his blood sugars with fingersticks regularly each day and reports that for the most part they are satisfactory.  He does get occasional hypoglycemic episodes and manages these with taking glucose.  He has been very reluctant to change and has resisted using a CGM or switching to more contemporary treatments.  In addition his insurance has limited his options.  He had been prescribed empagliflozin in the past and this was unaffordable to him.    He has no particular concerns today.      ROS:  Neuro: He continues to have a tremor and does not think this has worsened.  He has discontinued taking previously prescribed medications due to side effects and does not want to explore taking any new treatments for this.  Renal: He has seen nephrology in the past but does not particularly want to closely follow with them and prefers to wait and see if his kidney function is maintained with visits at this clinic.    Objective:  BP (!) 143/77   Pulse 70   Temp 98.2  F (36.8  C) (Oral)   Resp 16   Wt 102.5 kg (226 lb)   SpO2 97%   BMI 31.52 kg/m    His systolic BP is mildly elevated.  This has been a recurring finding in clinic and he says that his BPs at home are much better.  He does have a resting tremor which does appear to improve with intention.  This has been previously diagnosed by neurology as essential.  Further physical exam was not performed today and instead we spent our time reviewing his medications and reviewing the labs obtained 1 day prior to today's visit.    I spent 15 minutes reviewing his medications, reconciling his list and refilling these.  We additionally discussed  the availability of a new SGLT 2 inhibitor which he could purchase much less expensively online.    Lab Results   Component Value Date    A1C 8.1 11/01/2023    A1C 8.6 05/18/2023    A1C 8.5 02/13/2023    A1C 8.1 07/01/2022    A1C 8.5 02/09/2022    A1C 8.5 06/03/2021    A1C 8.7 01/13/2021    A1C 8.6 06/15/2020    A1C 8.7 11/07/2019    A1C 9.0 05/20/2019     Last Comprehensive Metabolic Panel:  Lab Results   Component Value Date     11/01/2023    POTASSIUM 4.7 11/01/2023    CHLORIDE 108 (H) 11/01/2023    CO2 25 11/01/2023    ANIONGAP 11 11/01/2023    GLC 73 11/01/2023    BUN 29.5 (H) 11/01/2023    CR 1.59 (H) 11/01/2023    GFRESTIMATED 43 (L) 11/01/2023    DALJIT 9.3 11/01/2023     Lab Results   Component Value Date    WBC 6.4 11/01/2023     Lab Results   Component Value Date    RBC 3.52 11/01/2023     Lab Results   Component Value Date    HGB 10.8 11/01/2023    HGB 11.9 01/13/2021     Lab Results   Component Value Date    HCT 33.1 11/01/2023    HCT 40.5 01/13/2021     Lab Results   Component Value Date    MCV 94 11/01/2023    .1 01/13/2021     Lab Results   Component Value Date    MCH 30.7 11/01/2023    MCH 29.7 01/13/2021     Lab Results   Component Value Date    MCHC 32.6 11/01/2023    MCHC 29.4 01/13/2021     Lab Results   Component Value Date    RDW 13.2 11/01/2023    RDW 12.8 01/19/2017     Lab Results   Component Value Date     11/01/2023

## 2024-04-06 ENCOUNTER — HEALTH MAINTENANCE LETTER (OUTPATIENT)
Age: 84
End: 2024-04-06

## 2024-05-12 DIAGNOSIS — N18.31 TYPE 2 DIABETES MELLITUS WITH STAGE 3A CHRONIC KIDNEY DISEASE, WITH LONG-TERM CURRENT USE OF INSULIN (H): ICD-10-CM

## 2024-05-12 DIAGNOSIS — I10 ESSENTIAL HYPERTENSION: ICD-10-CM

## 2024-05-12 DIAGNOSIS — Z79.4 TYPE 2 DIABETES MELLITUS WITH STAGE 3A CHRONIC KIDNEY DISEASE, WITH LONG-TERM CURRENT USE OF INSULIN (H): ICD-10-CM

## 2024-05-12 DIAGNOSIS — R80.1 PERSISTENT PROTEINURIA: ICD-10-CM

## 2024-05-12 DIAGNOSIS — E11.22 TYPE 2 DIABETES MELLITUS WITH STAGE 3A CHRONIC KIDNEY DISEASE, WITH LONG-TERM CURRENT USE OF INSULIN (H): ICD-10-CM

## 2024-05-14 ENCOUNTER — MYC MEDICAL ADVICE (OUTPATIENT)
Dept: FAMILY MEDICINE | Facility: CLINIC | Age: 84
End: 2024-05-14
Payer: COMMERCIAL

## 2024-05-14 DIAGNOSIS — N18.32 STAGE 3B CHRONIC KIDNEY DISEASE (H): ICD-10-CM

## 2024-05-14 DIAGNOSIS — E11.22 TYPE 2 DIABETES MELLITUS WITH STAGE 3A CHRONIC KIDNEY DISEASE, WITH LONG-TERM CURRENT USE OF INSULIN (H): ICD-10-CM

## 2024-05-14 DIAGNOSIS — N18.31 TYPE 2 DIABETES MELLITUS WITH STAGE 3A CHRONIC KIDNEY DISEASE, WITH LONG-TERM CURRENT USE OF INSULIN (H): ICD-10-CM

## 2024-05-14 DIAGNOSIS — Z79.4 TYPE 2 DIABETES MELLITUS WITH STAGE 3A CHRONIC KIDNEY DISEASE, WITH LONG-TERM CURRENT USE OF INSULIN (H): ICD-10-CM

## 2024-05-15 ENCOUNTER — LAB (OUTPATIENT)
Dept: LAB | Facility: CLINIC | Age: 84
End: 2024-05-15
Payer: COMMERCIAL

## 2024-05-15 DIAGNOSIS — E11.22 TYPE 2 DIABETES MELLITUS WITH STAGE 3A CHRONIC KIDNEY DISEASE, WITH LONG-TERM CURRENT USE OF INSULIN (H): ICD-10-CM

## 2024-05-15 DIAGNOSIS — N18.31 TYPE 2 DIABETES MELLITUS WITH STAGE 3A CHRONIC KIDNEY DISEASE, WITH LONG-TERM CURRENT USE OF INSULIN (H): ICD-10-CM

## 2024-05-15 DIAGNOSIS — Z79.4 TYPE 2 DIABETES MELLITUS WITH STAGE 3A CHRONIC KIDNEY DISEASE, WITH LONG-TERM CURRENT USE OF INSULIN (H): ICD-10-CM

## 2024-05-15 LAB
BASOPHILS # BLD AUTO: 0 10E3/UL (ref 0–0.2)
BASOPHILS NFR BLD AUTO: 0 %
CREAT UR-MCNC: 130 MG/DL
EOSINOPHIL # BLD AUTO: 0.1 10E3/UL (ref 0–0.7)
EOSINOPHIL NFR BLD AUTO: 2 %
ERYTHROCYTE [DISTWIDTH] IN BLOOD BY AUTOMATED COUNT: 13.4 % (ref 10–15)
HBA1C MFR BLD: 7.9 % (ref 0–5.6)
HCT VFR BLD AUTO: 32.7 % (ref 40–53)
HGB BLD-MCNC: 10.6 G/DL (ref 13.3–17.7)
IMM GRANULOCYTES # BLD: 0 10E3/UL
IMM GRANULOCYTES NFR BLD: 0 %
LYMPHOCYTES # BLD AUTO: 1.4 10E3/UL (ref 0.8–5.3)
LYMPHOCYTES NFR BLD AUTO: 26 %
MCH RBC QN AUTO: 30 PG (ref 26.5–33)
MCHC RBC AUTO-ENTMCNC: 32.4 G/DL (ref 31.5–36.5)
MCV RBC AUTO: 93 FL (ref 78–100)
MICROALBUMIN UR-MCNC: 933 MG/L
MICROALBUMIN/CREAT UR: 717.69 MG/G CR (ref 0–17)
MONOCYTES # BLD AUTO: 0.4 10E3/UL (ref 0–1.3)
MONOCYTES NFR BLD AUTO: 7 %
NEUTROPHILS # BLD AUTO: 3.6 10E3/UL (ref 1.6–8.3)
NEUTROPHILS NFR BLD AUTO: 64 %
PLATELET # BLD AUTO: 287 10E3/UL (ref 150–450)
RBC # BLD AUTO: 3.53 10E6/UL (ref 4.4–5.9)
WBC # BLD AUTO: 5.7 10E3/UL (ref 4–11)

## 2024-05-15 PROCEDURE — 85025 COMPLETE CBC W/AUTO DIFF WBC: CPT | Performed by: FAMILY MEDICINE

## 2024-05-15 PROCEDURE — 80061 LIPID PANEL: CPT

## 2024-05-15 PROCEDURE — 83036 HEMOGLOBIN GLYCOSYLATED A1C: CPT | Performed by: FAMILY MEDICINE

## 2024-05-15 PROCEDURE — 36415 COLL VENOUS BLD VENIPUNCTURE: CPT | Performed by: FAMILY MEDICINE

## 2024-05-15 PROCEDURE — 82570 ASSAY OF URINE CREATININE: CPT | Performed by: FAMILY MEDICINE

## 2024-05-15 PROCEDURE — 80048 BASIC METABOLIC PNL TOTAL CA: CPT | Performed by: FAMILY MEDICINE

## 2024-05-15 PROCEDURE — 82043 UR ALBUMIN QUANTITATIVE: CPT | Performed by: FAMILY MEDICINE

## 2024-05-15 NOTE — LETTER
May 21, 2024      Senthil PICKARD Anna  [address]  [place] [address]        Dear ,    We are writing to inform you of your test results.    LDL bad cholesterol is low - great result!     Resulted Orders   Lipid Profile   Result Value Ref Range    Cholesterol 133 <200 mg/dL    Triglycerides 116 <150 mg/dL    Direct Measure HDL 51 >=40 mg/dL    LDL Cholesterol Calculated 59 <=100 mg/dL    Non HDL Cholesterol 82 <130 mg/dL    Patient Fasting > 8hrs? Unknown     Narrative    Cholesterol  Desirable:  <200 mg/dL    Triglycerides  Normal:  Less than 150 mg/dL  Borderline High:  150-199 mg/dL  High:  200-499 mg/dL  Very High:  Greater than or equal to 500 mg/dL    Direct Measure HDL  Female:  Greater than or equal to 50 mg/dL   Male:  Greater than or equal to 40 mg/dL    LDL Cholesterol  Desirable:  <100mg/dL  Above Desirable:  100-129 mg/dL   Borderline High:  130-159 mg/dL   High:  160-189 mg/dL   Very High:  >= 190 mg/dL    Non HDL Cholesterol  Desirable:  130 mg/dL  Above Desirable:  130-159 mg/dL  Borderline High:  160-189 mg/dL  High:  190-219 mg/dL  Very High:  Greater than or equal to 220 mg/dL       If you have any questions or concerns, please call the clinic at the number listed above.       Sincerely,      Mo Lovett MD

## 2024-05-16 ENCOUNTER — OFFICE VISIT (OUTPATIENT)
Dept: FAMILY MEDICINE | Facility: CLINIC | Age: 84
End: 2024-05-16
Payer: COMMERCIAL

## 2024-05-16 VITALS
SYSTOLIC BLOOD PRESSURE: 151 MMHG | HEART RATE: 67 BPM | WEIGHT: 225.6 LBS | DIASTOLIC BLOOD PRESSURE: 78 MMHG | BODY MASS INDEX: 31.58 KG/M2 | TEMPERATURE: 97.9 F | RESPIRATION RATE: 12 BRPM | OXYGEN SATURATION: 97 % | HEIGHT: 71 IN

## 2024-05-16 DIAGNOSIS — N18.31 TYPE 2 DIABETES MELLITUS WITH STAGE 3A CHRONIC KIDNEY DISEASE, WITH LONG-TERM CURRENT USE OF INSULIN (H): ICD-10-CM

## 2024-05-16 DIAGNOSIS — E83.42 HYPOMAGNESEMIA: ICD-10-CM

## 2024-05-16 DIAGNOSIS — I10 ESSENTIAL HYPERTENSION: ICD-10-CM

## 2024-05-16 DIAGNOSIS — I12.9 RENAL HYPERTENSION: ICD-10-CM

## 2024-05-16 DIAGNOSIS — R80.1 PERSISTENT PROTEINURIA: ICD-10-CM

## 2024-05-16 DIAGNOSIS — Z29.11 NEED FOR VACCINATION AGAINST RESPIRATORY SYNCYTIAL VIRUS: ICD-10-CM

## 2024-05-16 DIAGNOSIS — N18.32 STAGE 3B CHRONIC KIDNEY DISEASE (H): ICD-10-CM

## 2024-05-16 DIAGNOSIS — E11.22 TYPE 2 DIABETES MELLITUS WITH STAGE 3A CHRONIC KIDNEY DISEASE, WITH LONG-TERM CURRENT USE OF INSULIN (H): ICD-10-CM

## 2024-05-16 DIAGNOSIS — Z23 NEED FOR TDAP VACCINATION: ICD-10-CM

## 2024-05-16 DIAGNOSIS — Z79.4 TYPE 2 DIABETES MELLITUS WITH STAGE 3A CHRONIC KIDNEY DISEASE, WITH LONG-TERM CURRENT USE OF INSULIN (H): ICD-10-CM

## 2024-05-16 LAB
ANION GAP SERPL CALCULATED.3IONS-SCNC: 10 MMOL/L (ref 7–15)
BUN SERPL-MCNC: 26 MG/DL (ref 8–23)
CALCIUM SERPL-MCNC: 9.2 MG/DL (ref 8.8–10.2)
CHLORIDE SERPL-SCNC: 105 MMOL/L (ref 98–107)
CHOLEST SERPL-MCNC: 133 MG/DL
CREAT SERPL-MCNC: 1.68 MG/DL (ref 0.67–1.17)
DEPRECATED HCO3 PLAS-SCNC: 25 MMOL/L (ref 22–29)
EGFRCR SERPLBLD CKD-EPI 2021: 41 ML/MIN/1.73M2
FASTING STATUS PATIENT QL REPORTED: ABNORMAL
FASTING STATUS PATIENT QL REPORTED: NORMAL
GLUCOSE SERPL-MCNC: 280 MG/DL (ref 70–99)
HDLC SERPL-MCNC: 51 MG/DL
LDLC SERPL CALC-MCNC: 59 MG/DL
NONHDLC SERPL-MCNC: 82 MG/DL
POTASSIUM SERPL-SCNC: 5.2 MMOL/L (ref 3.4–5.3)
SODIUM SERPL-SCNC: 140 MMOL/L (ref 135–145)
TRIGL SERPL-MCNC: 116 MG/DL

## 2024-05-16 PROCEDURE — G2211 COMPLEX E/M VISIT ADD ON: HCPCS | Performed by: FAMILY MEDICINE

## 2024-05-16 PROCEDURE — 99214 OFFICE O/P EST MOD 30 MIN: CPT | Performed by: FAMILY MEDICINE

## 2024-05-16 RX ORDER — METOPROLOL SUCCINATE 100 MG/1
TABLET, EXTENDED RELEASE ORAL
Qty: 270 TABLET | Refills: 1 | Status: SHIPPED | OUTPATIENT
Start: 2024-05-16

## 2024-05-16 RX ORDER — BLOOD SUGAR DIAGNOSTIC
STRIP MISCELLANEOUS
Qty: 300 STRIP | Refills: 1 | OUTPATIENT
Start: 2024-05-16

## 2024-05-16 RX ORDER — LISINOPRIL 40 MG/1
40 TABLET ORAL DAILY
Qty: 90 TABLET | Refills: 1 | Status: SHIPPED | OUTPATIENT
Start: 2024-05-16

## 2024-05-16 RX ORDER — ASPIRIN 325 MG
325 TABLET ORAL EVERY OTHER DAY
Qty: 90 TABLET | Refills: 1 | Status: SHIPPED | OUTPATIENT
Start: 2024-05-16

## 2024-05-16 RX ORDER — FERROUS SULFATE 325(65) MG
325 TABLET ORAL
Qty: 36 TABLET | Refills: 3 | Status: SHIPPED | OUTPATIENT
Start: 2024-05-16

## 2024-05-16 RX ORDER — MULTIVITAMIN WITH IRON
1 TABLET ORAL AT BEDTIME
Qty: 90 TABLET | Refills: 1 | Status: SHIPPED | OUTPATIENT
Start: 2024-05-16

## 2024-05-16 RX ORDER — AMLODIPINE BESYLATE 10 MG/1
10 TABLET ORAL DAILY
Qty: 90 TABLET | Refills: 1 | Status: SHIPPED | OUTPATIENT
Start: 2024-05-16

## 2024-05-16 RX ORDER — ATORVASTATIN CALCIUM 40 MG/1
40 TABLET, FILM COATED ORAL DAILY
Qty: 90 TABLET | Refills: 1 | Status: SHIPPED | OUTPATIENT
Start: 2024-05-16

## 2024-05-16 RX ORDER — RESPIRATORY SYNCYTIAL VIRUS VACCINE 120MCG/0.5
0.5 KIT INTRAMUSCULAR ONCE
Qty: 1 EACH | Refills: 0 | Status: CANCELLED | OUTPATIENT
Start: 2024-05-16 | End: 2024-05-16

## 2024-05-16 RX ORDER — AMLODIPINE BESYLATE 10 MG/1
10 TABLET ORAL DAILY
Qty: 90 TABLET | Refills: 1 | OUTPATIENT
Start: 2024-05-16

## 2024-05-16 RX ORDER — LISINOPRIL 40 MG/1
40 TABLET ORAL DAILY
Qty: 90 TABLET | Refills: 1 | OUTPATIENT
Start: 2024-05-16

## 2024-05-16 RX ORDER — ATORVASTATIN CALCIUM 40 MG/1
40 TABLET, FILM COATED ORAL DAILY
Qty: 90 TABLET | Refills: 1 | OUTPATIENT
Start: 2024-05-16

## 2024-05-16 NOTE — PROGRESS NOTES
ASSESSMENT/PLAN:  Senthil was seen today for diabetes, hypertension, back pain, refill request and medication reconciliation.    Diagnoses and all orders for this visit:    Need for Tdap vaccination  -     Tdap, tetanus-diptheria-acell pertussis, (BOOSTRIX) 5-2.5-18.5 LF-MCG/0.5 GE injection; Inject 0.5 mLs into the muscle once for 1 dose    Need for vaccination against respiratory syncytial virus    Essential hypertension  -     amLODIPine (NORVASC) 10 MG tablet; Take 1 tablet (10 mg) by mouth daily  -     lisinopril (ZESTRIL) 40 MG tablet; Take 1 tablet (40 mg) by mouth daily    Type 2 diabetes mellitus with stage 3a chronic kidney disease, with long-term current use of insulin (H)  -     aspirin (ASA) 325 MG tablet; Take 1 tablet (325 mg) by mouth every other day  -     atorvastatin (LIPITOR) 40 MG tablet; Take 1 tablet (40 mg) by mouth daily  -     lisinopril (ZESTRIL) 40 MG tablet; Take 1 tablet (40 mg) by mouth daily  -     metFORMIN (GLUCOPHAGE) 500 MG tablet; Take 1 tablet (500 mg) by mouth 2 times daily (with meals)    Stage 3b chronic kidney disease (H)  -     ferrous sulfate (FEROSUL) 325 (65 Fe) MG tablet; Take 1 tablet (325 mg) by mouth three times a week    Persistent proteinuria  -     lisinopril (ZESTRIL) 40 MG tablet; Take 1 tablet (40 mg) by mouth daily    Hypomagnesemia  -     magnesium 250 MG tablet; Take 1 tablet (250 mg) by mouth at bedtime    Renal hypertension  -     metoprolol succinate ER (TOPROL XL) 100 MG 24 hr tablet; TAKE 2 TABLETS BY MOUTH IN THE MORNING AND 1 TABLET AT NIGHT.      We reviewed labs obtained the day before visit.  His A1c is actually slightly improved.  BMP is not yet available and we shall follow-up with this result.  He continues to have marissa proteinuria.  He should follow with nephrology and we we will discuss this.    Given his elevated BP, I suggested adding chlorthalidone but he declines.  He agrees to continue taking his current medications and I refilled  "these.  His hemoglobin is mildly reduced and he is okay to take oral iron.    Return in about 6 months (around 11/16/2024), or if symptoms worsen or fail to improve.    The longitudinal plan of care for the diagnosis(es)/condition(s) as documented were addressed during this visit. Due to the added complexity in care, I will continue to support Senthil in the subsequent management and with ongoing continuity of care.      Total visit time with patient was 25 mins, all of which was face to face MD time, and over 50% of this time was spent in counseling and coordination of care.  Including post-encounter documentation and orders on the date of service, total encounter time was 32 mins.    Rashi Ndiaye is a 83 year old, presenting for the following health issues:  Diabetes, Hypertension, Back Pain, Refill Request (Like Rx med refill ), and Medication Reconciliation (Med reviewed, inquiring provider's attention, med lists needs attention )     This is an 83-year-old well-known to me.  This is a routine 6 monthly follow-up for his diabetes, chronic kidney disease and hypertension.  He has used the same medications for many years and is generally resistant to change.  He is also less interested in starting any new medications, even if these are indicated.  His wife attends with him and is concerned that at times he seems more fatigued than usual.  Review of Systems   Neuro: He is aware of some numbness and tingling in his feet and believes he probably has neuropathy but it does not affect his activities for the most part.  He has longstanding tremor of both hands and does not want any further evaluation or treatment for this.        Objective    Physical Exam   BP (!) 151/78 (BP Location: Left arm, Patient Position: Sitting, Cuff Size: Adult Large)   Pulse 67   Temp 97.9  F (36.6  C) (Oral)   Resp 12   Ht 1.803 m (5' 11\")   Wt 102.3 kg (225 lb 9.6 oz)   SpO2 97%   BMI 31.46 kg/m       Vitals stable BP is " elevated as it usually is in clinic.  He reports readings in the 130s over 70s to 80s at home and when checked at [facility].  Well nourished and in no distress  Neck supple without lymphadenopathy, no goiter  Heart sounds normal without murmur    Lungs clear to auscultation, no wheezes/rales/rhonchi, good air entry   Abdomen soft, nontender, no masses, no rebound, bowel sounds throughout  No lower extremity edema   Microfilament testing of his feet is negative for neuropathy and he has good distal pulses and no signs of calluses or corns.    Hemoglobin A1C   Date Value Ref Range Status   05/15/2024 7.9 (H) 0.0 - 5.6 % Final     Comment:     Normal <5.7%   Prediabetes 5.7-6.4%    Diabetes 6.5% or higher     Note: Adopted from ADA consensus guidelines.   11/01/2023 8.1 (H) 0.0 - 5.6 % Final     Comment:     Normal <5.7%   Prediabetes 5.7-6.4%    Diabetes 6.5% or higher     Note: Adopted from ADA consensus guidelines.   05/18/2023 8.6 (H) 0.0 - 5.6 % Final     Comment:     Normal <5.7%   Prediabetes 5.7-6.4%    Diabetes 6.5% or higher     Note: Adopted from ADA consensus guidelines.   06/03/2021 8.5 (H) 4.1 - 5.7 % Final   01/13/2021 8.7 (H) 4.1 - 5.7 % Final   06/15/2020 8.6 (H) 4.1 - 5.7 % Final

## 2024-05-17 NOTE — RESULT ENCOUNTER NOTE
Sheryl - your kidney function is about the same as it has been over the past year - you are in chronic kidney disease stage 3b with proteinuria.  So you could be seeing a kidney specialist regularly.  Let me know if  you would like me to re-connect  you.  They may  not do much more than we are doing - they do recommend some of the newer diabetes medications since one kind in particular is good for kidneys.  Actually there is a way to get it for $45 per month via a mail order for people like you whose insurance does not cover it.  Let me know if you're interested in that.  Otherwise let's plan to recheck in about 6 months again - take care, Dr Mo Lovett

## 2024-08-24 ENCOUNTER — HEALTH MAINTENANCE LETTER (OUTPATIENT)
Age: 84
End: 2024-08-24

## 2024-09-26 ENCOUNTER — TELEPHONE (OUTPATIENT)
Dept: FAMILY MEDICINE | Facility: CLINIC | Age: 84
End: 2024-09-26

## 2024-09-26 DIAGNOSIS — E83.42 HYPOMAGNESEMIA: ICD-10-CM

## 2024-09-26 DIAGNOSIS — E11.22 TYPE 2 DIABETES MELLITUS WITH STAGE 3A CHRONIC KIDNEY DISEASE, WITH LONG-TERM CURRENT USE OF INSULIN (H): Primary | ICD-10-CM

## 2024-09-26 DIAGNOSIS — N18.31 TYPE 2 DIABETES MELLITUS WITH STAGE 3A CHRONIC KIDNEY DISEASE, WITH LONG-TERM CURRENT USE OF INSULIN (H): Primary | ICD-10-CM

## 2024-09-26 DIAGNOSIS — Z79.4 TYPE 2 DIABETES MELLITUS WITH STAGE 3A CHRONIC KIDNEY DISEASE, WITH LONG-TERM CURRENT USE OF INSULIN (H): Primary | ICD-10-CM

## 2024-09-26 NOTE — TELEPHONE ENCOUNTER
General Call      Reason for Call:  PATIENT IS COMING IN FOR A DM CK ON 10/10 AND WOULD LIKE TO DO LAB WORK THE DAY BEFORE.  CAN YOU PLEASE PUT ORDERS IN THE CHART.    Date of last appointment with provider: 05/16/24    Could we send this information to you in [employer] or would you prefer to receive a phone call?:   Patient would prefer a phone call   Okay to leave a detailed message?: NO NEED TO CALL BACK UNLESS ORDERS ARE NOT PLACED          Ashley Jaimes on 9/26/2024 at 4:16 PM

## 2024-10-09 ENCOUNTER — LAB (OUTPATIENT)
Dept: LAB | Facility: CLINIC | Age: 84
End: 2024-10-09
Payer: COMMERCIAL

## 2024-10-09 DIAGNOSIS — Z79.4 TYPE 2 DIABETES MELLITUS WITH STAGE 3A CHRONIC KIDNEY DISEASE, WITH LONG-TERM CURRENT USE OF INSULIN (H): ICD-10-CM

## 2024-10-09 DIAGNOSIS — E11.22 TYPE 2 DIABETES MELLITUS WITH STAGE 3A CHRONIC KIDNEY DISEASE, WITH LONG-TERM CURRENT USE OF INSULIN (H): ICD-10-CM

## 2024-10-09 DIAGNOSIS — E83.42 HYPOMAGNESEMIA: ICD-10-CM

## 2024-10-09 DIAGNOSIS — N18.31 TYPE 2 DIABETES MELLITUS WITH STAGE 3A CHRONIC KIDNEY DISEASE, WITH LONG-TERM CURRENT USE OF INSULIN (H): ICD-10-CM

## 2024-10-09 LAB
ERYTHROCYTE [DISTWIDTH] IN BLOOD BY AUTOMATED COUNT: 12.6 % (ref 10–15)
EST. AVERAGE GLUCOSE BLD GHB EST-MCNC: 186 MG/DL
HBA1C MFR BLD: 8.1 % (ref 0–5.6)
HCT VFR BLD AUTO: 32.6 % (ref 40–53)
HGB BLD-MCNC: 10.7 G/DL (ref 13.3–17.7)
MCH RBC QN AUTO: 30.5 PG (ref 26.5–33)
MCHC RBC AUTO-ENTMCNC: 32.8 G/DL (ref 31.5–36.5)
MCV RBC AUTO: 93 FL (ref 78–100)
PLATELET # BLD AUTO: 264 10E3/UL (ref 150–450)
RBC # BLD AUTO: 3.51 10E6/UL (ref 4.4–5.9)
WBC # BLD AUTO: 7.7 10E3/UL (ref 4–11)

## 2024-10-09 PROCEDURE — 36415 COLL VENOUS BLD VENIPUNCTURE: CPT

## 2024-10-09 PROCEDURE — 83735 ASSAY OF MAGNESIUM: CPT

## 2024-10-09 PROCEDURE — 80048 BASIC METABOLIC PNL TOTAL CA: CPT

## 2024-10-09 PROCEDURE — 85027 COMPLETE CBC AUTOMATED: CPT

## 2024-10-09 PROCEDURE — 83036 HEMOGLOBIN GLYCOSYLATED A1C: CPT

## 2024-10-10 ENCOUNTER — OFFICE VISIT (OUTPATIENT)
Dept: FAMILY MEDICINE | Facility: CLINIC | Age: 84
End: 2024-10-10
Payer: COMMERCIAL

## 2024-10-10 VITALS
WEIGHT: 214.8 LBS | HEART RATE: 68 BPM | HEIGHT: 70 IN | DIASTOLIC BLOOD PRESSURE: 71 MMHG | OXYGEN SATURATION: 97 % | RESPIRATION RATE: 12 BRPM | BODY MASS INDEX: 30.75 KG/M2 | SYSTOLIC BLOOD PRESSURE: 119 MMHG | TEMPERATURE: 98.8 F

## 2024-10-10 DIAGNOSIS — I10 ESSENTIAL HYPERTENSION: ICD-10-CM

## 2024-10-10 DIAGNOSIS — N18.32 STAGE 3B CHRONIC KIDNEY DISEASE (H): ICD-10-CM

## 2024-10-10 DIAGNOSIS — E11.22 TYPE 2 DIABETES MELLITUS WITH STAGE 3B CHRONIC KIDNEY DISEASE, WITH LONG-TERM CURRENT USE OF INSULIN (H): Primary | ICD-10-CM

## 2024-10-10 DIAGNOSIS — Z23 NEED FOR TDAP VACCINATION: ICD-10-CM

## 2024-10-10 DIAGNOSIS — R80.1 PERSISTENT PROTEINURIA: ICD-10-CM

## 2024-10-10 DIAGNOSIS — I12.9 RENAL HYPERTENSION: ICD-10-CM

## 2024-10-10 DIAGNOSIS — E83.42 HYPOMAGNESEMIA: ICD-10-CM

## 2024-10-10 DIAGNOSIS — N18.32 TYPE 2 DIABETES MELLITUS WITH STAGE 3B CHRONIC KIDNEY DISEASE, WITH LONG-TERM CURRENT USE OF INSULIN (H): Primary | ICD-10-CM

## 2024-10-10 DIAGNOSIS — Z79.4 TYPE 2 DIABETES MELLITUS WITH STAGE 3B CHRONIC KIDNEY DISEASE, WITH LONG-TERM CURRENT USE OF INSULIN (H): Primary | ICD-10-CM

## 2024-10-10 DIAGNOSIS — Z00.00 ENCOUNTER FOR MEDICARE ANNUAL WELLNESS EXAM: ICD-10-CM

## 2024-10-10 LAB
ANION GAP SERPL CALCULATED.3IONS-SCNC: 11 MMOL/L (ref 7–15)
BUN SERPL-MCNC: 35.6 MG/DL (ref 8–23)
CALCIUM SERPL-MCNC: 8.9 MG/DL (ref 8.8–10.4)
CHLORIDE SERPL-SCNC: 105 MMOL/L (ref 98–107)
CREAT SERPL-MCNC: 1.8 MG/DL (ref 0.67–1.17)
EGFRCR SERPLBLD CKD-EPI 2021: 37 ML/MIN/1.73M2
GLUCOSE SERPL-MCNC: 267 MG/DL (ref 70–99)
HCO3 SERPL-SCNC: 24 MMOL/L (ref 22–29)
MAGNESIUM SERPL-MCNC: 1.4 MG/DL (ref 1.7–2.3)
POTASSIUM SERPL-SCNC: 4.5 MMOL/L (ref 3.4–5.3)
SODIUM SERPL-SCNC: 140 MMOL/L (ref 135–145)

## 2024-10-10 PROCEDURE — 91320 SARSCV2 VAC 30MCG TRS-SUC IM: CPT | Performed by: FAMILY MEDICINE

## 2024-10-10 PROCEDURE — 90480 ADMN SARSCOV2 VAC 1/ONLY CMP: CPT | Performed by: FAMILY MEDICINE

## 2024-10-10 PROCEDURE — G0008 ADMIN INFLUENZA VIRUS VAC: HCPCS | Performed by: FAMILY MEDICINE

## 2024-10-10 PROCEDURE — 90662 IIV NO PRSV INCREASED AG IM: CPT | Performed by: FAMILY MEDICINE

## 2024-10-10 PROCEDURE — G0439 PPPS, SUBSEQ VISIT: HCPCS | Performed by: FAMILY MEDICINE

## 2024-10-10 RX ORDER — LISINOPRIL 40 MG/1
40 TABLET ORAL DAILY
Qty: 90 TABLET | Refills: 1 | Status: SHIPPED | OUTPATIENT
Start: 2024-10-10

## 2024-10-10 RX ORDER — METOPROLOL SUCCINATE 100 MG/1
TABLET, EXTENDED RELEASE ORAL
Qty: 270 TABLET | Refills: 1 | Status: SHIPPED | OUTPATIENT
Start: 2024-10-10

## 2024-10-10 RX ORDER — ATORVASTATIN CALCIUM 40 MG/1
40 TABLET, FILM COATED ORAL DAILY
Qty: 90 TABLET | Refills: 1 | Status: SHIPPED | OUTPATIENT
Start: 2024-10-10

## 2024-10-10 RX ORDER — FERROUS SULFATE 325(65) MG
325 TABLET ORAL
Qty: 36 TABLET | Refills: 3 | Status: SHIPPED | OUTPATIENT
Start: 2024-10-10

## 2024-10-10 RX ORDER — ASPIRIN 325 MG
325 TABLET ORAL EVERY OTHER DAY
Qty: 90 TABLET | Refills: 1 | Status: SHIPPED | OUTPATIENT
Start: 2024-10-10

## 2024-10-10 RX ORDER — MULTIVITAMIN WITH IRON
1 TABLET ORAL AT BEDTIME
Qty: 90 TABLET | Refills: 1 | Status: SHIPPED | OUTPATIENT
Start: 2024-10-10

## 2024-10-10 RX ORDER — AMLODIPINE BESYLATE 10 MG/1
10 TABLET ORAL DAILY
Qty: 90 TABLET | Refills: 1 | Status: SHIPPED | OUTPATIENT
Start: 2024-10-10

## 2024-10-10 RX ORDER — PEN NEEDLE, DIABETIC 29 G X1/2"
NEEDLE, DISPOSABLE MISCELLANEOUS
Qty: 180 EACH | Refills: 1 | Status: SHIPPED | OUTPATIENT
Start: 2024-10-10

## 2024-10-10 SDOH — HEALTH STABILITY: PHYSICAL HEALTH: ON AVERAGE, HOW MANY DAYS PER WEEK DO YOU ENGAGE IN MODERATE TO STRENUOUS EXERCISE (LIKE A BRISK WALK)?: 3 DAYS

## 2024-10-10 SDOH — HEALTH STABILITY: PHYSICAL HEALTH: ON AVERAGE, HOW MANY MINUTES DO YOU ENGAGE IN EXERCISE AT THIS LEVEL?: 60 MIN

## 2024-10-10 ASSESSMENT — SOCIAL DETERMINANTS OF HEALTH (SDOH): HOW OFTEN DO YOU GET TOGETHER WITH FRIENDS OR RELATIVES?: MORE THAN THREE TIMES A WEEK

## 2024-10-10 NOTE — PROGRESS NOTES
Preventive Care Visit  [facility]  Mo Lovett MD, Family Medicine  Oct 10, 2024      Diagnoses and associated orders for this visit:  Type 2 diabetes mellitus with stage 3b chronic kidney disease, with long-term current use of insulin (H)  -     aspirin (ASA) 325 MG tablet; Take 1 tablet (325 mg) by mouth every other day.  -     atorvastatin (LIPITOR) 40 MG tablet; Take 1 tablet (40 mg) by mouth daily.  -     blood glucose (ONETOUCH ULTRA) test strip; Use up to 3 times daily  -     insulin pen needle (BD ULTRA-FINE) 29G X 12.7MM miscellaneous; Use twice daily  -     lisinopril (ZESTRIL) 40 MG tablet; Take 1 tablet (40 mg) by mouth daily.  -     metFORMIN (GLUCOPHAGE) 500 MG tablet; Take 1 tablet (500 mg) by mouth 2 times daily (with meals).    Need for Tdap vaccination    Essential hypertension  -     amLODIPine (NORVASC) 10 MG tablet; Take 1 tablet (10 mg) by mouth daily.  -     lisinopril (ZESTRIL) 40 MG tablet; Take 1 tablet (40 mg) by mouth daily.    Stage 3b chronic kidney disease (H)  -     ferrous sulfate (FEROSUL) 325 (65 Fe) MG tablet; Take 1 tablet (325 mg) by mouth three times a week.    Persistent proteinuria  -     lisinopril (ZESTRIL) 40 MG tablet; Take 1 tablet (40 mg) by mouth daily.    Hypomagnesemia  -     magnesium 250 MG tablet; Take 1 tablet (250 mg) by mouth at bedtime.    Renal hypertension  -     metoprolol succinate ER (TOPROL XL) 100 MG 24 hr tablet; TAKE 2 TABLETS BY MOUTH IN THE MORNING AND 1 TABLET AT NIGHT.    Encounter for Medicare annual wellness exam    Other orders  -     INFLUENZA HIGH DOSE, TRIVALENT, PF (FLUZONE)  -     COVID-19 12+ (PFIZER)  -     Glucagon (GVOKE HYPOPEN) 1 MG/0.2ML pen; Inject the contents of 1 device under the skin into lower abdomen, outer thigh, or outer upper arm as needed for hypoglycemia. If no response after 15 minutes, additional 1 mg dose from a new device may be injected while waiting for emergency  assistance.        Rashi Ndiaye is a 84 year old, presenting for the following:  Diabetes (Route check up), Medicare Visit, and Wellness Visit        10/10/2024     9:41 AM   Additional Questions   Roomed by Machelle   Accompanied by patient and partner     Health Care Directive  Patient does not have a Health Care Directive or Living Will: Advance Directive received and scanned. Click on Code in the patient header to view.    HPI    - Managing DM with metformin; dose lowered due to kidney function  - Takes Mg and Fe supplements as prescribed  - Experiences low BS episodes: becomes incoherent; wife gives ice cream - she requests a glucagon shot in the case he is unconscious      - Due for: tetanus (last 2014), annual flu and COVID-19 vaccines  - BP well-managed; comfortable with current kidney function  - Annual eye exams; no vision issues reported  - Manages insulin with OTC purchases from [employer]    Note: Brother taken off metformin due to kidney issues          10/10/2024   General Health   How would you rate your overall physical health? Good   Feel stress (tense, anxious, or unable to sleep) Only a little      (!) STRESS CONCERN      10/10/2024   Nutrition   Diet: Regular (no restrictions)            10/10/2024   Exercise   Days per week of moderate/strenous exercise 3 days   Average minutes spent exercising at this level 60 min            10/10/2024   Social Factors   Frequency of gathering with friends or relatives More than three times a week   Worry food won't last until get money to buy more No   Food not last or not have enough money for food? No   Do you have housing? (Housing is defined as stable permanent housing and does not include staying ouside in a car, in a tent, in an abandoned building, in an overnight shelter, or couch-surfing.) No   Are you worried about losing your housing? No   Lack of transportation? No   Unable to get utilities (heat,electricity)? No   Want help with housing or  utility concern? No      (!) HOUSING CONCERN PRESENT      10/10/2024   Fall Risk   Fallen 2 or more times in the past year? No   Trouble with walking or balance? No   Gait Speed Test Interpretation Greater than 5.01 seconds - ABNORMAL             10/10/2024   Activities of Daily Living- Home Safety   Needs help with the following daily activites None of the above   Safety concerns in the home None of the above            10/10/2024   Dental   Dentist two times every year? Yes            10/10/2024   Hearing Screening   Hearing concerns? (!) I FEEL THAT PEOPLE ARE MUMBLING OR NOT SPEAKING CLEARLY.            10/10/2024   Driving Risk Screening   Patient/family members have concerns about driving No            10/10/2024   General Alertness/Fatigue Screening   Have you been more tired than usual lately? (!) YES            10/10/2024   Urinary Incontinence Screening   Bothered by leaking urine in past 6 months No            10/10/2024   TB Screening   Were you born outside of the [place]? No          Today's PHQ-2 Score:       5/16/2024     9:42 AM   PHQ-2 ( 1999 [employer])   Q1: Little interest or pleasure in doing things 0   Q2: Feeling down, depressed or hopeless 0   PHQ-2 Score 0         10/10/2024   Substance Use   Alcohol more than 3/day or more than 7/wk No   Do you have a current opioid prescription? No   How severe/bad is pain from 1 to 10? 0/10 (No Pain)   Do you use any other substances recreationally? No        Social History     Tobacco Use    Smoking status: Never    Smokeless tobacco: Never   Vaping Use    Vaping status: Never Used   Substance Use Topics    Alcohol use: Yes     Comment: beer once in a while    Drug use: No         Reviewed and updated as needed this visit by Provider   Tobacco  Allergies  Meds  Problems  Med Hx  Surg Hx  Fam Hx              Current providers sharing in care for this patient include:  Patient Care Team:  Mo Lovett MD as PCP - General (Family Practice)  Mo Lovett MD  "as Assigned PCP  Hay Conner RPH as Pharmacist (Pharmacist)    The following health maintenance items are reviewed in Epic and correct as of today:  Health Maintenance   Topic Date Due    ZOSTER IMMUNIZATION (1 of 2) Never done    RSV VACCINE (1 - 1-dose 75+ series) Never done    DIABETIC FOOT EXAM  07/01/2023    DTAP/TDAP/TD IMMUNIZATION (2 - Td or Tdap) 05/05/2024    EYE EXAM  07/11/2024    A1C  01/09/2025    LIPID  05/15/2025    MICROALBUMIN  05/15/2025    BMP  10/09/2025    HEMOGLOBIN  10/09/2025    MEDICARE ANNUAL WELLNESS VISIT  10/10/2025    FALL RISK ASSESSMENT  10/10/2025    ADVANCE CARE PLANNING  10/10/2029    PHQ-2 (once per calendar year)  Completed    INFLUENZA VACCINE  Completed    Pneumococcal Vaccine: 65+ Years  Completed    COVID-19 Vaccine  Completed    HPV IMMUNIZATION  Aged Out    MENINGITIS IMMUNIZATION  Aged Out    RSV MONOCLONAL ANTIBODY  Aged Out    URINALYSIS  Discontinued    COLORECTAL CANCER SCREENING  Discontinued            Objective    Exam  /71 (BP Location: Left arm, Patient Position: Sitting, Cuff Size: Adult Large)   Pulse 68   Temp 98.8  F (37.1  C) (Oral)   Resp 12   Ht 1.778 m (5' 10\")   Wt 97.4 kg (214 lb 12.8 oz)   SpO2 97%   BMI 30.82 kg/m     Estimated body mass index is 30.82 kg/m  as calculated from the following:    Height as of this encounter: 1.778 m (5' 10\").    Weight as of this encounter: 97.4 kg (214 lb 12.8 oz).    Physical Exam          10/10/2024   Mini Cog   Clock Draw Score 2 Normal   3 Item Recall 3 objects recalled   Mini Cog Total Score 5               Assessment and Plan:    1. DM2  - A1c stable, could improve  - Continue Metformin at reduced dose (kidney function)  - Consider Jardiance if insurance allows (did not previously)  - Continue self-monitoring BG and insulin administration    2. CKD  - Stable function, no significant change in 2 years  - Annual urine protein check  - Recommend nephrology referral if GFR < 30 - patient prefers not " for now    3. HTN  - Well-controlled    4. Anemia  - Continue oral iron q2 days  - Monitor Hgb levels    5. Hypomagnesemia  - OTC Mg 250 mg - gave Rx today  - Monitor Mg levels    6. Essential Tremor  - Patient declines neurology referral, prefers no medication    7. Immunizations  - Recommend flu and COVID-19 vaccines  - Tetanus vaccine due (last 2014)  - Recommend shingles vaccine at pharmacy    8. Hypoglycemia  - Rx Glucagon auto-injector for emergency use  - Educate patient/caregiver on use    9. Medication Refills  - Refill all current meds as needed  - Continue OTC insulin purchase    10. Preventive Care  - Annual eye exam with eye MD  - Monitor BP, kidney function, BG levels    11. Insurance  - Explore Part D coverage during November open enrollment for potential med cost reduction    Signed Electronically by: Mo Lovett MD

## 2025-02-01 ENCOUNTER — HEALTH MAINTENANCE LETTER (OUTPATIENT)
Age: 85
End: 2025-02-01

## 2025-03-26 ENCOUNTER — LAB (OUTPATIENT)
Dept: LAB | Facility: CLINIC | Age: 85
End: 2025-03-26
Payer: COMMERCIAL

## 2025-03-26 DIAGNOSIS — N18.32 STAGE 3B CHRONIC KIDNEY DISEASE (H): ICD-10-CM

## 2025-03-26 DIAGNOSIS — N18.32 TYPE 2 DIABETES MELLITUS WITH STAGE 3B CHRONIC KIDNEY DISEASE, WITH LONG-TERM CURRENT USE OF INSULIN (H): ICD-10-CM

## 2025-03-26 DIAGNOSIS — E08.29 DIABETES MELLITUS DUE TO UNDERLYING CONDITION WITH OTHER DIABETIC KIDNEY COMPLICATION (H): ICD-10-CM

## 2025-03-26 DIAGNOSIS — Z79.4 TYPE 2 DIABETES MELLITUS WITH STAGE 3B CHRONIC KIDNEY DISEASE, WITH LONG-TERM CURRENT USE OF INSULIN (H): ICD-10-CM

## 2025-03-26 DIAGNOSIS — E11.22 TYPE 2 DIABETES MELLITUS WITH STAGE 3B CHRONIC KIDNEY DISEASE, WITH LONG-TERM CURRENT USE OF INSULIN (H): ICD-10-CM

## 2025-03-26 LAB
ANION GAP SERPL CALCULATED.3IONS-SCNC: 12 MMOL/L (ref 7–15)
BUN SERPL-MCNC: 40.4 MG/DL (ref 8–23)
CALCIUM SERPL-MCNC: 9.2 MG/DL (ref 8.8–10.4)
CHLORIDE SERPL-SCNC: 102 MMOL/L (ref 98–107)
CHOLEST SERPL-MCNC: 139 MG/DL
CREAT SERPL-MCNC: 2.15 MG/DL (ref 0.67–1.17)
EGFRCR SERPLBLD CKD-EPI 2021: 30 ML/MIN/1.73M2
ERYTHROCYTE [DISTWIDTH] IN BLOOD BY AUTOMATED COUNT: 13.2 % (ref 10–15)
EST. AVERAGE GLUCOSE BLD GHB EST-MCNC: 180 MG/DL
FASTING STATUS PATIENT QL REPORTED: ABNORMAL
FASTING STATUS PATIENT QL REPORTED: NORMAL
GLUCOSE SERPL-MCNC: 250 MG/DL (ref 70–99)
HBA1C MFR BLD: 7.9 % (ref 0–5.6)
HCO3 SERPL-SCNC: 23 MMOL/L (ref 22–29)
HCT VFR BLD AUTO: 31.4 % (ref 40–53)
HDLC SERPL-MCNC: 45 MG/DL
HGB BLD-MCNC: 9.9 G/DL (ref 13.3–17.7)
LDLC SERPL CALC-MCNC: 68 MG/DL
MCH RBC QN AUTO: 29.6 PG (ref 26.5–33)
MCHC RBC AUTO-ENTMCNC: 31.5 G/DL (ref 31.5–36.5)
MCV RBC AUTO: 94 FL (ref 78–100)
NONHDLC SERPL-MCNC: 94 MG/DL
PLATELET # BLD AUTO: 302 10E3/UL (ref 150–450)
POTASSIUM SERPL-SCNC: 5.3 MMOL/L (ref 3.4–5.3)
RBC # BLD AUTO: 3.34 10E6/UL (ref 4.4–5.9)
SODIUM SERPL-SCNC: 137 MMOL/L (ref 135–145)
TRIGL SERPL-MCNC: 130 MG/DL
WBC # BLD AUTO: 7.1 10E3/UL (ref 4–11)

## 2025-03-31 ENCOUNTER — OFFICE VISIT (OUTPATIENT)
Dept: FAMILY MEDICINE | Facility: CLINIC | Age: 85
End: 2025-03-31
Payer: COMMERCIAL

## 2025-03-31 VITALS
HEIGHT: 71 IN | WEIGHT: 213.4 LBS | SYSTOLIC BLOOD PRESSURE: 146 MMHG | DIASTOLIC BLOOD PRESSURE: 76 MMHG | BODY MASS INDEX: 29.88 KG/M2 | OXYGEN SATURATION: 97 % | TEMPERATURE: 97.8 F | RESPIRATION RATE: 16 BRPM | HEART RATE: 74 BPM

## 2025-03-31 DIAGNOSIS — R80.1 PERSISTENT PROTEINURIA: ICD-10-CM

## 2025-03-31 DIAGNOSIS — I10 ESSENTIAL HYPERTENSION: ICD-10-CM

## 2025-03-31 DIAGNOSIS — E83.42 HYPOMAGNESEMIA: ICD-10-CM

## 2025-03-31 DIAGNOSIS — I12.9 RENAL HYPERTENSION: ICD-10-CM

## 2025-03-31 DIAGNOSIS — N18.32 STAGE 3B CHRONIC KIDNEY DISEASE (H): ICD-10-CM

## 2025-03-31 DIAGNOSIS — N18.32 TYPE 2 DIABETES MELLITUS WITH STAGE 3B CHRONIC KIDNEY DISEASE, WITH LONG-TERM CURRENT USE OF INSULIN (H): Primary | ICD-10-CM

## 2025-03-31 DIAGNOSIS — Z79.4 TYPE 2 DIABETES MELLITUS WITH STAGE 3B CHRONIC KIDNEY DISEASE, WITH LONG-TERM CURRENT USE OF INSULIN (H): Primary | ICD-10-CM

## 2025-03-31 DIAGNOSIS — N18.4 CKD (CHRONIC KIDNEY DISEASE) STAGE 4, GFR 15-29 ML/MIN (H): ICD-10-CM

## 2025-03-31 DIAGNOSIS — E11.22 TYPE 2 DIABETES MELLITUS WITH STAGE 3B CHRONIC KIDNEY DISEASE, WITH LONG-TERM CURRENT USE OF INSULIN (H): Primary | ICD-10-CM

## 2025-03-31 PROCEDURE — 99214 OFFICE O/P EST MOD 30 MIN: CPT | Performed by: FAMILY MEDICINE

## 2025-03-31 PROCEDURE — 3077F SYST BP >= 140 MM HG: CPT | Performed by: FAMILY MEDICINE

## 2025-03-31 PROCEDURE — G2211 COMPLEX E/M VISIT ADD ON: HCPCS | Performed by: FAMILY MEDICINE

## 2025-03-31 PROCEDURE — 3078F DIAST BP <80 MM HG: CPT | Performed by: FAMILY MEDICINE

## 2025-03-31 RX ORDER — BEXAGLIFLOZIN 20 MG
20 TABLET ORAL DAILY
Qty: 90 TABLET | Refills: 1 | Status: SHIPPED | OUTPATIENT
Start: 2025-03-31 | End: 2025-04-02

## 2025-03-31 RX ORDER — METOPROLOL SUCCINATE 100 MG/1
TABLET, EXTENDED RELEASE ORAL
Qty: 270 TABLET | Refills: 1 | Status: SHIPPED | OUTPATIENT
Start: 2025-03-31

## 2025-03-31 RX ORDER — LISINOPRIL 40 MG/1
40 TABLET ORAL DAILY
Qty: 90 TABLET | Refills: 1 | Status: SHIPPED | OUTPATIENT
Start: 2025-03-31

## 2025-03-31 RX ORDER — ASPIRIN 325 MG
325 TABLET ORAL EVERY OTHER DAY
Qty: 90 TABLET | Refills: 1 | Status: SHIPPED | OUTPATIENT
Start: 2025-03-31

## 2025-03-31 RX ORDER — ATORVASTATIN CALCIUM 40 MG/1
40 TABLET, FILM COATED ORAL DAILY
Qty: 90 TABLET | Refills: 1 | Status: SHIPPED | OUTPATIENT
Start: 2025-03-31

## 2025-03-31 RX ORDER — MULTIVITAMIN WITH IRON
1 TABLET ORAL AT BEDTIME
Qty: 90 TABLET | Refills: 1 | Status: SHIPPED | OUTPATIENT
Start: 2025-03-31

## 2025-03-31 RX ORDER — FERROUS SULFATE 325(65) MG
325 TABLET ORAL
Qty: 36 TABLET | Refills: 3 | Status: SHIPPED | OUTPATIENT
Start: 2025-04-01

## 2025-03-31 RX ORDER — AMLODIPINE BESYLATE 10 MG/1
10 TABLET ORAL DAILY
Qty: 90 TABLET | Refills: 1 | Status: SHIPPED | OUTPATIENT
Start: 2025-03-31

## 2025-03-31 NOTE — PATIENT INSTRUCTIONS
Referral to nephrology: Dr David Chairez, [facility], [phone]    Brenzavvy (like Tamela) sent to [facility] mail order [facility]    Here are your latest lab values taken on 3/26/25   Hemoglobin  13.3 - 17.7 g/dL 9.9 Low       Cholesterol  <200 mg/dL 139          Triglycerides  <150 mg/dL 130          Direct Measure HDL  >=40 mg/dL 45          LDL Cholesterol Calculated  <100 mg/dL 68          Non HDL Cholesterol  <130 mg/dL 94              Sodium  135 - 145 mmol/L 137          Potassium  3.4 - 5.3 mmol/L 5.3          Chloride  98 - 107 mmol/L 102          Carbon Dioxide (CO2)  22 - 29 mmol/L 23          Anion Gap  7 - 15 mmol/L 12          Urea Nitrogen  8.0 - 23.0 mg/dL 40.4 High           Creatinine  0.67 - 1.17 mg/dL 2.15 High           GFR Estimate  >60 mL/min/1.73m2 30 Low          Comment: eGFR calculated using 2021 CKD-EPI equation.    Calcium  8.8 - 10.4 mg/dL 9.2          Glucose  70 - 99 mg/dL 250 High            Estimated Average Glucose  <117 mg/dL 180 High          Hemoglobin A1C  0.0 - 5.6 % 7.9 High

## 2025-03-31 NOTE — PROGRESS NOTES
Assessment & Plan   Senthil was seen today for diabetes, recheck medication and fatigue.    Diagnoses and all orders for this visit:    Type 2 diabetes mellitus with stage 3b chronic kidney disease, with long-term current use of insulin (H)  -     Discontinue: Bexagliflozin 20 MG TABS; Take 20 mg by mouth daily.  -     aspirin (ASA) 325 MG tablet; Take 1 tablet (325 mg) by mouth every other day.  -     atorvastatin (LIPITOR) 40 MG tablet; Take 1 tablet (40 mg) by mouth daily.  -     blood glucose (ONETOUCH ULTRA) test strip; Use up to 3 times daily  -     Glucagon (GVOKE HYPOPEN) 1 MG/0.2ML pen; Inject the contents of 1 device under the skin into lower abdomen, outer thigh, or outer upper arm as needed for hypoglycemia. If no response after 15 minutes, additional 1 mg dose from a new device may be injected while waiting for emergency assistance.  -     lisinopril (ZESTRIL) 40 MG tablet; Take 1 tablet (40 mg) by mouth daily.  -     empagliflozin (JARDIANCE) 10 MG TABS tablet; Take 1 tablet (10 mg) by mouth daily.    Hypomagnesemia  -     Magnesium; Future  -     magnesium 250 MG tablet; Take 1 tablet (250 mg) by mouth at bedtime.    Essential hypertension  -     amLODIPine (NORVASC) 10 MG tablet; Take 1 tablet (10 mg) by mouth daily.  -     lisinopril (ZESTRIL) 40 MG tablet; Take 1 tablet (40 mg) by mouth daily.    Stage 3b chronic kidney disease (H)  -     ferrous sulfate (FEROSUL) 325 (65 Fe) MG tablet; Take 1 tablet (325 mg) by mouth three times a week.    Persistent proteinuria  -     lisinopril (ZESTRIL) 40 MG tablet; Take 1 tablet (40 mg) by mouth daily.    Renal hypertension  -     metoprolol succinate ER (TOPROL XL) 100 MG 24 hr tablet; TAKE 2 TABLETS BY MOUTH IN THE MORNING AND 1 TABLET AT NIGHT.    CKD (chronic kidney disease) stage 4, GFR 15-29 ml/min (H)  -     Adult Nephrology  Referral; Future        Type 2 diabetes mellitus with stage 3b chronic kidney disease, with long-term current use of  insulin (H)  Last A1C is 7.9%. Reports good compliance with medications. Wife notes hypoglycemic episodes every few months where he will become very dizzy, shaky, and tired. He desires to continue on his insulin NPH-regular regimen after offered alternative medications. Recent eGFR decreased to 30. He will stop metformin and will start SGLT2-I.  - Empagliflozin 10mgs START  - aspirin (ASA) 325 MG tablet; Take 1 tablet (325 mg) by mouth every other day.  - atorvastatin (LIPITOR) 40 MG tablet; Take 1 tablet (40 mg) by mouth daily.  - blood glucose (ONETOUCH ULTRA) test strip; Use up to 3 times daily  - Glucagon (GVOKE HYPOPEN) 1 MG/0.2ML pen; Inject the contents of 1 device under the skin into lower abdomen, outer thigh, or outer upper arm as needed for hypoglycemia. If no response after 15 minutes, additional 1 mg dose from a new device may be injected while waiting for emergency assistance.  - lisinopril (ZESTRIL) 40 MG tablet; Take 1 tablet (40 mg) by mouth daily.    Hypomagnesemia  Last magnesium 1.4 mg/dL on 10/9/24. Continue to take magnesium supplementation.  - Magnesium; Future  - magnesium 250 MG tablet; Take 1 tablet (250 mg) by mouth at bedtime.    Essential hypertension  Reports home blood pressures of 130s/80s with intermittent low blood pressures of 90/70. He has associated dizziness and fatigue with the hypotensive episodes. He reduced his metoprolol dose to 100 mg BID because of the low blood pressures. He reports that the dose reduction decreased episodes of hypotension. Will continue to take 100 mg BID and add in third tablet if noticing blood pressures are running high.   - amLODIPine (NORVASC) 10 MG tablet; Take 1 tablet (10 mg) by mouth daily.  - lisinopril (ZESTRIL) 40 MG tablet; Take 1 tablet (40 mg) by mouth daily.    Stage 3b chronic kidney disease (H)  eGFR decreased to 30 consistent with Stage 3b CKD. Under controlled diabetes and hypertension contributing to decreasing renal function.  "Recent hemoglobin of 9.9. Reports not taking iron supplementation. Encouraged the continued use of iron supplementation. Will refer patient to nephrology for further evaluation.   - ferrous sulfate (FEROSUL) 325 (65 Fe) MG tablet; Take 1 tablet (325 mg) by mouth three times a week.    Persistent proteinuria  - lisinopril (ZESTRIL) 40 MG tablet; Take 1 tablet (40 mg) by mouth daily.    Renal hypertension  Reports home blood pressures of 130s/80s with intermittent low blood pressures of 90/70. He has associated dizziness and fatigue with the hypotensive episodes. He reduced his metoprolol dose to 100 mg BID because of the low blood pressures. He reports that the dose reduction decreased episodes of hypotension. Will continue to take 100 mg BID and add in third tablet if noticing blood pressures are running high.   - metoprolol succinate ER (TOPROL XL) 100 MG 24 hr tablet; TAKE 2 TABLETS BY MOUTH IN THE MORNING AND 1 TABLET AT NIGHT.      BMI  Estimated body mass index is 29.76 kg/m  as calculated from the following:    Height as of this encounter: 1.803 m (5' 11\").    Weight as of this encounter: 96.8 kg (213 lb 6.4 oz).       Return in about 6 months (around 9/30/2025), or if symptoms worsen or fail to improve.    Rashi Ndiaye is a 84 year old, presenting for the following health issues:  Diabetes, Recheck Medication, and Fatigue (Tiredness )        3/31/2025     9:46 AM   Additional Questions   Roomed by Machelle   Accompanied by patient and partner         3/31/2025    Information    services provided? No     [facility]      Kip presents to clinic today for diabetes management, hypertension management, and increasing fatigue.     For diabetes, he reports taking insulin and metformin as prescribed. Every couple of months, he has hypoglycemic episodes. He will become very dizzy, shaky, and tired. His wife will have to put ice cream in his mouth in order to get his blood sugar back up. He was " "prescribed the glucagon pen at his last visit in case another episode occurred, but he was unable to pick it up at the pharmacy. His wife would really like to be able to get the medication.     For hypertension, he cut down his metoprolol dose to 100 mg tablet twice a day. Prescription was written for two tablets in the morning and one tablet at night. He cut his morning dose in half because he was getting low blood pressure readings. Some readings were 90/70s. He was feeling associated dizziness and fatigue during those times.     He also notes that he has had increasing fatigue over the past couple of months. Discussed that his decreasing hemoglobin levels could be contributing to his fatigue. He has not been taking his iron supplement regularly. He also thinks that the weather could be contributing to his fatigue. He is less active in the winter months. No hematochezia or hematuria.         Review of Systems  HEENT, pulmonary, gi and gu systems are negative, except as otherwise noted.      Objective    BP (!) 146/76 (BP Location: Left arm, Patient Position: Sitting, Cuff Size: Adult Large)   Pulse 74   Temp 97.8  F (36.6  C) (Oral)   Resp 16   Ht 1.803 m (5' 11\")   Wt 96.8 kg (213 lb 6.4 oz)   SpO2 97%   BMI 29.76 kg/m    Body mass index is 29.76 kg/m .  Physical Exam   GENERAL: alert and no distress  RESP: lungs clear to auscultation - no rales, rhonchi or wheezes  CV: regular rate and rhythm, normal S1 S2, no S3 or S4, no murmur, click or rub, no peripheral edema  MS: no gross musculoskeletal defects noted, no edema  PSYCH: mentation appears normal, affect normal/bright    Lab on 03/26/2025   Component Date Value Ref Range Status    Estimated Average Glucose 03/26/2025 180 (H)  <117 mg/dL Final    Hemoglobin A1C 03/26/2025 7.9 (H)  0.0 - 5.6 % Final    Normal <5.7%   Prediabetes 5.7-6.4%    Diabetes 6.5% or higher     Note: Adopted from ADA consensus guidelines.    Sodium 03/26/2025 137  135 - 145 mmol/L " Final    Potassium 03/26/2025 5.3  3.4 - 5.3 mmol/L Final    Chloride 03/26/2025 102  98 - 107 mmol/L Final    Carbon Dioxide (CO2) 03/26/2025 23  22 - 29 mmol/L Final    Anion Gap 03/26/2025 12  7 - 15 mmol/L Final    Urea Nitrogen 03/26/2025 40.4 (H)  8.0 - 23.0 mg/dL Final    Creatinine 03/26/2025 2.15 (H)  0.67 - 1.17 mg/dL Final    GFR Estimate 03/26/2025 30 (L)  >60 mL/min/1.73m2 Final    eGFR calculated using 2021 CKD-EPI equation.    Calcium 03/26/2025 9.2  8.8 - 10.4 mg/dL Final    Glucose 03/26/2025 250 (H)  70 - 99 mg/dL Final    Patient Fasting > 8hrs? 03/26/2025 Unknown   Final    Cholesterol 03/26/2025 139  <200 mg/dL Final    Triglycerides 03/26/2025 130  <150 mg/dL Final    Direct Measure HDL 03/26/2025 45  >=40 mg/dL Final    LDL Cholesterol Calculated 03/26/2025 68  <100 mg/dL Final    Non HDL Cholesterol 03/26/2025 94  <130 mg/dL Final    Patient Fasting > 8hrs? 03/26/2025 Unknown   Final    WBC Count 03/26/2025 7.1  4.0 - 11.0 10e3/uL Final    RBC Count 03/26/2025 3.34 (L)  4.40 - 5.90 10e6/uL Final    Hemoglobin 03/26/2025 9.9 (L)  13.3 - 17.7 g/dL Final    Hematocrit 03/26/2025 31.4 (L)  40.0 - 53.0 % Final    MCV 03/26/2025 94  78 - 100 fL Final    MCH 03/26/2025 29.6  26.5 - 33.0 pg Final    MCHC 03/26/2025 31.5  31.5 - 36.5 g/dL Final    RDW 03/26/2025 13.2  10.0 - 15.0 % Final    Platelet Count 03/26/2025 302  150 - 450 10e3/uL Final           Sheree Francis MS3    Preceptor Attestation:   I was present with the medical student who participated in the service and in the documentation of this note. I have verified the history and personally performed the physical exam and medical decision making. I have verified the content of the note, which accurately reflects my assessment of the patient and the plan of care.   Supervising Physician:  Mo Lovett MD.    Total visit time with patient was 25 mins, all of which was face to face MD time, and over 50% of this time was spent in counseling and  coordination of care.  Including post-encounter documentation and orders on the date of service, total encounter time was 32 mins.    The longitudinal plan of care for the diagnosis(es)/condition(s) as documented were addressed during this visit. Due to the added complexity in care, I will continue to support Senthil in the subsequent management and with ongoing continuity of care.      Signed Electronically by: Mo Lovett MD

## 2025-07-06 ENCOUNTER — HEALTH MAINTENANCE LETTER (OUTPATIENT)
Age: 85
End: 2025-07-06